# Patient Record
Sex: FEMALE | Race: WHITE | Employment: OTHER | URBAN - METROPOLITAN AREA
[De-identification: names, ages, dates, MRNs, and addresses within clinical notes are randomized per-mention and may not be internally consistent; named-entity substitution may affect disease eponyms.]

---

## 2017-02-17 RX ORDER — TRAZODONE HYDROCHLORIDE 50 MG/1
TABLET ORAL
Qty: 270 TABLET | Refills: 0 | Status: SHIPPED | OUTPATIENT
Start: 2017-02-17 | End: 2017-06-01 | Stop reason: SDUPTHER

## 2017-02-17 RX ORDER — GABAPENTIN 300 MG/1
CAPSULE ORAL
Qty: 270 CAPSULE | Refills: 0 | Status: SHIPPED | OUTPATIENT
Start: 2017-02-17 | End: 2017-06-01 | Stop reason: SDUPTHER

## 2017-02-17 RX ORDER — DULOXETIN HYDROCHLORIDE 60 MG/1
CAPSULE, DELAYED RELEASE ORAL
Qty: 90 CAPSULE | Refills: 0 | Status: SHIPPED | OUTPATIENT
Start: 2017-02-17 | End: 2017-03-21

## 2017-03-02 DIAGNOSIS — E03.9 ACQUIRED HYPOTHYROIDISM: ICD-10-CM

## 2017-03-02 LAB
ANION GAP SERPL CALCULATED.3IONS-SCNC: 13 MEQ/L (ref 7–13)
BUN BLDV-MCNC: 7 MG/DL (ref 6–20)
CALCIUM SERPL-MCNC: 9.5 MG/DL (ref 8.6–10.2)
CHLORIDE BLD-SCNC: 99 MEQ/L (ref 98–107)
CO2: 28 MEQ/L (ref 22–29)
CREAT SERPL-MCNC: 0.67 MG/DL (ref 0.5–0.9)
CREATININE URINE: 178.1 MG/DL
GFR AFRICAN AMERICAN: >60
GFR NON-AFRICAN AMERICAN: >60
GLUCOSE BLD-MCNC: 105 MG/DL (ref 74–109)
HBA1C MFR BLD: 7.4 % (ref 4.8–5.9)
MICROALBUMIN UR-MCNC: 4.3 MG/DL
MICROALBUMIN/CREAT UR-RTO: 24.1 MG/G (ref 0–30)
POTASSIUM SERPL-SCNC: 4.8 MEQ/L (ref 3.5–5.1)
SODIUM BLD-SCNC: 140 MEQ/L (ref 132–144)
T4 FREE: 1.4 NG/DL (ref 0.93–1.7)
TSH SERPL DL<=0.05 MIU/L-ACNC: 5.82 UIU/ML (ref 0.27–4.2)

## 2017-03-04 ENCOUNTER — APPOINTMENT (OUTPATIENT)
Dept: GENERAL RADIOLOGY | Age: 47
End: 2017-03-04
Payer: COMMERCIAL

## 2017-03-04 ENCOUNTER — HOSPITAL ENCOUNTER (EMERGENCY)
Age: 47
Discharge: HOME | End: 2017-03-04
Attending: EMERGENCY MEDICINE
Payer: COMMERCIAL

## 2017-03-04 VITALS
TEMPERATURE: 97.4 F | BODY MASS INDEX: 50.12 KG/M2 | DIASTOLIC BLOOD PRESSURE: 68 MMHG | RESPIRATION RATE: 22 BRPM | HEART RATE: 100 BPM | OXYGEN SATURATION: 98 % | WEIGHT: 293 LBS | SYSTOLIC BLOOD PRESSURE: 120 MMHG

## 2017-03-04 DIAGNOSIS — F41.1 ANXIETY STATE: Primary | ICD-10-CM

## 2017-03-04 LAB
ALBUMIN SERPL-MCNC: 4.1 G/DL (ref 3.9–4.9)
ALP BLD-CCNC: 182 U/L (ref 40–130)
ALT SERPL-CCNC: 56 U/L (ref 0–33)
ANION GAP SERPL CALCULATED.3IONS-SCNC: 19 MEQ/L (ref 7–13)
AST SERPL-CCNC: 58 U/L (ref 0–35)
BILIRUB SERPL-MCNC: 0.3 MG/DL (ref 0–1.2)
BUN BLDV-MCNC: 10 MG/DL (ref 6–20)
CALCIUM SERPL-MCNC: 9.2 MG/DL (ref 8.6–10.2)
CHLORIDE BLD-SCNC: 97 MEQ/L (ref 98–107)
CO2: 22 MEQ/L (ref 22–29)
CREAT SERPL-MCNC: 0.96 MG/DL (ref 0.5–0.9)
GFR AFRICAN AMERICAN: >60
GFR NON-AFRICAN AMERICAN: >60
GLOBULIN: 3.5 G/DL (ref 2.3–3.5)
GLUCOSE BLD-MCNC: 163 MG/DL (ref 74–109)
HCT VFR BLD CALC: 47.9 % (ref 37–47)
HEMOGLOBIN: 16.2 G/DL (ref 12–16)
MCH RBC QN AUTO: 29.1 PG (ref 27–31.3)
MCHC RBC AUTO-ENTMCNC: 33.8 % (ref 33–37)
MCV RBC AUTO: 86.1 FL (ref 82–100)
PDW BLD-RTO: 14.2 % (ref 11.5–14.5)
PLATELET # BLD: 256 K/UL (ref 130–400)
POTASSIUM SERPL-SCNC: 4.6 MEQ/L (ref 3.5–5.1)
RBC # BLD: 5.56 M/UL (ref 4.2–5.4)
SODIUM BLD-SCNC: 138 MEQ/L (ref 132–144)
TOTAL PROTEIN: 7.6 G/DL (ref 6.4–8.1)
WBC # BLD: 4.4 K/UL (ref 4.8–10.8)

## 2017-03-04 PROCEDURE — 36415 COLL VENOUS BLD VENIPUNCTURE: CPT

## 2017-03-04 PROCEDURE — 99284 EMERGENCY DEPT VISIT MOD MDM: CPT

## 2017-03-04 PROCEDURE — 80053 COMPREHEN METABOLIC PANEL: CPT

## 2017-03-04 PROCEDURE — 6360000002 HC RX W HCPCS: Performed by: EMERGENCY MEDICINE

## 2017-03-04 PROCEDURE — 71020 XR CHEST STANDARD TWO VW: CPT

## 2017-03-04 PROCEDURE — 93005 ELECTROCARDIOGRAM TRACING: CPT

## 2017-03-04 PROCEDURE — 96374 THER/PROPH/DIAG INJ IV PUSH: CPT

## 2017-03-04 PROCEDURE — 85027 COMPLETE CBC AUTOMATED: CPT

## 2017-03-04 RX ORDER — LORAZEPAM 2 MG/ML
1 INJECTION INTRAMUSCULAR ONCE
Status: COMPLETED | OUTPATIENT
Start: 2017-03-04 | End: 2017-03-04

## 2017-03-04 RX ADMIN — LORAZEPAM 1 MG: 2 INJECTION INTRAMUSCULAR; INTRAVENOUS at 17:45

## 2017-03-04 ASSESSMENT — ENCOUNTER SYMPTOMS
VOMITING: 0
ABDOMINAL PAIN: 0
WHEEZING: 0
ABDOMINAL DISTENTION: 0
EYE DISCHARGE: 0
COLOR CHANGE: 0
RHINORRHEA: 0
SHORTNESS OF BREATH: 1
FACIAL SWELLING: 0
CHEST TIGHTNESS: 1
PHOTOPHOBIA: 0

## 2017-03-04 ASSESSMENT — PAIN SCALES - GENERAL: PAINLEVEL_OUTOF10: 0

## 2017-03-06 ENCOUNTER — OFFICE VISIT (OUTPATIENT)
Dept: SURGERY | Age: 47
End: 2017-03-06

## 2017-03-06 ENCOUNTER — OFFICE VISIT (OUTPATIENT)
Dept: FAMILY MEDICINE CLINIC | Age: 47
End: 2017-03-06

## 2017-03-06 VITALS
TEMPERATURE: 98.2 F | HEIGHT: 67 IN | RESPIRATION RATE: 20 BRPM | DIASTOLIC BLOOD PRESSURE: 82 MMHG | BODY MASS INDEX: 45.99 KG/M2 | SYSTOLIC BLOOD PRESSURE: 132 MMHG | OXYGEN SATURATION: 98 % | WEIGHT: 293 LBS | HEART RATE: 82 BPM

## 2017-03-06 VITALS — DIASTOLIC BLOOD PRESSURE: 68 MMHG | SYSTOLIC BLOOD PRESSURE: 104 MMHG | HEIGHT: 67 IN | HEART RATE: 108 BPM

## 2017-03-06 DIAGNOSIS — E03.9 ACQUIRED HYPOTHYROIDISM: ICD-10-CM

## 2017-03-06 DIAGNOSIS — F41.9 ANXIETY: ICD-10-CM

## 2017-03-06 DIAGNOSIS — F41.0 PANIC ATTACKS: Primary | ICD-10-CM

## 2017-03-06 LAB — GLUCOSE BLD-MCNC: 180 MG/DL

## 2017-03-06 PROCEDURE — 99213 OFFICE O/P EST LOW 20 MIN: CPT | Performed by: FAMILY MEDICINE

## 2017-03-06 PROCEDURE — 99213 OFFICE O/P EST LOW 20 MIN: CPT | Performed by: INTERNAL MEDICINE

## 2017-03-06 PROCEDURE — 82962 GLUCOSE BLOOD TEST: CPT | Performed by: INTERNAL MEDICINE

## 2017-03-06 RX ORDER — ESCITALOPRAM OXALATE 20 MG/1
20 TABLET ORAL DAILY
Qty: 30 TABLET | Refills: 2 | Status: SHIPPED | OUTPATIENT
Start: 2017-03-06 | End: 2017-05-23 | Stop reason: SDUPTHER

## 2017-03-06 RX ORDER — LORAZEPAM 0.5 MG/1
0.5 TABLET ORAL EVERY 8 HOURS PRN
Qty: 60 TABLET | Refills: 1 | Status: SHIPPED | OUTPATIENT
Start: 2017-03-06 | End: 2017-03-21

## 2017-03-08 ENCOUNTER — TELEPHONE (OUTPATIENT)
Dept: FAMILY MEDICINE CLINIC | Age: 47
End: 2017-03-08

## 2017-03-09 ENCOUNTER — OFFICE VISIT (OUTPATIENT)
Dept: FAMILY MEDICINE CLINIC | Age: 47
End: 2017-03-09

## 2017-03-09 VITALS
TEMPERATURE: 98.1 F | WEIGHT: 293 LBS | DIASTOLIC BLOOD PRESSURE: 88 MMHG | RESPIRATION RATE: 18 BRPM | BODY MASS INDEX: 45.99 KG/M2 | HEART RATE: 78 BPM | OXYGEN SATURATION: 96 % | HEIGHT: 67 IN | SYSTOLIC BLOOD PRESSURE: 138 MMHG

## 2017-03-09 DIAGNOSIS — F41.0 PANIC ATTACKS: Primary | ICD-10-CM

## 2017-03-09 DIAGNOSIS — F41.9 ANXIETY: ICD-10-CM

## 2017-03-09 LAB
EKG ATRIAL RATE: 107 BPM
EKG P AXIS: 48 DEGREES
EKG P-R INTERVAL: 128 MS
EKG Q-T INTERVAL: 318 MS
EKG QRS DURATION: 78 MS
EKG QTC CALCULATION (BAZETT): 424 MS
EKG R AXIS: 146 DEGREES
EKG T AXIS: -44 DEGREES
EKG VENTRICULAR RATE: 107 BPM

## 2017-03-09 PROCEDURE — 99213 OFFICE O/P EST LOW 20 MIN: CPT | Performed by: FAMILY MEDICINE

## 2017-03-09 RX ORDER — ALPRAZOLAM 0.5 MG/1
0.5 TABLET ORAL 3 TIMES DAILY PRN
Qty: 90 TABLET | Refills: 1 | Status: SHIPPED | OUTPATIENT
Start: 2017-03-09 | End: 2017-11-21 | Stop reason: SDUPTHER

## 2017-03-09 RX ORDER — PANTOPRAZOLE SODIUM 40 MG/1
40 TABLET, DELAYED RELEASE ORAL DAILY
Qty: 30 TABLET | Refills: 3 | Status: SHIPPED | OUTPATIENT
Start: 2017-03-09 | End: 2017-05-23 | Stop reason: SDUPTHER

## 2017-03-09 RX ORDER — QUETIAPINE FUMARATE 50 MG/1
50 TABLET, EXTENDED RELEASE ORAL NIGHTLY
Qty: 30 TABLET | Refills: 1 | Status: SHIPPED | OUTPATIENT
Start: 2017-03-09 | End: 2017-05-23 | Stop reason: CLARIF

## 2017-03-12 ASSESSMENT — ENCOUNTER SYMPTOMS
RESPIRATORY NEGATIVE: 1
GASTROINTESTINAL NEGATIVE: 1
EYES NEGATIVE: 1
CHEST TIGHTNESS: 0
RHINORRHEA: 0
GASTROINTESTINAL NEGATIVE: 1
COUGH: 0
CHEST TIGHTNESS: 0
COUGH: 0
RESPIRATORY NEGATIVE: 1
EYES NEGATIVE: 1
RHINORRHEA: 0

## 2017-03-14 ENCOUNTER — OFFICE VISIT (OUTPATIENT)
Dept: FAMILY MEDICINE CLINIC | Age: 47
End: 2017-03-14

## 2017-03-14 VITALS
HEART RATE: 82 BPM | RESPIRATION RATE: 18 BRPM | SYSTOLIC BLOOD PRESSURE: 134 MMHG | TEMPERATURE: 98.1 F | OXYGEN SATURATION: 98 % | WEIGHT: 293 LBS | BODY MASS INDEX: 44.41 KG/M2 | DIASTOLIC BLOOD PRESSURE: 78 MMHG | HEIGHT: 68 IN

## 2017-03-14 DIAGNOSIS — F32.A DEPRESSION, UNSPECIFIED DEPRESSION TYPE: ICD-10-CM

## 2017-03-14 DIAGNOSIS — F41.0 PANIC ATTACKS: Primary | ICD-10-CM

## 2017-03-14 PROCEDURE — 99213 OFFICE O/P EST LOW 20 MIN: CPT | Performed by: FAMILY MEDICINE

## 2017-03-14 ASSESSMENT — ENCOUNTER SYMPTOMS
GASTROINTESTINAL NEGATIVE: 1
CHEST TIGHTNESS: 0
RHINORRHEA: 0
EYES NEGATIVE: 1
RESPIRATORY NEGATIVE: 1
COUGH: 0

## 2017-03-21 ENCOUNTER — OFFICE VISIT (OUTPATIENT)
Dept: FAMILY MEDICINE CLINIC | Age: 47
End: 2017-03-21

## 2017-03-21 VITALS
WEIGHT: 293 LBS | DIASTOLIC BLOOD PRESSURE: 82 MMHG | OXYGEN SATURATION: 98 % | SYSTOLIC BLOOD PRESSURE: 130 MMHG | RESPIRATION RATE: 20 BRPM | TEMPERATURE: 98.6 F | HEIGHT: 68 IN | HEART RATE: 100 BPM | BODY MASS INDEX: 44.41 KG/M2

## 2017-03-21 DIAGNOSIS — F41.9 ANXIETY: ICD-10-CM

## 2017-03-21 DIAGNOSIS — M54.50 ACUTE BILATERAL LOW BACK PAIN WITHOUT SCIATICA: ICD-10-CM

## 2017-03-21 DIAGNOSIS — F41.0 PANIC ATTACKS: Primary | ICD-10-CM

## 2017-03-21 PROCEDURE — 99213 OFFICE O/P EST LOW 20 MIN: CPT | Performed by: FAMILY MEDICINE

## 2017-03-21 RX ORDER — QUETIAPINE FUMARATE 50 MG/1
50 TABLET, FILM COATED ORAL 2 TIMES DAILY
Qty: 60 TABLET | Refills: 3 | Status: SHIPPED | OUTPATIENT
Start: 2017-03-21 | End: 2017-05-23 | Stop reason: SDUPTHER

## 2017-03-21 RX ORDER — ETODOLAC 400 MG/1
400 TABLET, FILM COATED ORAL 2 TIMES DAILY
Qty: 60 TABLET | Refills: 3 | Status: SHIPPED | OUTPATIENT
Start: 2017-03-21 | End: 2017-05-23 | Stop reason: SDUPTHER

## 2017-03-21 ASSESSMENT — ENCOUNTER SYMPTOMS
RESPIRATORY NEGATIVE: 1
CHEST TIGHTNESS: 0
GASTROINTESTINAL NEGATIVE: 1
RHINORRHEA: 0
COUGH: 0
EYES NEGATIVE: 1

## 2017-05-23 ENCOUNTER — OFFICE VISIT (OUTPATIENT)
Dept: FAMILY MEDICINE CLINIC | Age: 47
End: 2017-05-23

## 2017-05-23 VITALS
TEMPERATURE: 98.2 F | HEART RATE: 64 BPM | HEIGHT: 68 IN | RESPIRATION RATE: 18 BRPM | SYSTOLIC BLOOD PRESSURE: 132 MMHG | OXYGEN SATURATION: 97 % | DIASTOLIC BLOOD PRESSURE: 84 MMHG | BODY MASS INDEX: 44.41 KG/M2 | WEIGHT: 293 LBS

## 2017-05-23 DIAGNOSIS — F32.A DEPRESSION, UNSPECIFIED DEPRESSION TYPE: ICD-10-CM

## 2017-05-23 DIAGNOSIS — F41.0 PANIC ATTACKS: Primary | ICD-10-CM

## 2017-05-23 PROCEDURE — 99213 OFFICE O/P EST LOW 20 MIN: CPT | Performed by: FAMILY MEDICINE

## 2017-05-23 RX ORDER — ESCITALOPRAM OXALATE 20 MG/1
20 TABLET ORAL DAILY
Qty: 90 TABLET | Refills: 3 | Status: SHIPPED | OUTPATIENT
Start: 2017-05-23 | End: 2018-11-26 | Stop reason: ALTCHOICE

## 2017-05-23 RX ORDER — QUETIAPINE FUMARATE 50 MG/1
50 TABLET, FILM COATED ORAL 2 TIMES DAILY
Qty: 180 TABLET | Refills: 3 | Status: SHIPPED | OUTPATIENT
Start: 2017-05-23 | End: 2018-05-22 | Stop reason: SDUPTHER

## 2017-05-23 RX ORDER — ETODOLAC 400 MG/1
400 TABLET, FILM COATED ORAL 2 TIMES DAILY
Qty: 180 TABLET | Refills: 3 | Status: SHIPPED | OUTPATIENT
Start: 2017-05-23 | End: 2018-03-30 | Stop reason: ALTCHOICE

## 2017-05-23 RX ORDER — PANTOPRAZOLE SODIUM 40 MG/1
40 TABLET, DELAYED RELEASE ORAL DAILY
Qty: 90 TABLET | Refills: 3 | Status: SHIPPED | OUTPATIENT
Start: 2017-05-23 | End: 2018-06-18 | Stop reason: SDUPTHER

## 2017-05-23 ASSESSMENT — ENCOUNTER SYMPTOMS
RESPIRATORY NEGATIVE: 1
COUGH: 0
RHINORRHEA: 0
CHEST TIGHTNESS: 0
EYES NEGATIVE: 1
GASTROINTESTINAL NEGATIVE: 1

## 2017-06-02 RX ORDER — TRAZODONE HYDROCHLORIDE 50 MG/1
TABLET ORAL
Qty: 270 TABLET | Refills: 0 | Status: SHIPPED | OUTPATIENT
Start: 2017-06-02 | End: 2017-09-02 | Stop reason: SDUPTHER

## 2017-06-02 RX ORDER — GABAPENTIN 300 MG/1
CAPSULE ORAL
Qty: 270 CAPSULE | Refills: 0 | Status: SHIPPED | OUTPATIENT
Start: 2017-06-02 | End: 2017-07-24 | Stop reason: ALTCHOICE

## 2017-06-07 ENCOUNTER — TELEPHONE (OUTPATIENT)
Dept: FAMILY MEDICINE CLINIC | Age: 47
End: 2017-06-07

## 2017-06-07 DIAGNOSIS — M54.16 LUMBAR RADICULAR PAIN: Primary | ICD-10-CM

## 2017-06-08 ENCOUNTER — TELEPHONE (OUTPATIENT)
Dept: FAMILY MEDICINE CLINIC | Age: 47
End: 2017-06-08

## 2017-06-12 DIAGNOSIS — M54.16 LUMBAR RADICULAR PAIN: Primary | ICD-10-CM

## 2017-07-21 DIAGNOSIS — E03.9 ACQUIRED HYPOTHYROIDISM: ICD-10-CM

## 2017-07-21 LAB
ANION GAP SERPL CALCULATED.3IONS-SCNC: 12 MEQ/L (ref 7–13)
BUN BLDV-MCNC: 12 MG/DL (ref 6–20)
CALCIUM SERPL-MCNC: 8.7 MG/DL (ref 8.6–10.2)
CHLORIDE BLD-SCNC: 101 MEQ/L (ref 98–107)
CO2: 25 MEQ/L (ref 22–29)
CREAT SERPL-MCNC: 0.85 MG/DL (ref 0.5–0.9)
GFR AFRICAN AMERICAN: >60
GFR NON-AFRICAN AMERICAN: >60
GLUCOSE BLD-MCNC: 146 MG/DL (ref 74–109)
HBA1C MFR BLD: 6.7 % (ref 4.8–5.9)
POTASSIUM SERPL-SCNC: 4.3 MEQ/L (ref 3.5–5.1)
SODIUM BLD-SCNC: 138 MEQ/L (ref 132–144)
T4 FREE: 1.3 NG/DL (ref 0.93–1.7)
TSH SERPL DL<=0.05 MIU/L-ACNC: 4.99 UIU/ML (ref 0.27–4.2)

## 2017-07-24 ENCOUNTER — OFFICE VISIT (OUTPATIENT)
Dept: SURGERY | Age: 47
End: 2017-07-24

## 2017-07-24 VITALS
BODY MASS INDEX: 45.99 KG/M2 | SYSTOLIC BLOOD PRESSURE: 131 MMHG | HEIGHT: 67 IN | HEART RATE: 97 BPM | WEIGHT: 293 LBS | DIASTOLIC BLOOD PRESSURE: 82 MMHG

## 2017-07-24 DIAGNOSIS — E03.9 ACQUIRED HYPOTHYROIDISM: ICD-10-CM

## 2017-07-24 DIAGNOSIS — E10.42 DIABETIC POLYNEUROPATHY ASSOCIATED WITH TYPE 1 DIABETES MELLITUS (HCC): ICD-10-CM

## 2017-07-24 PROCEDURE — 99214 OFFICE O/P EST MOD 30 MIN: CPT | Performed by: INTERNAL MEDICINE

## 2017-07-24 RX ORDER — PREGABALIN 150 MG/1
150 CAPSULE ORAL 2 TIMES DAILY
Qty: 60 CAPSULE | Refills: 3 | Status: SHIPPED | OUTPATIENT
Start: 2017-07-24 | End: 2018-02-26 | Stop reason: SDUPTHER

## 2017-09-05 RX ORDER — TRAZODONE HYDROCHLORIDE 50 MG/1
TABLET ORAL
Qty: 270 TABLET | Refills: 0 | Status: SHIPPED | OUTPATIENT
Start: 2017-09-05 | End: 2017-12-12 | Stop reason: SDUPTHER

## 2017-09-05 RX ORDER — LISINOPRIL 10 MG/1
TABLET ORAL
Qty: 90 TABLET | Refills: 2 | Status: SHIPPED | OUTPATIENT
Start: 2017-09-05 | End: 2018-02-26 | Stop reason: SDUPTHER

## 2017-10-19 DIAGNOSIS — E03.9 ACQUIRED HYPOTHYROIDISM: ICD-10-CM

## 2017-10-19 LAB
ANION GAP SERPL CALCULATED.3IONS-SCNC: 13 MEQ/L (ref 7–13)
BUN BLDV-MCNC: 9 MG/DL (ref 6–20)
CALCIUM SERPL-MCNC: 9 MG/DL (ref 8.6–10.2)
CHLORIDE BLD-SCNC: 98 MEQ/L (ref 98–107)
CO2: 27 MEQ/L (ref 22–29)
CREAT SERPL-MCNC: 0.87 MG/DL (ref 0.5–0.9)
CREATININE URINE: 238.8 MG/DL
GFR AFRICAN AMERICAN: >60
GFR NON-AFRICAN AMERICAN: >60
GLUCOSE BLD-MCNC: 178 MG/DL (ref 74–109)
HBA1C MFR BLD: 7.4 % (ref 4.8–5.9)
MICROALBUMIN UR-MCNC: 2.3 MG/DL
MICROALBUMIN/CREAT UR-RTO: 9.6 MG/G (ref 0–30)
POTASSIUM SERPL-SCNC: 4.6 MEQ/L (ref 3.5–5.1)
SODIUM BLD-SCNC: 138 MEQ/L (ref 132–144)
T4 FREE: 1.63 NG/DL (ref 0.93–1.7)
TSH SERPL DL<=0.05 MIU/L-ACNC: 2.68 UIU/ML (ref 0.27–4.2)

## 2017-10-23 ENCOUNTER — TELEPHONE (OUTPATIENT)
Dept: FAMILY MEDICINE CLINIC | Age: 47
End: 2017-10-23

## 2017-10-24 ENCOUNTER — OFFICE VISIT (OUTPATIENT)
Dept: SURGERY | Age: 47
End: 2017-10-24

## 2017-10-24 VITALS
HEART RATE: 88 BPM | SYSTOLIC BLOOD PRESSURE: 129 MMHG | WEIGHT: 293 LBS | HEIGHT: 67 IN | BODY MASS INDEX: 45.99 KG/M2 | DIASTOLIC BLOOD PRESSURE: 84 MMHG

## 2017-10-24 DIAGNOSIS — J40 BRONCHITIS: ICD-10-CM

## 2017-10-24 DIAGNOSIS — E03.8 HYPOTHYROIDISM DUE TO HASHIMOTO'S THYROIDITIS: ICD-10-CM

## 2017-10-24 DIAGNOSIS — E06.3 HYPOTHYROIDISM DUE TO HASHIMOTO'S THYROIDITIS: ICD-10-CM

## 2017-10-24 PROCEDURE — 99214 OFFICE O/P EST MOD 30 MIN: CPT | Performed by: INTERNAL MEDICINE

## 2017-10-24 RX ORDER — TRAMADOL HYDROCHLORIDE 50 MG/1
50 TABLET ORAL EVERY 8 HOURS PRN
COMMUNITY
End: 2018-03-30 | Stop reason: ALTCHOICE

## 2017-10-24 RX ORDER — AZITHROMYCIN 250 MG/1
TABLET, FILM COATED ORAL
Qty: 1 PACKET | Refills: 0 | Status: SHIPPED | OUTPATIENT
Start: 2017-10-24 | End: 2018-03-30 | Stop reason: SDUPTHER

## 2017-10-24 ASSESSMENT — ENCOUNTER SYMPTOMS
BACK PAIN: 1
COUGH: 1

## 2017-10-24 NOTE — PROGRESS NOTES
Subjective:      Patient ID: Kyung Almodovar is a 55 y.o. female. Diabetes   She presents for her follow-up diabetic visit. She has type 1 diabetes mellitus. Her disease course has been worsening. Hypoglycemia symptoms include confusion, mood changes, nervousness/anxiousness and sweats. Associated symptoms include fatigue and foot paresthesias. Hypoglycemia complications include required assistance. Symptoms are worsening. Diabetic complications include peripheral neuropathy and retinopathy. Risk factors for coronary artery disease include diabetes mellitus and obesity. Current diabetic treatment includes insulin pump and oral agent (monotherapy). She is compliant with treatment all of the time. She is currently taking insulin pre-breakfast, pre-lunch, pre-dinner and at bedtime. Insulin injections are given by patient. Her weight is fluctuating minimally. She is following a diabetic diet. Meal planning includes carbohydrate counting. She participates in exercise intermittently. She monitors blood glucose at home 3-4 x per day. Blood glucose monitoring compliance is good. Her home blood glucose trend is fluctuating minimally. Her breakfast blood glucose range is generally  mg/dl. Her lunch blood glucose range is generally  mg/dl. Her highest blood glucose is >200 mg/dl. Her overall blood glucose range is 130-140 mg/dl. (Lab Results       Component                Value               Date                       LABA1C                   7.4 (H)             10/19/2017              Pump downloaded                           ) An ACE inhibitor/angiotensin II receptor blocker is being taken. Eye exam is current. Other   Chronicity: Bronchitis. The current episode started 1 to 4 weeks ago. Associated symptoms include congestion, coughing and fatigue. Exacerbated by: Seasonal allergies. She has tried nothing for the symptoms.        Hypothyroidism  On replacement with synthroid 200 mcg daily   Labs reviewed stable Patient seeing pain specialist for her back pain and also spinal stenosis    Patient Active Problem List   Diagnosis    Pilonidal cyst    Panic attacks    Obesity    Background retinopathy due to secondary diabetes ([facility] [address]    Autoimmune lymphocytic chronic thyroiditis    Age related cataract    Acquired hypothyroidism    Avitaminosis D     No Known Allergies        Current Outpatient Prescriptions:     traMADol (ULTRAM) 50 MG tablet, Take 50 mg by mouth every 8 hours as needed for Pain, Disp: , Rfl:     lisinopril (PRINIVIL;ZESTRIL) 10 MG tablet, TAKE 1 TABLET DAILY, Disp: 90 tablet, Rfl: 2    HUMALOG 100 UNIT/ML injection vial, INJECT PER INSULIN PUMP MAX DOSE 100 UNITS  PER DAY, Disp: 90 mL, Rfl: 2    traZODone (DESYREL) 50 MG tablet, TAKE 3 TABLETS AT BEDTIME, Disp: 270 tablet, Rfl: 0    pregabalin (LYRICA) 150 MG capsule, Take 1 capsule by mouth 2 times daily, Disp: 60 capsule, Rfl: 3    QUEtiapine (SEROQUEL) 50 MG tablet, Take 1 tablet by mouth 2 times daily, Disp: 180 tablet, Rfl: 3    etodolac (LODINE) 400 MG tablet, Take 1 tablet by mouth 2 times daily, Disp: 180 tablet, Rfl: 3    pantoprazole (PROTONIX) 40 MG tablet, Take 1 tablet by mouth daily, Disp: 90 tablet, Rfl: 3    escitalopram (LEXAPRO) 20 MG tablet, Take 1 tablet by mouth daily, Disp: 90 tablet, Rfl: 3    Fish Oil-Cholecalciferol (FISH OIL + D3 PO), Take by mouth, Disp: , Rfl:     metFORMIN (GLUCOPHAGE) 500 MG tablet, TAKE 1 TABLET TWICE A DAY WITH MEALS, Disp: 180 tablet, Rfl: 2    levothyroxine (SYNTHROID) 200 MCG tablet, TAKE 1 TABLET DAILY, Disp: 90 tablet, Rfl: 2    vitamin D (CHOLECALCIFEROL) 1000 UNIT TABS tablet, Take 1,000 Units by mouth daily, Disp: , Rfl:     Pyridoxine HCl (VITAMIN B-6) 100 MG tablet, Take 100 mg by mouth daily, Disp: , Rfl:     insulin glargine (LANTUS) 100 UNIT/ML injection vial, 40 units at bedtime (Patient taking differently: Indications: Pt to use as backup if insulin pump fails 40 units at bedtime), Disp: 40 mL, Rfl: 3    Insulin Syringe-Needle U-100 ([employer] INSULIN SYRINGE) 31G X 5/16\" 0.5 ML MISC, 1 each by Does not apply route daily, Disp: 300 each, Rfl: 3    INSULIN INFUSION PUMP, by Does not apply route.  , Disp: , Rfl:     ALPRAZolam (XANAX) 0.5 MG tablet, Take 1 tablet by mouth 3 times daily as needed for Sleep or Anxiety, Disp: 90 tablet, Rfl: 1      Review of Systems   Constitutional: Positive for fatigue. HENT: Positive for congestion. Respiratory: Positive for cough. Musculoskeletal: Positive for back pain. Psychiatric/Behavioral: Positive for confusion. The patient is nervous/anxious. All other systems reviewed and are negative. Vitals:    10/24/17 0914   BP: 129/84   Site: Left Arm   Position: Sitting   Cuff Size: Large Adult   Pulse: 88   Weight: (!) 319 lb (144.7 kg)   Height: 5' 7\" (1.702 m)       Objective:   Physical Exam   Constitutional: She is oriented to person, place, and time. She appears well-developed and well-nourished. HENT:   Head: Normocephalic and atraumatic. Right Ear: External ear normal.   Left Ear: External ear normal.   Eyes: Conjunctivae are normal.   Neck: Neck supple. Cardiovascular: Normal rate and normal heart sounds. Pulmonary/Chest: Effort normal. She has no wheezes. Abdominal:   obese   Musculoskeletal: Normal range of motion. She exhibits edema. Feet:    Neurological: She is alert and oriented to person, place, and time. Skin: Skin is warm. Psychiatric: Thought content normal. Her mood appears anxious. Results for Maurizio Naylor (MRN [id number]) as of 10/24/2017 09:31   Ref.  Range 10/19/2017 08:16   Sodium Latest Ref Range: 132 - 144 mEq/L 138   Potassium Latest Ref Range: 3.5 - 5.1 mEq/L 4.6   Chloride Latest Ref Range: 98 - 107 mEq/L 98   CO2 Latest Ref Range: 22 - 29 mEq/L 27   BUN Latest Ref Range: 6 - 20 mg/dL 9   Creatinine Latest Ref Range: 0.50 - 0.90 mg/dL 0.87   Anion Gap Latest Ref Range: 7 - 13 mEq/L blood sugar levels. Managing high and low sugar readings.

## 2017-11-21 ENCOUNTER — OFFICE VISIT (OUTPATIENT)
Dept: FAMILY MEDICINE CLINIC | Age: 47
End: 2017-11-21

## 2017-11-21 VITALS
TEMPERATURE: 97.8 F | RESPIRATION RATE: 18 BRPM | HEART RATE: 74 BPM | OXYGEN SATURATION: 97 % | SYSTOLIC BLOOD PRESSURE: 132 MMHG | DIASTOLIC BLOOD PRESSURE: 82 MMHG | HEIGHT: 68 IN

## 2017-11-21 DIAGNOSIS — F41.0 PANIC ATTACKS: Primary | ICD-10-CM

## 2017-11-21 PROCEDURE — 99213 OFFICE O/P EST LOW 20 MIN: CPT | Performed by: FAMILY MEDICINE

## 2017-11-21 RX ORDER — ALPRAZOLAM 0.5 MG/1
0.5 TABLET ORAL 3 TIMES DAILY PRN
Qty: 90 TABLET | Refills: 1 | Status: SHIPPED | OUTPATIENT
Start: 2017-11-21 | End: 2018-02-21 | Stop reason: SDUPTHER

## 2017-11-21 ASSESSMENT — ENCOUNTER SYMPTOMS
CHEST TIGHTNESS: 0
GASTROINTESTINAL NEGATIVE: 1
EYES NEGATIVE: 1
RHINORRHEA: 0
RESPIRATORY NEGATIVE: 1
COUGH: 0

## 2017-11-21 NOTE — PROGRESS NOTES
and chest tightness. Cardiovascular: Negative. Gastrointestinal: Negative. Endocrine: Negative. Genitourinary: Negative. Musculoskeletal: Negative. Skin: Negative. Neurological: Negative for dizziness, light-headedness and numbness. Hematological: Negative. Psychiatric/Behavioral: The patient is nervous/anxious. Physical Exam  Vitals:    11/21/17 0842   BP: 132/82   Pulse: 74   Resp: 18   Temp: 97.8 °F (36.6 °C)   TempSrc: Tympanic   SpO2: 97%   Height: 5' 8\" (1.727 m)       Physical Exam   Constitutional: She appears well-developed and well-nourished. HENT:   Right Ear: External ear normal.   Left Ear: External ear normal.   Eyes: Conjunctivae are normal. Pupils are equal, round, and reactive to light. Neck: Normal range of motion. Neck supple. No thyromegaly present. Cardiovascular: Normal rate, regular rhythm and normal heart sounds. No murmur heard. Pulmonary/Chest: Effort normal and breath sounds normal.   Abdominal: Soft. Bowel sounds are normal. She exhibits no distension. There is no tenderness. Musculoskeletal: Normal range of motion. She exhibits no edema or tenderness. Lymphadenopathy:     She has no cervical adenopathy. Neurological: She is alert. No cranial nerve deficit. Coordination normal.       Assessment  1. Panic attacks       Problem List     Panic attacks - Primary    Relevant Medications    LORazepam (ATIVAN) injection 1 mg (Completed)    escitalopram (LEXAPRO) 20 MG tablet    traZODone (DESYREL) 50 MG tablet      Controlled Substances Monitoring:     Attestation: The Prescription Monitoring Report for this patient was reviewed today. Governor Khris MD)  Documentation: Possible medication side effects, risk of tolerance and/or dependence, and alternative treatments discussed., No signs of potential drug abuse or diversion identified., Medication contract signed today.  Governor Khris MD)      Plan  No orders of the defined types were placed in this encounter. No orders of the defined types were placed in this encounter. Follow up in three months.   Ana Hatfield MD

## 2017-12-12 RX ORDER — LEVOTHYROXINE SODIUM 0.2 MG/1
TABLET ORAL
Qty: 90 TABLET | Refills: 2 | Status: SHIPPED | OUTPATIENT
Start: 2017-12-12 | End: 2018-09-29 | Stop reason: SDUPTHER

## 2017-12-13 RX ORDER — TRAZODONE HYDROCHLORIDE 50 MG/1
TABLET ORAL
Qty: 270 TABLET | Refills: 0 | Status: SHIPPED | OUTPATIENT
Start: 2017-12-13 | End: 2018-03-17 | Stop reason: SDUPTHER

## 2017-12-27 RX ORDER — PREGABALIN 150 MG/1
150 CAPSULE ORAL 2 TIMES DAILY
Qty: 60 CAPSULE | Refills: 3 | OUTPATIENT
Start: 2017-12-27

## 2018-02-21 ENCOUNTER — OFFICE VISIT (OUTPATIENT)
Dept: FAMILY MEDICINE CLINIC | Age: 48
End: 2018-02-21
Payer: COMMERCIAL

## 2018-02-21 VITALS
SYSTOLIC BLOOD PRESSURE: 124 MMHG | RESPIRATION RATE: 18 BRPM | WEIGHT: 293 LBS | DIASTOLIC BLOOD PRESSURE: 80 MMHG | BODY MASS INDEX: 44.41 KG/M2 | TEMPERATURE: 97.6 F | OXYGEN SATURATION: 97 % | HEIGHT: 68 IN | HEART RATE: 70 BPM

## 2018-02-21 DIAGNOSIS — F41.0 PANIC ATTACKS: Primary | ICD-10-CM

## 2018-02-21 PROCEDURE — 99213 OFFICE O/P EST LOW 20 MIN: CPT | Performed by: FAMILY MEDICINE

## 2018-02-21 RX ORDER — ALPRAZOLAM 0.5 MG/1
0.5 TABLET ORAL 3 TIMES DAILY PRN
Qty: 90 TABLET | Refills: 1 | Status: SHIPPED | OUTPATIENT
Start: 2018-02-21 | End: 2018-08-21 | Stop reason: SDUPTHER

## 2018-02-21 ASSESSMENT — PATIENT HEALTH QUESTIONNAIRE - PHQ9
1. LITTLE INTEREST OR PLEASURE IN DOING THINGS: 0
SUM OF ALL RESPONSES TO PHQ QUESTIONS 1-9: 0
2. FEELING DOWN, DEPRESSED OR HOPELESS: 0
SUM OF ALL RESPONSES TO PHQ9 QUESTIONS 1 & 2: 0

## 2018-02-21 ASSESSMENT — ENCOUNTER SYMPTOMS
BACK PAIN: 1
CHEST TIGHTNESS: 0
RHINORRHEA: 0
RESPIRATORY NEGATIVE: 1
COUGH: 0
GASTROINTESTINAL NEGATIVE: 1
EYES NEGATIVE: 1

## 2018-02-21 NOTE — PROGRESS NOTES
Patient is seen in follow up for   Chief Complaint   Patient presents with    Medication Check     3 mon CSM      HPIHere for follow up on anxiety and panic attacks controlled on up to three xanax per day. Past Medical History:   Diagnosis Date    Arthritis     right knee and ankle    Pilonidal cyst     Thyroid disease     hypothyroid    Type 1 diabetes ([facility] [address]      Patient Active Problem List    Diagnosis Date Noted    Background retinopathy due to secondary diabetes ([facility] [address] 10/16/2014    Age related cataract 10/16/2014    Obesity 08/18/2013    Panic attacks 11/29/2012    Pilonidal cyst     Avitaminosis D 03/28/2011    Autoimmune lymphocytic chronic thyroiditis 11/05/2002    Acquired hypothyroidism 11/05/2002     Past Surgical History:   Procedure Laterality Date    APPENDECTOMY      ARTHROSCOPY / ARTHROTOMY KNEE Right 11/8/2016    RIGHT KNEE ARTHROSCOPY / MEDIAL MENISCUS TEAR / SUPINE  performed by Maryam Moore MD at [address] [place] [address]     Family History   Problem Relation Age of Onset    Arthritis Mother     Heart Disease Father     High Blood Pressure Father     No Known Problems Daughter      Social History     Social History    Marital status:      Spouse name: N/A    Number of children: N/A    Years of education: N/A     Social History Main Topics    Smoking status: Never Smoker    Smokeless tobacco: Never Used    Alcohol use No    Drug use: No    Sexual activity: Yes     Other Topics Concern    None     Social History Narrative    None     Current Outpatient Prescriptions   Medication Sig Dispense Refill    ALPRAZolam (XANAX) 0.5 MG tablet Take 1 tablet by mouth 3 times daily as needed for Sleep or Anxiety for up to 30 days.  90 tablet 1    traZODone (DESYREL) 50 MG tablet TAKE 3 TABLETS AT BEDTIME 270 tablet 0    levothyroxine (SYNTHROID) 200 MCG tablet TAKE 1 TABLET DAILY 90 tablet 2    metFORMIN (GLUCOPHAGE) 500 MG tablet TAKE 1 TABLET TWICE A Negative. Respiratory: Negative. Negative for cough and chest tightness. Cardiovascular: Negative. Gastrointestinal: Negative. Endocrine: Negative. Genitourinary: Negative. Musculoskeletal: Positive for back pain. Skin: Negative. Neurological: Negative for dizziness, light-headedness and numbness. Hematological: Negative. Psychiatric/Behavioral: The patient is nervous/anxious. Physical Exam  Vitals:    02/21/18 1010   BP: 124/80   Pulse: 70   Resp: 18   Temp: 97.6 °F (36.4 °C)   TempSrc: Tympanic   SpO2: 97%   Weight: (!) 331 lb (150.1 kg)   Height: 5' 8\" (1.727 m)       Physical Exam   Constitutional: She appears well-developed and well-nourished. HENT:   Right Ear: External ear normal.   Left Ear: External ear normal.   Eyes: Conjunctivae are normal. Pupils are equal, round, and reactive to light. Neck: Normal range of motion. Neck supple. No thyromegaly present. Cardiovascular: Normal rate, regular rhythm and normal heart sounds. No murmur heard. Pulmonary/Chest: Effort normal and breath sounds normal.   Abdominal: Soft. Bowel sounds are normal. She exhibits no distension. There is no tenderness. Musculoskeletal: Normal range of motion. She exhibits no edema or tenderness. Lymphadenopathy:     She has no cervical adenopathy. Neurological: She is alert. No cranial nerve deficit. Coordination normal.       Assessment  1. Panic attacks       Problem List     Panic attacks - Primary    Relevant Medications    LORazepam (ATIVAN) injection 1 mg (Completed)    escitalopram (LEXAPRO) 20 MG tablet    traZODone (DESYREL) 50 MG tablet    ALPRAZolam (XANAX) 0.5 MG tablet      Controlled Substances Monitoring: The Prescription Monitoring Report for this patient was reviewed today. Rudolph Corea MD)    Possible medication side effects, risk of tolerance/dependence & alternative treatments discussed., No signs of potential drug abuse or diversion identified.  Schuyler Josue

## 2018-02-22 DIAGNOSIS — E03.8 HYPOTHYROIDISM DUE TO HASHIMOTO'S THYROIDITIS: ICD-10-CM

## 2018-02-22 DIAGNOSIS — E06.3 HYPOTHYROIDISM DUE TO HASHIMOTO'S THYROIDITIS: ICD-10-CM

## 2018-02-22 LAB
ANION GAP SERPL CALCULATED.3IONS-SCNC: 13 MEQ/L (ref 7–13)
BUN BLDV-MCNC: 8 MG/DL (ref 6–20)
CALCIUM SERPL-MCNC: 8.8 MG/DL (ref 8.6–10.2)
CHLORIDE BLD-SCNC: 100 MEQ/L (ref 98–107)
CO2: 28 MEQ/L (ref 22–29)
CREAT SERPL-MCNC: 0.91 MG/DL (ref 0.5–0.9)
CREATININE URINE: 114.8 MG/DL
GFR AFRICAN AMERICAN: >60
GFR NON-AFRICAN AMERICAN: >60
GLUCOSE BLD-MCNC: 133 MG/DL (ref 74–109)
HBA1C MFR BLD: 7.5 % (ref 4.8–5.9)
MICROALBUMIN UR-MCNC: 8 MG/DL
MICROALBUMIN/CREAT UR-RTO: 69.7 MG/G (ref 0–30)
POTASSIUM SERPL-SCNC: 5 MEQ/L (ref 3.5–5.1)
SODIUM BLD-SCNC: 141 MEQ/L (ref 132–144)
T4 FREE: 1.27 NG/DL (ref 0.93–1.7)
TSH SERPL DL<=0.05 MIU/L-ACNC: 8.54 UIU/ML (ref 0.27–4.2)

## 2018-02-26 ENCOUNTER — TELEPHONE (OUTPATIENT)
Dept: ENDOCRINOLOGY | Age: 48
End: 2018-02-26

## 2018-02-26 ENCOUNTER — OFFICE VISIT (OUTPATIENT)
Dept: ENDOCRINOLOGY | Age: 48
End: 2018-02-26
Payer: COMMERCIAL

## 2018-02-26 VITALS
TEMPERATURE: 98.2 F | SYSTOLIC BLOOD PRESSURE: 130 MMHG | BODY MASS INDEX: 44.41 KG/M2 | HEART RATE: 78 BPM | DIASTOLIC BLOOD PRESSURE: 74 MMHG | HEIGHT: 68 IN | WEIGHT: 293 LBS | OXYGEN SATURATION: 96 %

## 2018-02-26 DIAGNOSIS — E10.21 DIABETIC NEPHROPATHY ASSOCIATED WITH TYPE 1 DIABETES MELLITUS (HCC): ICD-10-CM

## 2018-02-26 DIAGNOSIS — E03.9 HYPOTHYROIDISM, UNSPECIFIED TYPE: ICD-10-CM

## 2018-02-26 DIAGNOSIS — E10.42 DIABETIC POLYNEUROPATHY ASSOCIATED WITH TYPE 1 DIABETES MELLITUS (HCC): ICD-10-CM

## 2018-02-26 LAB — GLUCOSE BLD-MCNC: 149 MG/DL

## 2018-02-26 PROCEDURE — 99214 OFFICE O/P EST MOD 30 MIN: CPT | Performed by: INTERNAL MEDICINE

## 2018-02-26 PROCEDURE — 82962 GLUCOSE BLOOD TEST: CPT | Performed by: INTERNAL MEDICINE

## 2018-02-26 RX ORDER — LISINOPRIL 10 MG/1
10 TABLET ORAL DAILY
Qty: 90 TABLET | Refills: 2 | Status: SHIPPED | OUTPATIENT
Start: 2018-02-26 | End: 2019-04-05 | Stop reason: SDUPTHER

## 2018-02-26 RX ORDER — BUPRENORPHINE HYDROCHLORIDE 75 UG/1
FILM, SOLUBLE BUCCAL
COMMUNITY
Start: 2018-02-24 | End: 2018-05-22

## 2018-02-26 RX ORDER — PREGABALIN 150 MG/1
150 CAPSULE ORAL 2 TIMES DAILY
Qty: 180 CAPSULE | Refills: 0 | Status: CANCELLED | OUTPATIENT
Start: 2018-02-26 | End: 2018-05-26

## 2018-02-26 RX ORDER — LEVOTHYROXINE SODIUM 0.03 MG/1
TABLET ORAL
Qty: 90 TABLET | Refills: 3 | Status: SHIPPED | OUTPATIENT
Start: 2018-02-26 | End: 2019-04-19 | Stop reason: SDUPTHER

## 2018-02-26 RX ORDER — PREGABALIN 150 MG/1
150 CAPSULE ORAL 2 TIMES DAILY
Qty: 60 CAPSULE | Refills: 3 | Status: SHIPPED | OUTPATIENT
Start: 2018-02-26 | End: 2018-02-26 | Stop reason: SDUPTHER

## 2018-02-26 ASSESSMENT — ENCOUNTER SYMPTOMS
BACK PAIN: 1
EYES NEGATIVE: 1

## 2018-02-26 NOTE — PROGRESS NOTES
10.2 mg/dL 8.8    Hemoglobin A1C Latest Ref Range: 4.8 - 5.9 % 7.5 (H)    TSH Latest Ref Range: 0.270 - 4.200 uIU/mL 8.540 (H)    T4 Free Latest Ref Range: 0.93 - 1.70 ng/dL 1.27    Creatinine, Ur Latest Ref Range: Not Established mg/dL  114.8   Microalbumin Creatinine Ratio Latest Ref Range: 0.0 - 30.0 mg/G  69.7 (H)   Microalbumin, Random Urine Latest Ref Range: Not Established mg/dL  8.00 (H)     Assessment:       1. Uncontrolled type 1 diabetes mellitus with complication (HCC)  POCT Glucose    Basic Metabolic Panel    Hemoglobin A1C    Microalbumin / Creatinine Urine Ratio   2. Hypothyroidism, unspecified type  T4, Free    TSH without Reflex   3. Diabetic polyneuropathy associated with type 1 diabetes mellitus ([facility]     4. Diabetic nephropathy associated with type 1 diabetes mellitus ([facility]             Plan:       Orders Placed This Encounter   Procedures    Basic Metabolic Panel     Standing Status:   Future     Standing Expiration Date:   2/26/2019    Hemoglobin A1C     Standing Status:   Future     Standing Expiration Date:   2/26/2019    T4, Free     Standing Status:   Future     Standing Expiration Date:   2/26/2019    TSH without Reflex     Standing Status:   Future     Standing Expiration Date:   2/26/2019    Microalbumin / Creatinine Urine Ratio     Standing Status:   Future     Standing Expiration Date:   2/26/2019    POCT Glucose     Orders Placed This Encounter   Medications    lisinopril (PRINIVIL;ZESTRIL) 10 MG tablet     Sig: Take 1 tablet by mouth daily     Dispense:  90 tablet     Refill:  2    levothyroxine (SYNTHROID) 25 MCG tablet     Sig: Once a day   Total dose 225 mcg daily     Dispense:  90 tablet     Refill:  3    DISCONTD: pregabalin (LYRICA) 150 MG capsule     Sig: Take 1 capsule by mouth 2 times daily for 89 days.      Dispense:  60 capsule     Refill:  3       Continue current insulin regimen increase Synthroid dose to 225 µg daily total  More than 50% of 25 minutes spent in patient education counseling review of   continue current insulin  setting  Basal rate 12 AM to 8 AM 1.35 units per hour  8 AM to 12 PM 1.5 units per hour  12 PM to 8 PM 1.6 units per hour  8 PM to 12 AM 1.5 units per hour    Insulin to carb ratio 10    Sensitivity 10    Controlled substances monitoring: [place] report reviewed today- activity consistent with treatment plan.   Will hold off Lyrica as patient is getting a cut through pain management

## 2018-03-01 ENCOUNTER — TELEPHONE (OUTPATIENT)
Dept: FAMILY MEDICINE CLINIC | Age: 48
End: 2018-03-01

## 2018-03-01 NOTE — TELEPHONE ENCOUNTER
Patient called she was given [place] 75 mcg 2 times daily by Dr. Luis Daniel Gaviria started taking Tuesday 2/27/18 its now causing her to feel jittery and shaky she was wondering if she is still able to take her xanax with this medication please advise

## 2018-03-19 RX ORDER — TRAZODONE HYDROCHLORIDE 50 MG/1
TABLET ORAL
Qty: 270 TABLET | Refills: 0 | Status: SHIPPED | OUTPATIENT
Start: 2018-03-19 | End: 2018-03-30

## 2018-03-30 ENCOUNTER — OFFICE VISIT (OUTPATIENT)
Dept: INTERNAL MEDICINE CLINIC | Age: 48
End: 2018-03-30
Payer: COMMERCIAL

## 2018-03-30 VITALS
HEART RATE: 101 BPM | TEMPERATURE: 98.6 F | OXYGEN SATURATION: 98 % | SYSTOLIC BLOOD PRESSURE: 130 MMHG | WEIGHT: 293 LBS | DIASTOLIC BLOOD PRESSURE: 80 MMHG | RESPIRATION RATE: 18 BRPM | BODY MASS INDEX: 45.99 KG/M2 | HEIGHT: 67 IN

## 2018-03-30 DIAGNOSIS — J40 BRONCHITIS: ICD-10-CM

## 2018-03-30 PROCEDURE — 99213 OFFICE O/P EST LOW 20 MIN: CPT | Performed by: NURSE PRACTITIONER

## 2018-03-30 RX ORDER — AZITHROMYCIN 250 MG/1
TABLET, FILM COATED ORAL
Qty: 1 PACKET | Refills: 0 | Status: SHIPPED | OUTPATIENT
Start: 2018-03-30 | End: 2018-04-09

## 2018-03-30 ASSESSMENT — ENCOUNTER SYMPTOMS
ABDOMINAL PAIN: 0
SINUS PRESSURE: 0
DIARRHEA: 0
VOMITING: 0
SHORTNESS OF BREATH: 0
COUGH: 1
NAUSEA: 0
BACK PAIN: 1
SORE THROAT: 0
WHEEZING: 0

## 2018-05-22 ENCOUNTER — OFFICE VISIT (OUTPATIENT)
Dept: INTERNAL MEDICINE CLINIC | Age: 48
End: 2018-05-22
Payer: COMMERCIAL

## 2018-05-22 VITALS
SYSTOLIC BLOOD PRESSURE: 126 MMHG | WEIGHT: 293 LBS | DIASTOLIC BLOOD PRESSURE: 72 MMHG | TEMPERATURE: 98.3 F | HEART RATE: 94 BPM | OXYGEN SATURATION: 92 % | BODY MASS INDEX: 45.99 KG/M2 | HEIGHT: 67 IN

## 2018-05-22 DIAGNOSIS — F41.0 PANIC ATTACKS: Primary | ICD-10-CM

## 2018-05-22 PROCEDURE — 99213 OFFICE O/P EST LOW 20 MIN: CPT | Performed by: FAMILY MEDICINE

## 2018-05-22 RX ORDER — TRAMADOL HYDROCHLORIDE 50 MG/1
TABLET ORAL
Refills: 0 | COMMUNITY
Start: 2018-05-17 | End: 2018-09-10

## 2018-05-22 RX ORDER — QUETIAPINE FUMARATE 50 MG/1
TABLET, FILM COATED ORAL
Qty: 270 TABLET | Refills: 3 | Status: SHIPPED | OUTPATIENT
Start: 2018-05-22 | End: 2019-07-03 | Stop reason: SDUPTHER

## 2018-05-22 ASSESSMENT — ENCOUNTER SYMPTOMS
RESPIRATORY NEGATIVE: 1
EYES NEGATIVE: 1
RHINORRHEA: 0
GASTROINTESTINAL NEGATIVE: 1
COUGH: 0
CHEST TIGHTNESS: 0

## 2018-06-19 DIAGNOSIS — E03.9 HYPOTHYROIDISM, UNSPECIFIED TYPE: ICD-10-CM

## 2018-06-19 LAB
ANION GAP SERPL CALCULATED.3IONS-SCNC: 15 MEQ/L (ref 7–13)
BUN BLDV-MCNC: 11 MG/DL (ref 6–20)
CALCIUM SERPL-MCNC: 9.1 MG/DL (ref 8.6–10.2)
CHLORIDE BLD-SCNC: 97 MEQ/L (ref 98–107)
CO2: 26 MEQ/L (ref 22–29)
CREAT SERPL-MCNC: 0.84 MG/DL (ref 0.5–0.9)
CREATININE URINE: 169.6 MG/DL
GFR AFRICAN AMERICAN: >60
GFR NON-AFRICAN AMERICAN: >60
GLUCOSE BLD-MCNC: 177 MG/DL (ref 74–109)
HBA1C MFR BLD: 8.1 % (ref 4.8–5.9)
MICROALBUMIN UR-MCNC: 1.9 MG/DL
MICROALBUMIN/CREAT UR-RTO: 11.2 MG/G (ref 0–30)
POTASSIUM SERPL-SCNC: 4.4 MEQ/L (ref 3.5–5.1)
SODIUM BLD-SCNC: 138 MEQ/L (ref 132–144)
T4 FREE: 1.32 NG/DL (ref 0.93–1.7)
TSH SERPL DL<=0.05 MIU/L-ACNC: 3.27 UIU/ML (ref 0.27–4.2)

## 2018-06-19 RX ORDER — PANTOPRAZOLE SODIUM 40 MG/1
TABLET, DELAYED RELEASE ORAL
Qty: 90 TABLET | Refills: 3 | Status: SHIPPED | OUTPATIENT
Start: 2018-06-19 | End: 2019-07-03 | Stop reason: SDUPTHER

## 2018-06-21 RX ORDER — TRAZODONE HYDROCHLORIDE 50 MG/1
TABLET ORAL
Qty: 270 TABLET | Refills: 0 | Status: SHIPPED | OUTPATIENT
Start: 2018-06-21 | End: 2019-04-05 | Stop reason: SDUPTHER

## 2018-06-22 ENCOUNTER — OFFICE VISIT (OUTPATIENT)
Dept: ENDOCRINOLOGY | Age: 48
End: 2018-06-22
Payer: COMMERCIAL

## 2018-06-22 VITALS
BODY MASS INDEX: 45.99 KG/M2 | WEIGHT: 293 LBS | SYSTOLIC BLOOD PRESSURE: 130 MMHG | HEART RATE: 96 BPM | HEIGHT: 67 IN | DIASTOLIC BLOOD PRESSURE: 82 MMHG

## 2018-06-22 DIAGNOSIS — E03.9 HYPOTHYROIDISM, UNSPECIFIED TYPE: ICD-10-CM

## 2018-06-22 DIAGNOSIS — E10.69 TYPE 1 DIABETES MELLITUS WITH OTHER SPECIFIED COMPLICATION (HCC): Primary | ICD-10-CM

## 2018-06-22 PROCEDURE — 99214 OFFICE O/P EST MOD 30 MIN: CPT | Performed by: INTERNAL MEDICINE

## 2018-06-22 ASSESSMENT — ENCOUNTER SYMPTOMS
EYES NEGATIVE: 1
BACK PAIN: 1

## 2018-08-21 ENCOUNTER — OFFICE VISIT (OUTPATIENT)
Dept: INTERNAL MEDICINE CLINIC | Age: 48
End: 2018-08-21
Payer: COMMERCIAL

## 2018-08-21 VITALS
WEIGHT: 293 LBS | RESPIRATION RATE: 17 BRPM | OXYGEN SATURATION: 96 % | SYSTOLIC BLOOD PRESSURE: 122 MMHG | TEMPERATURE: 98 F | DIASTOLIC BLOOD PRESSURE: 60 MMHG | BODY MASS INDEX: 53.53 KG/M2 | HEART RATE: 91 BPM

## 2018-08-21 DIAGNOSIS — E13.3299: ICD-10-CM

## 2018-08-21 DIAGNOSIS — F41.0 PANIC ATTACKS: Primary | ICD-10-CM

## 2018-08-21 PROCEDURE — 99213 OFFICE O/P EST LOW 20 MIN: CPT | Performed by: FAMILY MEDICINE

## 2018-08-21 RX ORDER — TRAMADOL HYDROCHLORIDE 100 MG/1
TABLET, FILM COATED, EXTENDED RELEASE ORAL
Refills: 0 | COMMUNITY
Start: 2018-08-13 | End: 2018-11-26 | Stop reason: ALTCHOICE

## 2018-08-21 RX ORDER — ALPRAZOLAM 0.5 MG/1
TABLET ORAL
Refills: 1 | COMMUNITY
Start: 2018-08-13 | End: 2019-01-22 | Stop reason: SDUPTHER

## 2018-08-21 RX ORDER — ALPRAZOLAM 0.5 MG/1
0.5 TABLET ORAL 3 TIMES DAILY PRN
Qty: 90 TABLET | Refills: 1 | Status: SHIPPED | OUTPATIENT
Start: 2018-08-21 | End: 2018-09-10 | Stop reason: SDUPTHER

## 2018-08-21 RX ORDER — PYRIDOXINE HCL (VITAMIN B6) 100 MG
TABLET ORAL
COMMUNITY
Start: 2004-01-06

## 2018-08-21 ASSESSMENT — PATIENT HEALTH QUESTIONNAIRE - PHQ9
SUM OF ALL RESPONSES TO PHQ QUESTIONS 1-9: 0
1. LITTLE INTEREST OR PLEASURE IN DOING THINGS: 0
SUM OF ALL RESPONSES TO PHQ QUESTIONS 1-9: 0
SUM OF ALL RESPONSES TO PHQ9 QUESTIONS 1 & 2: 0
2. FEELING DOWN, DEPRESSED OR HOPELESS: 0

## 2018-08-21 ASSESSMENT — ENCOUNTER SYMPTOMS
COUGH: 0
RHINORRHEA: 0
RESPIRATORY NEGATIVE: 1
GASTROINTESTINAL NEGATIVE: 1
CHEST TIGHTNESS: 0
EYES NEGATIVE: 1

## 2018-09-10 ENCOUNTER — OFFICE VISIT (OUTPATIENT)
Dept: INTERNAL MEDICINE CLINIC | Age: 48
End: 2018-09-10
Payer: COMMERCIAL

## 2018-09-10 VITALS
OXYGEN SATURATION: 97 % | HEART RATE: 95 BPM | SYSTOLIC BLOOD PRESSURE: 128 MMHG | TEMPERATURE: 97 F | DIASTOLIC BLOOD PRESSURE: 70 MMHG | BODY MASS INDEX: 45.99 KG/M2 | RESPIRATION RATE: 20 BRPM | WEIGHT: 293 LBS | HEIGHT: 67 IN

## 2018-09-10 DIAGNOSIS — S90.111A CONTUSION OF RIGHT GREAT TOE WITHOUT DAMAGE TO NAIL, INITIAL ENCOUNTER: ICD-10-CM

## 2018-09-10 DIAGNOSIS — L23.7 ALLERGIC CONTACT DERMATITIS DUE TO PLANTS, EXCEPT FOOD: Primary | ICD-10-CM

## 2018-09-10 PROCEDURE — 96372 THER/PROPH/DIAG INJ SC/IM: CPT | Performed by: FAMILY MEDICINE

## 2018-09-10 PROCEDURE — 99213 OFFICE O/P EST LOW 20 MIN: CPT | Performed by: FAMILY MEDICINE

## 2018-09-10 RX ORDER — MOMETASONE FUROATE 1 MG/G
CREAM TOPICAL
Qty: 45 G | Refills: 2 | Status: SHIPPED | OUTPATIENT
Start: 2018-09-10

## 2018-09-10 RX ORDER — TRAMADOL HYDROCHLORIDE 100 MG/1
TABLET, EXTENDED RELEASE ORAL
Refills: 0 | COMMUNITY
Start: 2018-09-04 | End: 2018-09-10

## 2018-09-10 RX ORDER — METHYLPREDNISOLONE ACETATE 80 MG/ML
80 INJECTION, SUSPENSION INTRA-ARTICULAR; INTRALESIONAL; INTRAMUSCULAR; SOFT TISSUE ONCE
Status: COMPLETED | OUTPATIENT
Start: 2018-09-10 | End: 2018-09-10

## 2018-09-10 RX ADMIN — METHYLPREDNISOLONE ACETATE 80 MG: 80 INJECTION, SUSPENSION INTRA-ARTICULAR; INTRALESIONAL; INTRAMUSCULAR; SOFT TISSUE at 12:25

## 2018-09-10 ASSESSMENT — ENCOUNTER SYMPTOMS
GASTROINTESTINAL NEGATIVE: 1
CHEST TIGHTNESS: 0
RESPIRATORY NEGATIVE: 1
EYES NEGATIVE: 1
RHINORRHEA: 0
COUGH: 0

## 2018-09-10 NOTE — PROGRESS NOTES
 traMADol (ULTRAM ER) 100 MG TB24 extended release tablet TAKE 1 TABLET BY MOUTH every night AS NEEDED FOR PAIN  0    Calcium Carb-Cholecalciferol (CALCIUM 600 + D) 600-200 MG-UNIT TABS one a day      ALPRAZolam (XANAX) 0.5 MG tablet TAKE 1 TABLET BY MOUTH THREE TIMES DAILY AS NEEDED for sleep and FOR ANXIETY FOR 30 DAYS  1    traZODone (DESYREL) 50 MG tablet TAKE 3 TABLETS AT BEDTIME 270 tablet 0    pantoprazole (PROTONIX) 40 MG tablet TAKE 1 TABLET DAILY 90 tablet 3    HUMALOG 100 UNIT/ML injection vial INJECT PER INSULIN PUMP MAX DOSE 100 UNITS  PER DAY 90 mL 2    QUEtiapine (SEROQUEL) 50 MG tablet Take two inn the evening one in the am 270 tablet 3    LYRICA 150 MG capsule Place 150 mg into the right eye 2 times daily. 0    lisinopril (PRINIVIL;ZESTRIL) 10 MG tablet Take 1 tablet by mouth daily 90 tablet 2    levothyroxine (SYNTHROID) 25 MCG tablet Once a day   Total dose 225 mcg daily 90 tablet 3    levothyroxine (SYNTHROID) 200 MCG tablet TAKE 1 TABLET DAILY 90 tablet 2    metFORMIN (GLUCOPHAGE) 500 MG tablet TAKE 1 TABLET TWICE A DAY WITH MEALS 180 tablet 2    escitalopram (LEXAPRO) 20 MG tablet Take 1 tablet by mouth daily 90 tablet 3    vitamin D (CHOLECALCIFEROL) 1000 UNIT TABS tablet Take 1,000 Units by mouth daily      Pyridoxine HCl (VITAMIN B-6) 100 MG tablet Take 100 mg by mouth daily      insulin glargine (LANTUS) 100 UNIT/ML injection vial 40 units at bedtime (Patient taking differently: Indications: Pt to use as backup if insulin pump fails 40 units at bedtime) 40 mL 3    Insulin Syringe-Needle U-100 ([employer] INSULIN SYRINGE) 31G X 5/16\" 0.5 ML MISC 1 each by Does not apply route daily 300 each 3    INSULIN INFUSION PUMP by Does not apply route. No current facility-administered medications on file prior to visit.       No Known Allergies  Health Maintenance   Topic Date Due    DTaP/Tdap/Td vaccine (1 - Tdap) 11/21/2018 (Originally 11/1/1989)    Lipid screen  08/21/2019 (Originally 6/10/2015)    Cervical cancer screen  08/21/2019 (Originally 11/1/1991)    Pneumococcal med risk (1 of 1 - PPSV23) 08/21/2019 (Originally 11/1/1989)    Flu vaccine (1) 09/10/2019 (Originally 9/1/2018)    HIV screen  11/21/2028 (Originally 11/1/1985)    A1C test (Diabetic or Prediabetic)  06/19/2019    Diabetic microalbuminuria test  06/19/2019    TSH testing  06/19/2019    Potassium monitoring  06/19/2019    Creatinine monitoring  06/19/2019    Diabetic foot exam  06/22/2019    Diabetic retinal exam  06/22/2019       Review of Systems    Review of Systems   Constitutional: Negative for activity change, appetite change, fatigue and fever. HENT: Negative for congestion and rhinorrhea. Eyes: Negative. Respiratory: Negative. Negative for cough and chest tightness. Cardiovascular: Negative. Gastrointestinal: Negative. Endocrine: Negative. Genitourinary: Negative. Musculoskeletal: Negative. Skin: Positive for rash. Neurological: Negative for dizziness, light-headedness and numbness. Hematological: Negative. Psychiatric/Behavioral: Negative. Physical Exam  Vitals:    09/10/18 1128   BP: 128/70   Site: Left Upper Arm   Position: Sitting   Cuff Size: Large Adult   Pulse: 95   Resp: 20   Temp: 97 °F (36.1 °C)   TempSrc: Oral   SpO2: 97%   Weight: (!) 341 lb (154.7 kg)   Height: 5' 7\" (1.702 m)       Physical Exam   Constitutional: She appears well-developed and well-nourished. HENT:   Right Ear: External ear normal.   Left Ear: External ear normal.   Eyes: Pupils are equal, round, and reactive to light. Conjunctivae are normal.   Neck: Normal range of motion. Neck supple. No thyromegaly present. Cardiovascular: Normal rate, regular rhythm and normal heart sounds. No murmur heard. Pulmonary/Chest: Effort normal and breath sounds normal.   Abdominal: Soft. Bowel sounds are normal. She exhibits no distension. There is no tenderness.    Musculoskeletal: Normal

## 2018-09-24 ENCOUNTER — TELEPHONE (OUTPATIENT)
Dept: ENDOCRINOLOGY | Age: 48
End: 2018-09-24

## 2018-09-25 DIAGNOSIS — E03.9 HYPOTHYROIDISM, UNSPECIFIED TYPE: ICD-10-CM

## 2018-09-25 DIAGNOSIS — E10.69 TYPE 1 DIABETES MELLITUS WITH OTHER SPECIFIED COMPLICATION (HCC): ICD-10-CM

## 2018-09-25 LAB
ANION GAP SERPL CALCULATED.3IONS-SCNC: 11 MEQ/L (ref 7–13)
BUN BLDV-MCNC: 14 MG/DL (ref 6–20)
CALCIUM SERPL-MCNC: 9.4 MG/DL (ref 8.6–10.2)
CHLORIDE BLD-SCNC: 98 MEQ/L (ref 98–107)
CHOLESTEROL, TOTAL: 164 MG/DL (ref 0–199)
CO2: 29 MEQ/L (ref 22–29)
CREAT SERPL-MCNC: 0.94 MG/DL (ref 0.5–0.9)
GFR AFRICAN AMERICAN: >60
GFR NON-AFRICAN AMERICAN: >60
GLUCOSE BLD-MCNC: 150 MG/DL (ref 74–109)
HBA1C MFR BLD: 8 % (ref 4.8–5.9)
HDLC SERPL-MCNC: 67 MG/DL (ref 40–59)
LDL CHOLESTEROL CALCULATED: 84 MG/DL (ref 0–129)
POTASSIUM SERPL-SCNC: 4.6 MEQ/L (ref 3.5–5.1)
SODIUM BLD-SCNC: 138 MEQ/L (ref 132–144)
T4 FREE: 1.35 NG/DL (ref 0.93–1.7)
TRIGL SERPL-MCNC: 67 MG/DL (ref 0–200)
TSH SERPL DL<=0.05 MIU/L-ACNC: 3.83 UIU/ML (ref 0.27–4.2)

## 2018-09-27 ENCOUNTER — OFFICE VISIT (OUTPATIENT)
Dept: ENDOCRINOLOGY | Age: 48
End: 2018-09-27
Payer: COMMERCIAL

## 2018-09-27 VITALS
WEIGHT: 293 LBS | SYSTOLIC BLOOD PRESSURE: 122 MMHG | HEART RATE: 96 BPM | HEIGHT: 67 IN | BODY MASS INDEX: 45.99 KG/M2 | DIASTOLIC BLOOD PRESSURE: 82 MMHG

## 2018-09-27 DIAGNOSIS — L03.113 CELLULITIS OF RIGHT UPPER EXTREMITY: ICD-10-CM

## 2018-09-27 DIAGNOSIS — E10.69 TYPE 1 DIABETES MELLITUS WITH OTHER SPECIFIED COMPLICATION (HCC): Primary | ICD-10-CM

## 2018-09-27 DIAGNOSIS — E03.9 HYPOTHYROIDISM, UNSPECIFIED TYPE: ICD-10-CM

## 2018-09-27 PROCEDURE — 99214 OFFICE O/P EST MOD 30 MIN: CPT | Performed by: INTERNAL MEDICINE

## 2018-09-27 RX ORDER — CEPHALEXIN 500 MG/1
500 CAPSULE ORAL 4 TIMES DAILY
Qty: 40 CAPSULE | Refills: 0 | Status: SHIPPED | OUTPATIENT
Start: 2018-09-27 | End: 2018-09-27 | Stop reason: SDUPTHER

## 2018-09-27 RX ORDER — CEPHALEXIN 500 MG/1
500 CAPSULE ORAL 4 TIMES DAILY
Qty: 40 CAPSULE | Refills: 0 | Status: SHIPPED | OUTPATIENT
Start: 2018-09-27 | End: 2018-11-26 | Stop reason: ALTCHOICE

## 2018-09-27 NOTE — PROGRESS NOTES
a day, Disp: , Rfl:     ALPRAZolam (XANAX) 0.5 MG tablet, TAKE 1 TABLET BY MOUTH THREE TIMES DAILY AS NEEDED for sleep and FOR ANXIETY FOR 30 DAYS, Disp: , Rfl: 1    traZODone (DESYREL) 50 MG tablet, TAKE 3 TABLETS AT BEDTIME, Disp: 270 tablet, Rfl: 0    pantoprazole (PROTONIX) 40 MG tablet, TAKE 1 TABLET DAILY, Disp: 90 tablet, Rfl: 3    HUMALOG 100 UNIT/ML injection vial, INJECT PER INSULIN PUMP MAX DOSE 100 UNITS  PER DAY, Disp: 90 mL, Rfl: 2    QUEtiapine (SEROQUEL) 50 MG tablet, Take two inn the evening one in the am, Disp: 270 tablet, Rfl: 3    LYRICA 150 MG capsule, Place 150 mg into the right eye 2 times daily. , Disp: , Rfl: 0    lisinopril (PRINIVIL;ZESTRIL) 10 MG tablet, Take 1 tablet by mouth daily, Disp: 90 tablet, Rfl: 2    levothyroxine (SYNTHROID) 25 MCG tablet, Once a day  Total dose 225 mcg daily, Disp: 90 tablet, Rfl: 3    levothyroxine (SYNTHROID) 200 MCG tablet, TAKE 1 TABLET DAILY, Disp: 90 tablet, Rfl: 2    metFORMIN (GLUCOPHAGE) 500 MG tablet, TAKE 1 TABLET TWICE A DAY WITH MEALS, Disp: 180 tablet, Rfl: 2    escitalopram (LEXAPRO) 20 MG tablet, Take 1 tablet by mouth daily, Disp: 90 tablet, Rfl: 3    vitamin D (CHOLECALCIFEROL) 1000 UNIT TABS tablet, Take 1,000 Units by mouth daily, Disp: , Rfl:     Pyridoxine HCl (VITAMIN B-6) 100 MG tablet, Take 100 mg by mouth daily, Disp: , Rfl:     insulin glargine (LANTUS) 100 UNIT/ML injection vial, 40 units at bedtime (Patient taking differently: Indications: Pt to use as backup if insulin pump fails 40 units at bedtime), Disp: 40 mL, Rfl: 3    Insulin Syringe-Needle U-100 ([employer] INSULIN SYRINGE) 31G X 5/16\" 0.5 ML MISC, 1 each by Does not apply route daily, Disp: 300 each, Rfl: 3    INSULIN INFUSION PUMP, by Does not apply route.  , Disp: , Rfl:     Review of Systems   Constitutional: Positive for fatigue. Eyes: Negative. Endocrine: Negative. Skin: Positive for color change.    All other systems reviewed and are negative. Vitals:    09/27/18 1032   BP: 122/82   Site: Left Upper Arm   Position: Sitting   Cuff Size: Large Adult   Pulse: 96   Weight: (!) 339 lb (153.8 kg)   Height: 5' 7\" (1.702 m)       Objective:   Physical Exam   Constitutional: She appears well-developed and well-nourished. HENT:   Head: Atraumatic. Eyes: Conjunctivae are normal.   Neck: Neck supple. Cardiovascular: Normal rate and normal heart sounds. Pulmonary/Chest: Effort normal.   Musculoskeletal: Normal range of motion. Neurological: She is alert. Skin:        Psychiatric: She has a normal mood and affect. Results for Lashae Hdz (MRN [id number]) as of 9/27/2018 10:53   Ref. Range 9/25/2018 10:08   Sodium Latest Ref Range: 132 - 144 mEq/L 138   Potassium Latest Ref Range: 3.5 - 5.1 mEq/L 4.6   Chloride Latest Ref Range: 98 - 107 mEq/L 98   CO2 Latest Ref Range: 22 - 29 mEq/L 29   BUN Latest Ref Range: 6 - 20 mg/dL 14   Creatinine Latest Ref Range: 0.50 - 0.90 mg/dL 0.94 (H)   Anion Gap Latest Ref Range: 7 - 13 mEq/L 11   GFR Non- Latest Ref Range: >60  >60.0   GFR African American Latest Ref Range: >60  >60.0   Glucose Latest Ref Range: 74 - 109 mg/dL 150 (H)   Calcium Latest Ref Range: 8.6 - 10.2 mg/dL 9.4   Cholesterol, Total Latest Ref Range: 0 - 199 mg/dL 164   HDL Cholesterol Latest Ref Range: 40 - 59 mg/dL 67 (H)   LDL Calculated Latest Ref Range: 0 - 129 mg/dL 84   Triglycerides Latest Ref Range: 0 - 200 mg/dL 67   Hemoglobin A1C Latest Ref Range: 4.8 - 5.9 % 8.0 (H)   TSH Latest Ref Range: 0.270 - 4.200 uIU/mL 3.830   T4 Free Latest Ref Range: 0.93 - 1.70 ng/dL 1.35       Assessment:       Diagnosis Orders   1. Type 1 diabetes mellitus with other specified complication (HCC)  Basic Metabolic Panel    Hemoglobin A1C    T4, Free    TSH without Reflex   2. Hypothyroidism, unspecified type     3.  Cellulitis of right upper extremity             Plan:      Orders Placed This Encounter   Procedures    Basic Metabolic Panel     Standing Status:   Future     Standing Expiration Date:   9/27/2019    Hemoglobin A1C     Standing Status:   Future     Standing Expiration Date:   9/27/2019    T4, Free     Standing Status:   Future     Standing Expiration Date:   9/27/2019    TSH without Reflex     Standing Status:   Future     Standing Expiration Date:   9/27/2019     Orders Placed This Encounter   Medications    DISCONTD: cephALEXin (KEFLEX) 500 MG capsule     Sig: Take 1 capsule by mouth 4 times daily     Dispense:  40 capsule     Refill:  0    cephALEXin (KEFLEX) 500 MG capsule     Sig: Take 1 capsule by mouth 4 times daily     Dispense:  40 capsule     Refill:  0     Continue insulin pump as per current setting   Continue synthroid 225 mcg daily   More than 50 % of 25 min spend in pt education/sounseling review of pump setting         Xiomara Villasenor MD

## 2018-09-28 ASSESSMENT — ENCOUNTER SYMPTOMS
COLOR CHANGE: 1
EYES NEGATIVE: 1

## 2018-10-03 RX ORDER — LEVOTHYROXINE SODIUM 0.2 MG/1
TABLET ORAL
Qty: 90 TABLET | Refills: 2 | Status: SHIPPED | OUTPATIENT
Start: 2018-10-03 | End: 2019-07-16 | Stop reason: SDUPTHER

## 2018-11-26 ENCOUNTER — OFFICE VISIT (OUTPATIENT)
Dept: INTERNAL MEDICINE CLINIC | Age: 48
End: 2018-11-26
Payer: COMMERCIAL

## 2018-11-26 VITALS
WEIGHT: 293 LBS | RESPIRATION RATE: 18 BRPM | SYSTOLIC BLOOD PRESSURE: 132 MMHG | TEMPERATURE: 97.4 F | OXYGEN SATURATION: 96 % | DIASTOLIC BLOOD PRESSURE: 86 MMHG | BODY MASS INDEX: 48.82 KG/M2 | HEART RATE: 110 BPM | HEIGHT: 65 IN

## 2018-11-26 DIAGNOSIS — F41.9 ANXIETY: Primary | ICD-10-CM

## 2018-11-26 PROCEDURE — 99213 OFFICE O/P EST LOW 20 MIN: CPT | Performed by: FAMILY MEDICINE

## 2018-11-26 RX ORDER — TRAMADOL HYDROCHLORIDE 50 MG/1
TABLET ORAL
Refills: 0 | Status: ON HOLD | COMMUNITY
Start: 2018-11-07 | End: 2021-04-20 | Stop reason: HOSPADM

## 2018-11-26 ASSESSMENT — ENCOUNTER SYMPTOMS
GASTROINTESTINAL NEGATIVE: 1
CHEST TIGHTNESS: 0
EYES NEGATIVE: 1
RHINORRHEA: 0
RESPIRATORY NEGATIVE: 1
COUGH: 0

## 2018-11-26 NOTE — PROGRESS NOTES
(SYNTHROID) 200 MCG tablet TAKE 1 TABLET DAILY 90 tablet 2    mometasone (ELOCON) 0.1 % cream Apply topically daily. 45 g 2    Calcium Carb-Cholecalciferol (CALCIUM 600 + D) 600-200 MG-UNIT TABS one a day      ALPRAZolam (XANAX) 0.5 MG tablet TAKE 1 TABLET BY MOUTH THREE TIMES DAILY AS NEEDED for sleep and FOR ANXIETY FOR 30 DAYS  1    traZODone (DESYREL) 50 MG tablet TAKE 3 TABLETS AT BEDTIME 270 tablet 0    pantoprazole (PROTONIX) 40 MG tablet TAKE 1 TABLET DAILY 90 tablet 3    HUMALOG 100 UNIT/ML injection vial INJECT PER INSULIN PUMP MAX DOSE 100 UNITS  PER DAY 90 mL 2    QUEtiapine (SEROQUEL) 50 MG tablet Take two inn the evening one in the am 270 tablet 3    LYRICA 150 MG capsule Place 150 mg into the right eye 2 times daily. 0    lisinopril (PRINIVIL;ZESTRIL) 10 MG tablet Take 1 tablet by mouth daily 90 tablet 2    levothyroxine (SYNTHROID) 25 MCG tablet Once a day   Total dose 225 mcg daily 90 tablet 3    metFORMIN (GLUCOPHAGE) 500 MG tablet TAKE 1 TABLET TWICE A DAY WITH MEALS 180 tablet 2    Pyridoxine HCl (VITAMIN B-6) 100 MG tablet Take 100 mg by mouth daily      insulin glargine (LANTUS) 100 UNIT/ML injection vial 40 units at bedtime (Patient taking differently: Indications: Pt to use as backup if insulin pump fails 40 units at bedtime) 40 mL 3    Insulin Syringe-Needle U-100 ([employer] INSULIN SYRINGE) 31G X 5/16\" 0.5 ML MISC 1 each by Does not apply route daily 300 each 3    INSULIN INFUSION PUMP by Does not apply route. No current facility-administered medications for this visit. Current Outpatient Prescriptions on File Prior to Visit   Medication Sig Dispense Refill    levothyroxine (SYNTHROID) 200 MCG tablet TAKE 1 TABLET DAILY 90 tablet 2    mometasone (ELOCON) 0.1 % cream Apply topically daily.  45 g 2    Calcium Carb-Cholecalciferol (CALCIUM 600 + D) 600-200 MG-UNIT TABS one a day      ALPRAZolam (XANAX) 0.5 MG tablet TAKE 1 TABLET BY MOUTH THREE TIMES DAILY AS None          Plan  Follow up in three months. No orders of the defined types were placed in this encounter. No Follow-up on file.   Kalina Salgado MD

## 2019-01-22 DIAGNOSIS — F41.0 PANIC ATTACKS: Primary | ICD-10-CM

## 2019-01-23 RX ORDER — ALPRAZOLAM 0.5 MG/1
TABLET ORAL
Qty: 30 TABLET | Refills: 2 | Status: SHIPPED | OUTPATIENT
Start: 2019-01-23 | End: 2019-03-25 | Stop reason: SDUPTHER

## 2019-01-25 ENCOUNTER — NURSE ONLY (OUTPATIENT)
Dept: INTERNAL MEDICINE CLINIC | Age: 49
End: 2019-01-25

## 2019-01-25 DIAGNOSIS — Z79.899 ENCOUNTER FOR LONG-TERM CURRENT USE OF MEDICATION: Primary | ICD-10-CM

## 2019-01-25 DIAGNOSIS — Z79.899 ENCOUNTER FOR LONG-TERM CURRENT USE OF MEDICATION: ICD-10-CM

## 2019-01-28 LAB
6-ACETYLMORPHINE: NOT DETECTED
7-AMINOCLONAZEPAM: NOT DETECTED
ALPHA-OH-ALPRAZOLAM: PRESENT
ALPRAZOLAM: PRESENT
AMPHETAMINE: NOT DETECTED
BARBITURATES: NOT DETECTED
BENZOYLECGONINE: NOT DETECTED
BUPRENORPHINE: NOT DETECTED
CARISOPRODOL: NOT DETECTED
CLONAZEPAM: NOT DETECTED
CODEINE: NOT DETECTED
CREATININE URINE: 178.3 MG/DL (ref 20–400)
DIAZEPAM: NOT DETECTED
EER PAIN MGT DRUG PANEL, HIGH RES/EMIT U: NORMAL
ETHYL GLUCURONIDE: NOT DETECTED
FENTANYL: NOT DETECTED
HYDROCODONE: NOT DETECTED
HYDROMORPHONE: NOT DETECTED
LORAZEPAM: NOT DETECTED
MARIJUANA METABOLITE: NOT DETECTED
MDA: NOT DETECTED
MDEA: NOT DETECTED
MDMA URINE: NOT DETECTED
MEPERIDINE: NOT DETECTED
METHADONE: NOT DETECTED
METHAMPHETAMINE: NOT DETECTED
METHYLPHENIDATE: NOT DETECTED
MIDAZOLAM: NOT DETECTED
MORPHINE: NOT DETECTED
NORBUPRENORPHINE, FREE: NOT DETECTED
NORDIAZEPAM: NOT DETECTED
NORFENTANYL: NOT DETECTED
NORHYDROCODONE, URINE: NOT DETECTED
NOROXYCODONE: NOT DETECTED
NOROXYMORPHONE, URINE: NOT DETECTED
OXAZEPAM: NOT DETECTED
OXYCODONE: NOT DETECTED
OXYMORPHONE: NOT DETECTED
PAIN MANAGEMENT DRUG PANEL: NORMAL
PCP: NOT DETECTED
PHENTERMINE: NOT DETECTED
PROPOXYPHENE: NOT DETECTED
TAPENTADOL, URINE: NOT DETECTED
TAPENTADOL-O-SULFATE, URINE: NOT DETECTED
TEMAZEPAM: NOT DETECTED
TRAMADOL: PRESENT
ZOLPIDEM: NOT DETECTED

## 2019-01-29 DIAGNOSIS — E10.69 TYPE 1 DIABETES MELLITUS WITH OTHER SPECIFIED COMPLICATION (HCC): ICD-10-CM

## 2019-01-29 LAB
ANION GAP SERPL CALCULATED.3IONS-SCNC: 16 MEQ/L (ref 7–13)
BUN BLDV-MCNC: 9 MG/DL (ref 6–20)
CALCIUM SERPL-MCNC: 9.5 MG/DL (ref 8.6–10.2)
CHLORIDE BLD-SCNC: 101 MEQ/L (ref 98–107)
CO2: 25 MEQ/L (ref 22–29)
CREAT SERPL-MCNC: 0.94 MG/DL (ref 0.5–0.9)
GFR AFRICAN AMERICAN: >60
GFR NON-AFRICAN AMERICAN: >60
GLUCOSE BLD-MCNC: 127 MG/DL (ref 74–109)
HBA1C MFR BLD: 7.7 % (ref 4.8–5.9)
POTASSIUM SERPL-SCNC: 4.3 MEQ/L (ref 3.5–5.1)
SODIUM BLD-SCNC: 142 MEQ/L (ref 132–144)
T4 FREE: 1.25 NG/DL (ref 0.93–1.7)
TSH SERPL DL<=0.05 MIU/L-ACNC: 4.3 UIU/ML (ref 0.27–4.2)

## 2019-01-31 ENCOUNTER — OFFICE VISIT (OUTPATIENT)
Dept: ENDOCRINOLOGY | Age: 49
End: 2019-01-31
Payer: COMMERCIAL

## 2019-01-31 VITALS
WEIGHT: 293 LBS | HEIGHT: 67 IN | SYSTOLIC BLOOD PRESSURE: 143 MMHG | BODY MASS INDEX: 45.99 KG/M2 | DIASTOLIC BLOOD PRESSURE: 83 MMHG | OXYGEN SATURATION: 95 % | HEART RATE: 85 BPM

## 2019-01-31 DIAGNOSIS — E10.9 TYPE 1 DIABETES MELLITUS WITHOUT COMPLICATION (HCC): Primary | ICD-10-CM

## 2019-01-31 DIAGNOSIS — E03.9 ACQUIRED HYPOTHYROIDISM: ICD-10-CM

## 2019-01-31 PROCEDURE — 99213 OFFICE O/P EST LOW 20 MIN: CPT | Performed by: INTERNAL MEDICINE

## 2019-02-25 ENCOUNTER — TELEPHONE (OUTPATIENT)
Dept: INTERNAL MEDICINE CLINIC | Age: 49
End: 2019-02-25

## 2019-02-25 DIAGNOSIS — F41.0 PANIC ATTACKS: ICD-10-CM

## 2019-02-26 RX ORDER — ALPRAZOLAM 0.5 MG/1
0.5 TABLET ORAL 3 TIMES DAILY PRN
Qty: 90 TABLET | Refills: 1 | Status: SHIPPED | OUTPATIENT
Start: 2019-02-26 | End: 2019-08-06 | Stop reason: SDUPTHER

## 2019-03-25 ENCOUNTER — OFFICE VISIT (OUTPATIENT)
Dept: INTERNAL MEDICINE CLINIC | Age: 49
End: 2019-03-25
Payer: COMMERCIAL

## 2019-03-25 VITALS
BODY MASS INDEX: 45.99 KG/M2 | HEART RATE: 108 BPM | RESPIRATION RATE: 18 BRPM | TEMPERATURE: 97.6 F | DIASTOLIC BLOOD PRESSURE: 72 MMHG | WEIGHT: 293 LBS | SYSTOLIC BLOOD PRESSURE: 136 MMHG | HEIGHT: 67 IN | OXYGEN SATURATION: 99 %

## 2019-03-25 DIAGNOSIS — F41.9 ANXIETY: Primary | ICD-10-CM

## 2019-03-25 PROCEDURE — 99213 OFFICE O/P EST LOW 20 MIN: CPT | Performed by: FAMILY MEDICINE

## 2019-03-25 ASSESSMENT — ENCOUNTER SYMPTOMS
RESPIRATORY NEGATIVE: 1
CHEST TIGHTNESS: 0
GASTROINTESTINAL NEGATIVE: 1
COUGH: 0
EYES NEGATIVE: 1
RHINORRHEA: 0

## 2019-03-25 ASSESSMENT — PATIENT HEALTH QUESTIONNAIRE - PHQ9
SUM OF ALL RESPONSES TO PHQ9 QUESTIONS 1 & 2: 0
SUM OF ALL RESPONSES TO PHQ QUESTIONS 1-9: 0
SUM OF ALL RESPONSES TO PHQ QUESTIONS 1-9: 0
1. LITTLE INTEREST OR PLEASURE IN DOING THINGS: 0
2. FEELING DOWN, DEPRESSED OR HOPELESS: 0

## 2019-04-04 ENCOUNTER — HOSPITAL ENCOUNTER (OUTPATIENT)
Dept: CT IMAGING | Age: 49
Discharge: HOME | End: 2019-04-06
Payer: COMMERCIAL

## 2019-04-04 DIAGNOSIS — M25.571 PAIN, JOINT, FOOT, RIGHT: ICD-10-CM

## 2019-04-04 PROCEDURE — 73700 CT LOWER EXTREMITY W/O DYE: CPT

## 2019-04-08 RX ORDER — TRAZODONE HYDROCHLORIDE 50 MG/1
TABLET ORAL
Qty: 270 TABLET | Refills: 0 | Status: SHIPPED | OUTPATIENT
Start: 2019-04-08 | End: 2019-07-03 | Stop reason: SDUPTHER

## 2019-04-08 RX ORDER — LISINOPRIL 10 MG/1
TABLET ORAL
Qty: 90 TABLET | Refills: 2 | Status: SHIPPED | OUTPATIENT
Start: 2019-04-08 | End: 2019-07-03 | Stop reason: SDUPTHER

## 2019-04-19 RX ORDER — LEVOTHYROXINE SODIUM 0.03 MG/1
TABLET ORAL
Qty: 90 TABLET | Refills: 3 | Status: SHIPPED | OUTPATIENT
Start: 2019-04-19 | End: 2019-07-03 | Stop reason: SDUPTHER

## 2019-05-09 ENCOUNTER — HOSPITAL ENCOUNTER (OUTPATIENT)
Dept: GENERAL RADIOLOGY | Age: 49
Discharge: HOME | End: 2019-05-11
Payer: COMMERCIAL

## 2019-05-09 DIAGNOSIS — R52 PAIN: ICD-10-CM

## 2019-05-09 PROCEDURE — 73630 X-RAY EXAM OF FOOT: CPT

## 2019-06-03 ENCOUNTER — TELEPHONE (OUTPATIENT)
Dept: ENDOCRINOLOGY | Age: 49
End: 2019-06-03

## 2019-06-03 NOTE — TELEPHONE ENCOUNTER
Patient calls in today requesting we complete the CMN form for her pump supplies from [employer] as soon as possible. It was faxed to us on Thursday and today.     Any questions please call patient at [phone]

## 2019-06-06 DIAGNOSIS — E03.9 ACQUIRED HYPOTHYROIDISM: ICD-10-CM

## 2019-06-06 DIAGNOSIS — E10.9 TYPE 1 DIABETES MELLITUS WITHOUT COMPLICATION (HCC): ICD-10-CM

## 2019-06-06 LAB
ANION GAP SERPL CALCULATED.3IONS-SCNC: 16 MEQ/L (ref 9–15)
BUN BLDV-MCNC: 10 MG/DL (ref 6–20)
CALCIUM SERPL-MCNC: 9 MG/DL (ref 8.5–9.9)
CHLORIDE BLD-SCNC: 102 MEQ/L (ref 95–107)
CO2: 26 MEQ/L (ref 20–31)
CREAT SERPL-MCNC: 0.94 MG/DL (ref 0.5–0.9)
GFR AFRICAN AMERICAN: >60
GFR NON-AFRICAN AMERICAN: >60
GLUCOSE BLD-MCNC: 136 MG/DL (ref 70–99)
HBA1C MFR BLD: 8.4 % (ref 4.8–5.9)
POTASSIUM SERPL-SCNC: 4.2 MEQ/L (ref 3.4–4.9)
SODIUM BLD-SCNC: 144 MEQ/L (ref 135–144)
T4 FREE: 1.35 NG/DL (ref 0.84–1.68)
TSH REFLEX: 4.96 UIU/ML (ref 0.44–3.86)

## 2019-06-10 ENCOUNTER — OFFICE VISIT (OUTPATIENT)
Dept: ENDOCRINOLOGY | Age: 49
End: 2019-06-10
Payer: COMMERCIAL

## 2019-06-10 VITALS
WEIGHT: 293 LBS | HEIGHT: 67 IN | SYSTOLIC BLOOD PRESSURE: 139 MMHG | DIASTOLIC BLOOD PRESSURE: 82 MMHG | BODY MASS INDEX: 45.99 KG/M2 | HEART RATE: 92 BPM

## 2019-06-10 DIAGNOSIS — E03.9 ACQUIRED HYPOTHYROIDISM: Primary | ICD-10-CM

## 2019-06-10 DIAGNOSIS — I73.9 CLAUDICATION OF BOTH LOWER EXTREMITIES (HCC): ICD-10-CM

## 2019-06-10 DIAGNOSIS — E10.9 TYPE 1 DIABETES MELLITUS WITHOUT COMPLICATION (HCC): ICD-10-CM

## 2019-06-10 PROCEDURE — 82962 GLUCOSE BLOOD TEST: CPT | Performed by: INTERNAL MEDICINE

## 2019-06-10 PROCEDURE — 99213 OFFICE O/P EST LOW 20 MIN: CPT | Performed by: INTERNAL MEDICINE

## 2019-06-10 NOTE — PROGRESS NOTES
degenerative changes, radiodense foreign bodies, worrisome bone destruction, or discrete soft tissue abnormalities identified.         The a small sized plantar calcaneal spur and mild to moderate atherosclerotic calcifications are noted.                   Impression       SUBACUTE NONDISPLACED SMALL AVULSION FRACTURE OF THE PLANTAR BASE OF THE FIRST METATARSAL. Results for Jasper Gonzalez (MRN [id number]) as of 6/10/2019 13:57   Ref.  Range 6/6/2019 10:42   Sodium Latest Ref Range: 135 - 144 mEq/L 144   Potassium Latest Ref Range: 3.4 - 4.9 mEq/L 4.2   Chloride Latest Ref Range: 95 - 107 mEq/L 102   CO2 Latest Ref Range: 20 - 31 mEq/L 26   BUN Latest Ref Range: 6 - 20 mg/dL 10   Creatinine Latest Ref Range: 0.50 - 0.90 mg/dL 0.94 (H)   Anion Gap Latest Ref Range: 9 - 15 mEq/L 16 (H)   GFR Non- Latest Ref Range: >60  >60.0   GFR African American Latest Ref Range: >60  >60.0   Glucose Latest Ref Range: 70 - 99 mg/dL 136 (H)   Calcium Latest Ref Range: 8.5 - 9.9 mg/dL 9.0   Hemoglobin A1C Latest Ref Range: 4.8 - 5.9 % 8.4 (H)   TSH Latest Ref Range: 0.440 - 3.860 uIU/mL 4.960 (H)   T4 Free Latest Ref Range: 0.84 - 1.68 ng/dL 1.35         Patient Active Problem List   Diagnosis    Pilonidal cyst    Panic attacks    Obesity    Type 1 diabetes mellitus (HCC)    Background retinopathy due to secondary diabetes (HCC)    Chronic lymphocytic thyroiditis    Age related cataract    Acquired hypothyroidism    Vitamin D deficiency    Brow ptosis    Long-term insulin use (HCC)    Nuclear sclerotic cataract of both eyes     No Known Allergies      Current Outpatient Medications:     levothyroxine (SYNTHROID) 25 MCG tablet, Once a day  Total dose 225 mcg daily, Disp: 90 tablet, Rfl: 3    lisinopril (PRINIVIL;ZESTRIL) 10 MG tablet, TAKE 1 TABLET DAILY, Disp: 90 tablet, Rfl: 2    HUMALOG 100 UNIT/ML injection vial, INJECT PER INSULIN PUMP MAX DOSE 100 UNITS  PER DAY, Disp: 90 mL, Rfl: 2   Musculoskeletal: Normal range of motion. Neurological: She is alert. Skin:        Psychiatric: She has a normal mood and affect. Assessment:       Diagnosis Orders   1. Acquired hypothyroidism  T4, Free    TSH with Reflex   2. Type 1 diabetes mellitus without complication (HCC)  POCT Glucose    Basic Metabolic Panel    Hemoglobin A1C    Microalbumin / Creatinine Urine Ratio   3.  Claudication of both lower extremities ([address]  US [facility]OS[facility]MA[facility] REST AND POST TREAD COMPLETE           Plan:      Orders Placed This Encounter   Procedures    US JOS[facility] ISMAEL REST AND POST TREAD COMPLETE     Standing Status:   Future     Standing Expiration Date:   6/9/2020     Scheduling Instructions:      [id number] [facility] cardiology to read     Order Specific Question:   Reason for exam:     Answer:   caludication lower legs    Basic Metabolic Panel     Standing Status:   Future     Standing Expiration Date:   6/10/2020    Hemoglobin A1C     Standing Status:   Future     Standing Expiration Date:   6/10/2020    Microalbumin / Creatinine Urine Ratio     Standing Status:   Future     Standing Expiration Date:   6/10/2020    T4, Free     Standing Status:   Future     Standing Expiration Date:   6/10/2020    TSH with Reflex     Standing Status:   Future     Standing Expiration Date:   6/10/2020    POCT Glucose     Reviewed pump settings  Basal rate 12 AM 1.5 units/h 8 AM 1.6 units/h 12 PM 1.7 units/h and 8 PM 1.6 units/h  Carb ratio was 7.  sensitivity was 10  To new current insulin regimen plus metformin 500 mg twice a day for diabetes and Synthroid to 25 mcg for hypothyroidism        VANNESSA Zheng MD

## 2019-06-11 ENCOUNTER — TELEPHONE (OUTPATIENT)
Dept: ENDOCRINOLOGY | Age: 49
End: 2019-06-11

## 2019-06-11 NOTE — TELEPHONE ENCOUNTER
Patient is calling because her DM supply company has notified her that the office has not completed the CMN form for patient's DM supplies. She stated she is getting low on supplies and this form is holding up the process of her supplies being delivered. She would like a call back today.  [phone]

## 2019-06-13 ENCOUNTER — TELEPHONE (OUTPATIENT)
Dept: ENDOCRINOLOGY | Age: 49
End: 2019-06-13

## 2019-06-13 NOTE — TELEPHONE ENCOUNTER
Patient called to follow up on a form for updated RX for guardian sensors for pump-[employer].   She has been able to order supplies through [employer]

## 2019-06-20 ENCOUNTER — HOSPITAL ENCOUNTER (OUTPATIENT)
Dept: ULTRASOUND IMAGING | Age: 49
Discharge: HOME | End: 2019-06-22
Payer: COMMERCIAL

## 2019-06-20 DIAGNOSIS — I73.9 CLAUDICATION OF BOTH LOWER EXTREMITIES (HCC): ICD-10-CM

## 2019-06-20 PROCEDURE — 93924 LWR XTR VASC STDY BILAT: CPT | Performed by: INTERNAL MEDICINE

## 2019-06-20 PROCEDURE — 93924 LWR XTR VASC STDY BILAT: CPT

## 2019-07-02 ENCOUNTER — PATIENT MESSAGE (OUTPATIENT)
Dept: FAMILY MEDICINE CLINIC | Age: 49
End: 2019-07-02

## 2019-07-03 RX ORDER — LISINOPRIL 10 MG/1
TABLET ORAL
Qty: 90 TABLET | Refills: 3 | Status: SHIPPED | OUTPATIENT
Start: 2019-07-03 | End: 2019-07-03 | Stop reason: SDUPTHER

## 2019-07-03 RX ORDER — LISINOPRIL 10 MG/1
TABLET ORAL
Qty: 90 TABLET | Refills: 3 | Status: SHIPPED | OUTPATIENT
Start: 2019-07-03 | End: 2020-07-20

## 2019-07-03 RX ORDER — PANTOPRAZOLE SODIUM 40 MG/1
TABLET, DELAYED RELEASE ORAL
Qty: 90 TABLET | Refills: 3 | Status: SHIPPED | OUTPATIENT
Start: 2019-07-03 | End: 2020-07-20

## 2019-07-03 RX ORDER — QUETIAPINE FUMARATE 50 MG/1
TABLET, FILM COATED ORAL
Qty: 270 TABLET | Refills: 3 | Status: SHIPPED | OUTPATIENT
Start: 2019-07-03 | End: 2020-07-20

## 2019-07-03 RX ORDER — LEVOTHYROXINE SODIUM 0.03 MG/1
TABLET ORAL
Qty: 90 TABLET | Refills: 3 | Status: SHIPPED | OUTPATIENT
Start: 2019-07-03 | End: 2020-07-20 | Stop reason: SDUPTHER

## 2019-07-03 RX ORDER — TRAZODONE HYDROCHLORIDE 50 MG/1
TABLET ORAL
Qty: 270 TABLET | Refills: 3 | Status: SHIPPED | OUTPATIENT
Start: 2019-07-03 | End: 2020-07-20

## 2019-07-17 RX ORDER — LEVOTHYROXINE SODIUM 0.2 MG/1
200 TABLET ORAL DAILY
Qty: 90 TABLET | Refills: 2 | Status: SHIPPED | OUTPATIENT
Start: 2019-07-17 | End: 2020-04-15

## 2019-08-06 ENCOUNTER — OFFICE VISIT (OUTPATIENT)
Dept: FAMILY MEDICINE CLINIC | Age: 49
End: 2019-08-06
Payer: COMMERCIAL

## 2019-08-06 VITALS
WEIGHT: 293 LBS | OXYGEN SATURATION: 98 % | HEIGHT: 67 IN | DIASTOLIC BLOOD PRESSURE: 88 MMHG | TEMPERATURE: 97.3 F | RESPIRATION RATE: 15 BRPM | BODY MASS INDEX: 45.99 KG/M2 | SYSTOLIC BLOOD PRESSURE: 134 MMHG | HEART RATE: 120 BPM

## 2019-08-06 DIAGNOSIS — M25.551 PAIN OF BOTH HIP JOINTS: Primary | ICD-10-CM

## 2019-08-06 DIAGNOSIS — M25.552 PAIN OF BOTH HIP JOINTS: Primary | ICD-10-CM

## 2019-08-06 DIAGNOSIS — F41.0 PANIC ATTACKS: ICD-10-CM

## 2019-08-06 PROCEDURE — 99213 OFFICE O/P EST LOW 20 MIN: CPT | Performed by: FAMILY MEDICINE

## 2019-08-06 RX ORDER — ALBUTEROL SULFATE 90 UG/1
AEROSOL, METERED RESPIRATORY (INHALATION)
Refills: 0 | COMMUNITY
Start: 2019-07-03

## 2019-08-06 RX ORDER — VENLAFAXINE HYDROCHLORIDE 75 MG/1
CAPSULE, EXTENDED RELEASE ORAL
COMMUNITY
Start: 2005-12-20 | End: 2020-03-04 | Stop reason: CLARIF

## 2019-08-06 RX ORDER — ALPRAZOLAM 0.5 MG/1
TABLET ORAL
Refills: 1 | COMMUNITY
Start: 2019-06-28 | End: 2019-11-07

## 2019-08-06 RX ORDER — ALPRAZOLAM 0.5 MG/1
0.5 TABLET ORAL 3 TIMES DAILY PRN
Qty: 90 TABLET | Refills: 1 | Status: SHIPPED | OUTPATIENT
Start: 2019-08-06 | End: 2019-11-07 | Stop reason: SDUPTHER

## 2019-08-06 RX ORDER — TRAMADOL HYDROCHLORIDE 100 MG/1
TABLET, FILM COATED, EXTENDED RELEASE ORAL
Refills: 0 | COMMUNITY
Start: 2019-07-17 | End: 2021-04-27

## 2019-08-06 ASSESSMENT — ENCOUNTER SYMPTOMS
GASTROINTESTINAL NEGATIVE: 1
COUGH: 0
RHINORRHEA: 0
CHEST TIGHTNESS: 0
EYES NEGATIVE: 1
RESPIRATORY NEGATIVE: 1

## 2019-08-06 NOTE — PROGRESS NOTES
children: None    Years of education: None    Highest education level: None   Occupational History    None   Social Needs    Financial resource strain: None    Food insecurity:     Worry: None     Inability: None    Transportation needs:     Medical: None     Non-medical: None   Tobacco Use    Smoking status: Never Smoker    Smokeless tobacco: Never Used   Substance and Sexual Activity    Alcohol use: No    Drug use: No    Sexual activity: Yes   Lifestyle    Physical activity:     Days per week: None     Minutes per session: None    Stress: None   Relationships    Social connections:     Talks on phone: None     Gets together: None     Attends Sabianist service: None     Active member of club or organization: None     Attends meetings of clubs or organizations: None     Relationship status: None    Intimate partner violence:     Fear of current or ex partner: None     Emotionally abused: None     Physically abused: None     Forced sexual activity: None   Other Topics Concern    None   Social History Narrative    None     Current Outpatient Medications   Medication Sig Dispense Refill    albuterol sulfate  (90 Base) MCG/ACT inhaler INHALE 2 PUFFS EVERY 4 TO 6 HOURS AS NEEDED  0    ALPRAZolam (XANAX) 0.5 MG tablet TAKE 1 TABLET BY MOUTH THREE TIMES DAILY AS NEEDED FOR ANXIETY OR SLEEP FOR UP TO 30 DAYS  1    traMADol (ULTRAM ER) 100 MG TB24 extended release tablet TAKE 1 TABLET BY MOUTH EVERY night AS NEEDED FOR PAIN FOR 28 DAYS.  0    venlafaxine (EFFEXOR XR) 75 MG extended release capsule Take by mouth      ALPRAZolam (XANAX) 0.5 MG tablet Take 1 tablet by mouth 3 times daily as needed for Sleep or Anxiety for up to 30 days.  90 tablet 1    levothyroxine (SYNTHROID) 200 MCG tablet Take 1 tablet by mouth Daily 90 tablet 2    traZODone (DESYREL) 50 MG tablet TAKE 3 TABLETS AT BEDTIME 270 tablet 3    pantoprazole (PROTONIX) 40 MG tablet TAKE 1 TABLET DAILY 90 tablet 3    QUEtiapine BEDTIME 270 tablet 3    pantoprazole (PROTONIX) 40 MG tablet TAKE 1 TABLET DAILY 90 tablet 3    QUEtiapine (SEROQUEL) 50 MG tablet Take two inn the evening one in the am 270 tablet 3    lisinopril (PRINIVIL;ZESTRIL) 10 MG tablet TAKE 1 TABLET DAILY 90 tablet 3    levothyroxine (SYNTHROID) 25 MCG tablet Once a day   Total dose 225 mcg daily 90 tablet 3    HUMALOG 100 UNIT/ML injection vial INJECT PER INSULIN PUMP MAX DOSE 100 UNITS  PER DAY 90 mL 2    metFORMIN (GLUCOPHAGE) 500 MG tablet TAKE 1 TABLET TWICE A DAY WITH MEALS 180 tablet 2    traMADol (ULTRAM) 50 MG tablet TAKE 1 TABLET BY MOUTH TWICE DAILY AS NEEDED FOR PAIN  0    mometasone (ELOCON) 0.1 % cream Apply topically daily. 45 g 2    Calcium Carb-Cholecalciferol (CALCIUM 600 + D) 600-200 MG-UNIT TABS one a day      LYRICA 150 MG capsule Place 150 mg into the right eye 2 times daily. 0    Pyridoxine HCl (VITAMIN B-6) 100 MG tablet Take 100 mg by mouth daily      insulin glargine (LANTUS) 100 UNIT/ML injection vial 40 units at bedtime (Patient taking differently: Indications: Pt to use as backup if insulin pump fails 40 units at bedtime) 40 mL 3    Insulin Syringe-Needle U-100 ([employer] INSULIN SYRINGE) 31G X 5/16\" 0.5 ML MISC 1 each by Does not apply route daily 300 each 3    INSULIN INFUSION PUMP by Does not apply route. No current facility-administered medications on file prior to visit.       No Known Allergies  Health Maintenance   Topic Date Due    Pneumococcal 0-64 years Vaccine (1 of 1 - PPSV23) 11/01/1976    DTaP/Tdap/Td vaccine (1 - Tdap) 11/01/1989    Hepatitis B Vaccine (3 of 3 - Risk 3-dose series) 06/28/2011    Diabetic microalbuminuria test  06/19/2019    Diabetic foot exam  06/22/2019    Diabetic retinal exam  06/22/2019    Cervical cancer screen  08/21/2019 (Originally 11/1/1991)    Flu vaccine (1) 09/10/2019 (Originally 9/1/2019)    HIV screen  11/21/2028 (Originally 11/1/1985)    Lipid screen  09/25/2019    A1C

## 2019-11-07 ENCOUNTER — OFFICE VISIT (OUTPATIENT)
Dept: FAMILY MEDICINE CLINIC | Age: 49
End: 2019-11-07
Payer: COMMERCIAL

## 2019-11-07 VITALS
BODY MASS INDEX: 45.99 KG/M2 | RESPIRATION RATE: 14 BRPM | OXYGEN SATURATION: 97 % | HEIGHT: 67 IN | SYSTOLIC BLOOD PRESSURE: 122 MMHG | WEIGHT: 293 LBS | TEMPERATURE: 98 F | DIASTOLIC BLOOD PRESSURE: 80 MMHG | HEART RATE: 64 BPM

## 2019-11-07 DIAGNOSIS — F41.0 PANIC ATTACKS: Primary | ICD-10-CM

## 2019-11-07 PROCEDURE — 99213 OFFICE O/P EST LOW 20 MIN: CPT | Performed by: FAMILY MEDICINE

## 2019-11-07 RX ORDER — ALPRAZOLAM 0.5 MG/1
0.5 TABLET ORAL 3 TIMES DAILY PRN
Qty: 90 TABLET | Refills: 1 | Status: SHIPPED | OUTPATIENT
Start: 2019-11-07 | End: 2020-01-31

## 2019-11-07 ASSESSMENT — ENCOUNTER SYMPTOMS
GASTROINTESTINAL NEGATIVE: 1
RESPIRATORY NEGATIVE: 1
EYES NEGATIVE: 1
CHEST TIGHTNESS: 0
RHINORRHEA: 0
COUGH: 0

## 2019-11-19 DIAGNOSIS — E03.9 ACQUIRED HYPOTHYROIDISM: ICD-10-CM

## 2019-11-19 DIAGNOSIS — E10.9 TYPE 1 DIABETES MELLITUS WITHOUT COMPLICATION (HCC): ICD-10-CM

## 2019-11-19 LAB
ANION GAP SERPL CALCULATED.3IONS-SCNC: 13 MEQ/L (ref 9–15)
BUN BLDV-MCNC: 16 MG/DL (ref 6–20)
CALCIUM SERPL-MCNC: 9.4 MG/DL (ref 8.5–9.9)
CHLORIDE BLD-SCNC: 102 MEQ/L (ref 95–107)
CO2: 26 MEQ/L (ref 20–31)
CREAT SERPL-MCNC: 0.96 MG/DL (ref 0.5–0.9)
CREATININE URINE: 166.5 MG/DL
GFR AFRICAN AMERICAN: >60
GFR NON-AFRICAN AMERICAN: >60
GLUCOSE BLD-MCNC: 131 MG/DL (ref 70–99)
HBA1C MFR BLD: 9.1 % (ref 4.8–5.9)
MICROALBUMIN UR-MCNC: 1.4 MG/DL
MICROALBUMIN/CREAT UR-RTO: 8.4 MG/G (ref 0–30)
POTASSIUM SERPL-SCNC: 4.4 MEQ/L (ref 3.4–4.9)
SODIUM BLD-SCNC: 141 MEQ/L (ref 135–144)
T4 FREE: 1.5 NG/DL (ref 0.84–1.68)
TSH REFLEX: 4.58 UIU/ML (ref 0.44–3.86)

## 2019-11-22 ENCOUNTER — OFFICE VISIT (OUTPATIENT)
Dept: ENDOCRINOLOGY | Age: 49
End: 2019-11-22
Payer: COMMERCIAL

## 2019-11-22 VITALS
HEART RATE: 86 BPM | BODY MASS INDEX: 45.99 KG/M2 | DIASTOLIC BLOOD PRESSURE: 85 MMHG | SYSTOLIC BLOOD PRESSURE: 133 MMHG | WEIGHT: 293 LBS | HEIGHT: 67 IN

## 2019-11-22 DIAGNOSIS — E10.9 TYPE 1 DIABETES MELLITUS WITHOUT COMPLICATION (HCC): Primary | ICD-10-CM

## 2019-11-22 DIAGNOSIS — E03.9 ACQUIRED HYPOTHYROIDISM: ICD-10-CM

## 2019-11-22 PROCEDURE — 99213 OFFICE O/P EST LOW 20 MIN: CPT | Performed by: INTERNAL MEDICINE

## 2020-01-31 ENCOUNTER — TELEPHONE (OUTPATIENT)
Dept: FAMILY MEDICINE CLINIC | Age: 50
End: 2020-01-31

## 2020-01-31 RX ORDER — ALPRAZOLAM 0.5 MG/1
TABLET ORAL
Qty: 90 TABLET | Refills: 1 | Status: SHIPPED | OUTPATIENT
Start: 2020-01-31 | End: 2020-11-17 | Stop reason: SDUPTHER

## 2020-01-31 NOTE — TELEPHONE ENCOUNTER
Pharmacy  requesting medication refill.  Please approve or deny this request.    Rx requested:  Requested Prescriptions     Pending Prescriptions Disp Refills    ALPRAZolam (XANAX) 0.5 MG tablet [Pharmacy Med Name: ALPRAZOLAM 0.5 MG TABLET] 90 tablet      Sig: TAKE 1 TABLET BY MOUTH THREE TIMES DAILY AS NEEDED for sleep or anxiety for up to 30 days         Last Office Visit:   11/7/2019      Next Visit Date:  Future Appointments   Date Time Provider Sandra Matthew   2/6/2020 10:30 AM Marcelino Pérez  [place], [address]

## 2020-03-04 ENCOUNTER — OFFICE VISIT (OUTPATIENT)
Dept: FAMILY MEDICINE CLINIC | Age: 50
End: 2020-03-04
Payer: COMMERCIAL

## 2020-03-04 ENCOUNTER — TELEPHONE (OUTPATIENT)
Dept: FAMILY MEDICINE CLINIC | Age: 50
End: 2020-03-04

## 2020-03-04 VITALS
RESPIRATION RATE: 18 BRPM | BODY MASS INDEX: 45.99 KG/M2 | DIASTOLIC BLOOD PRESSURE: 60 MMHG | WEIGHT: 293 LBS | SYSTOLIC BLOOD PRESSURE: 120 MMHG | HEIGHT: 67 IN | HEART RATE: 77 BPM | TEMPERATURE: 97.3 F

## 2020-03-04 DIAGNOSIS — Z79.899 LONG-TERM USE OF HIGH-RISK MEDICATION: ICD-10-CM

## 2020-03-04 PROCEDURE — 99213 OFFICE O/P EST LOW 20 MIN: CPT | Performed by: FAMILY MEDICINE

## 2020-03-04 ASSESSMENT — ENCOUNTER SYMPTOMS
COUGH: 0
EYES NEGATIVE: 1
RESPIRATORY NEGATIVE: 1
CHEST TIGHTNESS: 0
RHINORRHEA: 0
GASTROINTESTINAL NEGATIVE: 1

## 2020-03-04 ASSESSMENT — PATIENT HEALTH QUESTIONNAIRE - PHQ9
SUM OF ALL RESPONSES TO PHQ QUESTIONS 1-9: 0
1. LITTLE INTEREST OR PLEASURE IN DOING THINGS: 0
2. FEELING DOWN, DEPRESSED OR HOPELESS: 0
SUM OF ALL RESPONSES TO PHQ QUESTIONS 1-9: 0
SUM OF ALL RESPONSES TO PHQ9 QUESTIONS 1 & 2: 0

## 2020-03-04 NOTE — PROGRESS NOTES
Patient is seen in follow up for   Chief Complaint   Patient presents with   Bertin Noriega for follow up on anxiety doing well. She has been under a lot of stress.     Past Medical History:   Diagnosis Date    Arthritis     right knee and ankle    Pilonidal cyst     Thyroid disease     hypothyroid    Type 1 diabetes ([address]      Patient Active Problem List    Diagnosis Date Noted    Brow ptosis 01/22/2016    Long-term insulin use ([address] 01/22/2016    Nuclear sclerotic cataract of both eyes 01/22/2016    Background retinopathy due to secondary diabetes ([address] 10/16/2014    Age related cataract 10/16/2014    Type 1 diabetes mellitus ([address] 07/02/2014    Obesity 08/18/2013    Panic attacks 11/29/2012    Pilonidal cyst     Vitamin D deficiency 03/28/2011    Chronic lymphocytic thyroiditis 11/05/2002    Acquired hypothyroidism 11/05/2002     Past Surgical History:   Procedure Laterality Date    APPENDECTOMY      ARTHROSCOPY / ARTHROTOMY KNEE Right 11/8/2016    RIGHT KNEE ARTHROSCOPY / MEDIAL MENISCUS TEAR / SUPINE  performed by Segun Juarez MD at [address] [place] [address]     Family History   Problem Relation Age of Onset    Arthritis Mother     Heart Disease Father     High Blood Pressure Father     No Known Problems Daughter      Social History     Socioeconomic History    Marital status:      Spouse name: Not on file    Number of children: Not on file    Years of education: Not on file    Highest education level: Not on file   Occupational History    Not on file   Social Needs    Financial resource strain: Not on file    Food insecurity:     Worry: Not on file     Inability: Not on file    Transportation needs:     Medical: Not on file     Non-medical: Not on file   Tobacco Use    Smoking status: Never Smoker    Smokeless tobacco: Never Used   Substance and Sexual Activity    Alcohol use: No    Drug use: No    Sexual activity: Yes   Lifestyle    Physical activity:     Days per week: Not on file     Minutes per session: Not on file    Stress: Not on file   Relationships    Social connections:     Talks on phone: Not on file     Gets together: Not on file     Attends Synagogue service: Not on file     Active member of club or organization: Not on file     Attends meetings of clubs or organizations: Not on file     Relationship status: Not on file    Intimate partner violence:     Fear of current or ex partner: Not on file     Emotionally abused: Not on file     Physically abused: Not on file     Forced sexual activity: Not on file   Other Topics Concern    Not on file   Social History Narrative    Not on file     Current Outpatient Medications   Medication Sig Dispense Refill    ALPRAZolam (XANAX) 0.5 MG tablet TAKE 1 TABLET BY MOUTH THREE TIMES DAILY AS NEEDED for sleep or anxiety for up to 30 days 90 tablet 1    albuterol sulfate  (90 Base) MCG/ACT inhaler INHALE 2 PUFFS EVERY 4 TO 6 HOURS AS NEEDED  0    traMADol (ULTRAM ER) 100 MG TB24 extended release tablet TAKE 1 TABLET BY MOUTH EVERY night AS NEEDED FOR PAIN FOR 28 DAYS.  0    levothyroxine (SYNTHROID) 200 MCG tablet Take 1 tablet by mouth Daily 90 tablet 2    traZODone (DESYREL) 50 MG tablet TAKE 3 TABLETS AT BEDTIME 270 tablet 3    pantoprazole (PROTONIX) 40 MG tablet TAKE 1 TABLET DAILY 90 tablet 3    QUEtiapine (SEROQUEL) 50 MG tablet Take two inn the evening one in the am 270 tablet 3    lisinopril (PRINIVIL;ZESTRIL) 10 MG tablet TAKE 1 TABLET DAILY 90 tablet 3    levothyroxine (SYNTHROID) 25 MCG tablet Once a day   Total dose 225 mcg daily 90 tablet 3    HUMALOG 100 UNIT/ML injection vial INJECT PER INSULIN PUMP MAX DOSE 100 UNITS  PER DAY 90 mL 2    metFORMIN (GLUCOPHAGE) 500 MG tablet TAKE 1 TABLET TWICE A DAY WITH MEALS 180 tablet 2    traMADol (ULTRAM) 50 MG tablet TAKE 1 TABLET BY MOUTH TWICE DAILY AS NEEDED FOR PAIN  0    mometasone (ELOCON) 0.1 % cream Apply mometasone (ELOCON) 0.1 % cream Apply topically daily. 45 g 2    Calcium Carb-Cholecalciferol (CALCIUM 600 + D) 600-200 MG-UNIT TABS one a day      LYRICA 150 MG capsule Place 150 mg into the right eye 2 times daily. 0    Pyridoxine HCl (VITAMIN B-6) 100 MG tablet Take 100 mg by mouth daily      insulin glargine (LANTUS) 100 UNIT/ML injection vial 40 units at bedtime (Patient taking differently: Indications: Pt to use as backup if insulin pump fails 40 units at bedtime) 40 mL 3    Insulin Syringe-Needle U-100 ([employer] INSULIN SYRINGE) 31G X 5/16\" 0.5 ML MISC 1 each by Does not apply route daily 300 each 3    INSULIN INFUSION PUMP by Does not apply route. No current facility-administered medications on file prior to visit. No Known Allergies  Health Maintenance   Topic Date Due    Pneumococcal 0-64 years Vaccine (1 of 1 - PPSV23) 11/01/1976    DTaP/Tdap/Td vaccine (1 - Tdap) 11/01/1981    Cervical cancer screen  11/01/1991    Hepatitis B vaccine (3 of 3 - Risk 3-dose series) 06/28/2011    Diabetic foot exam  06/22/2019    Diabetic retinal exam  06/22/2019    Flu vaccine (1) 09/01/2019    Lipid screen  09/25/2019    A1C test (Diabetic or Prediabetic)  02/19/2020    HIV screen  11/21/2028 (Originally 11/1/1985)    Shingles Vaccine (1 of 2) 11/01/2020    Diabetic microalbuminuria test  11/19/2020    TSH testing  11/19/2020    Potassium monitoring  11/19/2020    Creatinine monitoring  11/19/2020    Hepatitis A vaccine  Aged Out    Hib vaccine  Aged Out    Meningococcal (ACWY) vaccine  Aged Out       Review of Systems     Review of Systems   Constitutional: Negative for activity change, appetite change, fatigue and fever. HENT: Negative for congestion and rhinorrhea. Eyes: Negative. Respiratory: Negative. Negative for cough and chest tightness. Cardiovascular: Negative. Gastrointestinal: Negative. Endocrine: Negative. Genitourinary: Negative.     Musculoskeletal: Negative. Skin: Negative. Neurological: Negative for dizziness, light-headedness and numbness. Hematological: Negative. Psychiatric/Behavioral: Negative. Physical Exam  Vitals:    03/04/20 0904   BP: 120/60   Pulse: 77   Resp: 18   Temp: 97.3 °F (36.3 °C)   TempSrc: Oral   Weight: (!) 345 lb (156.5 kg)   Height: 5' 7\" (1.702 m)       Physical Exam  Constitutional:       Appearance: She is well-developed. HENT:      Right Ear: External ear normal.      Left Ear: External ear normal.   Eyes:      Pupils: Pupils are equal, round, and reactive to light. Neck:      Musculoskeletal: Normal range of motion and neck supple. Thyroid: No thyromegaly. Cardiovascular:      Rate and Rhythm: Normal rate and regular rhythm. Heart sounds: Normal heart sounds. No murmur. No friction rub. No gallop. Pulmonary:      Effort: Pulmonary effort is normal. No respiratory distress. Breath sounds: No wheezing or rales. Chest:      Chest wall: No tenderness. Abdominal:      General: Bowel sounds are normal. There is no distension. Palpations: Abdomen is soft. There is no mass. Tenderness: There is no abdominal tenderness. There is no guarding or rebound. Musculoskeletal: Normal range of motion. Lymphadenopathy:      Cervical: No cervical adenopathy. Skin:     General: Skin is warm and dry. Neurological:      Mental Status: She is alert and oriented to person, place, and time. Cranial Nerves: No cranial nerve deficit. Coordination: Coordination normal.         Assessment   Diagnosis Orders   1. Panic attacks     2.  Type 1 diabetes mellitus without complication (HCC)   DIABETES FOOT EXAM     Problem List     Panic attacks - Primary    Relevant Medications    LORazepam (ATIVAN) injection 1 mg (Completed)    traZODone (DESYREL) 50 MG tablet    ALPRAZolam (XANAX) 0.5 MG tablet    Type 1 diabetes mellitus (HCC)    Relevant Medications    insulin glargine (LANTUS) 100

## 2020-03-06 LAB
6-ACETYLMORPHINE: NOT DETECTED
7-AMINOCLONAZEPAM: NOT DETECTED
ALPHA-OH-ALPRAZOLAM: PRESENT
ALPRAZOLAM: PRESENT
AMPHETAMINE: NOT DETECTED
BARBITURATES: NOT DETECTED
BENZOYLECGONINE: NOT DETECTED
BUPRENORPHINE: NOT DETECTED
CARISOPRODOL: NOT DETECTED
CLONAZEPAM: NOT DETECTED
CODEINE: NOT DETECTED
CREATININE URINE: 99.9 MG/DL (ref 20–400)
DIAZEPAM: NOT DETECTED
EER PAIN MGT DRUG PANEL, HIGH RES/EMIT U: NORMAL
ETHYL GLUCURONIDE: NOT DETECTED
FENTANYL: NOT DETECTED
HYDROCODONE: NOT DETECTED
HYDROMORPHONE: NOT DETECTED
LORAZEPAM: NOT DETECTED
MARIJUANA METABOLITE: NOT DETECTED
MDA: NOT DETECTED
MDEA: NOT DETECTED
MDMA URINE: NOT DETECTED
MEPERIDINE: NOT DETECTED
METHADONE: NOT DETECTED
METHAMPHETAMINE: NOT DETECTED
METHYLPHENIDATE: NOT DETECTED
MIDAZOLAM: NOT DETECTED
MORPHINE: NOT DETECTED
NORBUPRENORPHINE, FREE: NOT DETECTED
NORDIAZEPAM: NOT DETECTED
NORFENTANYL: NOT DETECTED
NORHYDROCODONE, URINE: NOT DETECTED
NOROXYCODONE: NOT DETECTED
NOROXYMORPHONE, URINE: NOT DETECTED
OXAZEPAM: NOT DETECTED
OXYCODONE: NOT DETECTED
OXYMORPHONE: NOT DETECTED
PAIN MANAGEMENT DRUG PANEL: NORMAL
PCP: NOT DETECTED
PHENTERMINE: NOT DETECTED
PROPOXYPHENE: NOT DETECTED
TAPENTADOL, URINE: NOT DETECTED
TAPENTADOL-O-SULFATE, URINE: NOT DETECTED
TEMAZEPAM: NOT DETECTED
TRAMADOL: PRESENT
ZOLPIDEM: NOT DETECTED

## 2020-04-15 RX ORDER — LEVOTHYROXINE SODIUM 200 MCG
TABLET ORAL
Qty: 90 TABLET | Refills: 2 | Status: SHIPPED | OUTPATIENT
Start: 2020-04-15 | End: 2020-11-17

## 2020-07-20 RX ORDER — TRAZODONE HYDROCHLORIDE 50 MG/1
TABLET ORAL
Qty: 270 TABLET | Refills: 3 | Status: SHIPPED | OUTPATIENT
Start: 2020-07-20 | End: 2021-08-11

## 2020-07-20 RX ORDER — QUETIAPINE FUMARATE 50 MG/1
TABLET, FILM COATED ORAL
Qty: 270 TABLET | Refills: 3 | Status: SHIPPED | OUTPATIENT
Start: 2020-07-20 | End: 2021-08-11

## 2020-07-20 RX ORDER — LISINOPRIL 10 MG/1
TABLET ORAL
Qty: 90 TABLET | Refills: 3 | Status: SHIPPED | OUTPATIENT
Start: 2020-07-20 | End: 2021-08-11

## 2020-07-20 RX ORDER — LEVOTHYROXINE SODIUM 0.03 MG/1
TABLET ORAL
Qty: 90 TABLET | Refills: 3 | Status: SHIPPED | OUTPATIENT
Start: 2020-07-20 | End: 2020-11-17

## 2020-07-20 RX ORDER — PANTOPRAZOLE SODIUM 40 MG/1
TABLET, DELAYED RELEASE ORAL
Qty: 90 TABLET | Refills: 3 | Status: SHIPPED | OUTPATIENT
Start: 2020-07-20 | End: 2021-08-11

## 2020-08-06 DIAGNOSIS — E03.9 ACQUIRED HYPOTHYROIDISM: ICD-10-CM

## 2020-08-06 DIAGNOSIS — E10.9 TYPE 1 DIABETES MELLITUS WITHOUT COMPLICATION (HCC): ICD-10-CM

## 2020-08-06 LAB
ANION GAP SERPL CALCULATED.3IONS-SCNC: 13 MEQ/L (ref 9–15)
BUN BLDV-MCNC: 14 MG/DL (ref 6–20)
CALCIUM SERPL-MCNC: 9 MG/DL (ref 8.5–9.9)
CHLORIDE BLD-SCNC: 98 MEQ/L (ref 95–107)
CO2: 24 MEQ/L (ref 20–31)
CREAT SERPL-MCNC: 1.15 MG/DL (ref 0.5–0.9)
CREATININE URINE: 185.6 MG/DL
GFR AFRICAN AMERICAN: >60
GFR NON-AFRICAN AMERICAN: 50
GLUCOSE BLD-MCNC: 218 MG/DL (ref 70–99)
HBA1C MFR BLD: 8.8 % (ref 4.8–5.9)
MICROALBUMIN UR-MCNC: 1.6 MG/DL
MICROALBUMIN/CREAT UR-RTO: 8.6 MG/G (ref 0–30)
POTASSIUM SERPL-SCNC: 4.9 MEQ/L (ref 3.4–4.9)
SODIUM BLD-SCNC: 135 MEQ/L (ref 135–144)
T4 FREE: 1.35 NG/DL (ref 0.84–1.68)
TSH REFLEX: 7.56 UIU/ML (ref 0.44–3.86)

## 2020-08-10 ENCOUNTER — OFFICE VISIT (OUTPATIENT)
Dept: ENDOCRINOLOGY | Age: 50
End: 2020-08-10
Payer: COMMERCIAL

## 2020-08-10 VITALS
WEIGHT: 293 LBS | HEIGHT: 67 IN | DIASTOLIC BLOOD PRESSURE: 79 MMHG | HEART RATE: 105 BPM | BODY MASS INDEX: 45.99 KG/M2 | SYSTOLIC BLOOD PRESSURE: 135 MMHG

## 2020-08-10 PROCEDURE — 99214 OFFICE O/P EST MOD 30 MIN: CPT | Performed by: INTERNAL MEDICINE

## 2020-08-10 PROCEDURE — 3052F HG A1C>EQUAL 8.0%<EQUAL 9.0%: CPT | Performed by: INTERNAL MEDICINE

## 2020-08-10 RX ORDER — LEVOTHYROXINE SODIUM 0.12 MG/1
TABLET ORAL
Qty: 180 TABLET | Refills: 3 | Status: SHIPPED | OUTPATIENT
Start: 2020-08-10 | End: 2020-10-20 | Stop reason: SDUPTHER

## 2020-08-10 ASSESSMENT — ENCOUNTER SYMPTOMS: EYES NEGATIVE: 1

## 2020-08-10 NOTE — PROGRESS NOTES
Subjective:      Patient ID: Fredy Luis is a 52 y.o. female. Follow-up on type 1 diabetes hypothyroidism lab review patient currently on [employer] 670 pump is also using a sensor  Diabetes   She presents for her follow-up diabetic visit. She has type 1 diabetes mellitus. Symptoms are worsening. Diabetic complications include retinopathy. Risk factors for coronary artery disease include obesity. Current diabetic treatment includes insulin pump. (Reviewed 2week download on the pump average blood sugar was 156 ± 48  68% time in range  Auto mode was 68%  Total carbs per day was 248 ± 99  Overall blood sugars have been in the 1 50-1 80 in the first half second half she has had higher blood sugars in the afternoon and post dinner and post bedtime  No severe hypoglycemia  Lab Results       Component                Value               Date                       LABA1C                   8.8 (H)             08/06/2020              )        obesity Body mass index is 56.07 kg/m². Hypothyroidism on Synthroid 225 mcg daily patient taking it on a regular basis TSH was high does complain of fatigue    Results for Marleen Vela (MRN [id number]) as of 8/10/2020 09:45   Ref.  Range 8/6/2020 09:55 8/6/2020 10:14   Sodium Latest Ref Range: 135 - 144 mEq/L 135    Potassium Latest Ref Range: 3.4 - 4.9 mEq/L 4.9    Chloride Latest Ref Range: 95 - 107 mEq/L 98    CO2 Latest Ref Range: 20 - 31 mEq/L 24    BUN Latest Ref Range: 6 - 20 mg/dL 14    Creatinine Latest Ref Range: 0.50 - 0.90 mg/dL 1.15 (H)    Anion Gap Latest Ref Range: 9 - 15 mEq/L 13    GFR Non- Latest Ref Range: >60  50.0 (L)    GFR  Latest Ref Range: >60  >60.0    Glucose Latest Ref Range: 70 - 99 mg/dL 218 (H)    Calcium Latest Ref Range: 8.5 - 9.9 mg/dL 9.0    Hemoglobin A1C Latest Ref Range: 4.8 - 5.9 % 8.8 (H)    TSH Latest Ref Range: 0.440 - 3.860 uIU/mL 7.560 (H)    T4 Free Latest Ref Range: 0.84 - 1.68 ng/dL 1.35    Creatinine, Ur Latest Ref Range: Not Established mg/dL  185.6   Microalbumin Creatinine Ratio Latest Ref Range: 0.0 - 30.0 mg/G  8.6   Microalbumin, Random Urine Latest Ref Range: Not Established mg/dL  1.60     Results for Yodit Lucas (MRN [id number]) as of 8/10/2020 09:45   Ref.  Range 11/19/2019 09:57 11/19/2019 13:12 3/4/2020 09:52 8/6/2020 09:55   Sodium Latest Ref Range: 135 - 144 mEq/L 141   135   Potassium Latest Ref Range: 3.4 - 4.9 mEq/L 4.4   4.9   Chloride Latest Ref Range: 95 - 107 mEq/L 102   98   CO2 Latest Ref Range: 20 - 31 mEq/L 26   24   BUN Latest Ref Range: 6 - 20 mg/dL 16   14   Creatinine Latest Ref Range: 0.50 - 0.90 mg/dL 0.96 (H)   1.15 (H)   Anion Gap Latest Ref Range: 9 - 15 mEq/L 13   13   GFR Non- Latest Ref Range: >60  >60.0   50.0 (L)   GFR  Latest Ref Range: >60  >60.0   >60.0   Glucose Latest Ref Range: 70 - 99 mg/dL 131 (H)   218 (H)   Calcium Latest Ref Range: 8.5 - 9.9 mg/dL 9.4   9.0   Hemoglobin A1C Latest Ref Range: 4.8 - 5.9 % 9.1 (H)   8.8 (H)   TSH Latest Ref Range: 0.440 - 3.860 uIU/mL 4.580 (H)   7.560 (H)   T4 Free Latest Ref Range: 0.84 - 1.68 ng/dL 1.50   1.35     Patient Active Problem List   Diagnosis    Pilonidal cyst    Panic attacks    Obesity    Type 1 diabetes mellitus (HCC)    Background retinopathy due to secondary diabetes (HCC)    Chronic lymphocytic thyroiditis    Age related cataract    Acquired hypothyroidism    Vitamin D deficiency    Brow ptosis    Long-term insulin use (HCC)    Nuclear sclerotic cataract of both eyes       No Known Allergies      Current Outpatient Medications:     levothyroxine (SYNTHROID) 125 MCG tablet, 2 po daily, Disp: 180 tablet, Rfl: 03    traZODone (DESYREL) 50 MG tablet, TAKE 3 TABLETS AT BEDTIME, Disp: 270 tablet, Rfl: 3    lisinopril (PRINIVIL;ZESTRIL) 10 MG tablet, TAKE 1 TABLET DAILY, Disp: 90 tablet, Rfl: 3    pantoprazole (PROTONIX) 40 MG tablet, TAKE 1 TABLET DAILY, Disp: 90 tablet, Rfl: 3    QUEtiapine (SEROQUEL) 50 MG tablet, TAKE 1 TABLET EVERY MORNINGAND TAKE 2 TABLETS EVERY   EVENING, Disp: 270 tablet, Rfl: 3    levothyroxine (SYNTHROID) 25 MCG tablet, Once a day  Total dose 225 mcg daily, Disp: 90 tablet, Rfl: 3    SYNTHROID 200 MCG tablet, TAKE 1 TABLET DAILY, Disp: 90 tablet, Rfl: 2    HUMALOG 100 UNIT/ML injection vial, INJECT PER INSULIN PUMP    MAXIMUM DOSE 100 UNITS PER DAY SUBCUTANEOUSLY, Disp: 90 mL, Rfl: 2    metFORMIN (GLUCOPHAGE) 500 MG tablet, TAKE 1 TABLET TWICE DAILY  WITH MEALS, Disp: 180 tablet, Rfl: 2    albuterol sulfate  (90 Base) MCG/ACT inhaler, INHALE 2 PUFFS EVERY 4 TO 6 HOURS AS NEEDED, Disp: , Rfl: 0    traMADol (ULTRAM ER) 100 MG TB24 extended release tablet, TAKE 1 TABLET BY MOUTH EVERY night AS NEEDED FOR PAIN FOR 28 DAYS., Disp: , Rfl: 0    traMADol (ULTRAM) 50 MG tablet, TAKE 1 TABLET BY MOUTH TWICE DAILY AS NEEDED FOR PAIN, Disp: , Rfl: 0    mometasone (ELOCON) 0.1 % cream, Apply topically daily. , Disp: 45 g, Rfl: 2    Calcium Carb-Cholecalciferol (CALCIUM 600 + D) 600-200 MG-UNIT TABS, one a day, Disp: , Rfl:     LYRICA 150 MG capsule, Place 150 mg into the right eye 2 times daily. , Disp: , Rfl: 0    Pyridoxine HCl (VITAMIN B-6) 100 MG tablet, Take 100 mg by mouth daily, Disp: , Rfl:     insulin glargine (LANTUS) 100 UNIT/ML injection vial, 40 units at bedtime (Patient taking differently: Indications: Pt to use as backup if insulin pump fails 40 units at bedtime), Disp: 40 mL, Rfl: 3    Insulin Syringe-Needle U-100 ([employer] INSULIN SYRINGE) 31G X 5/16\" 0.5 ML MISC, 1 each by Does not apply route daily, Disp: 300 each, Rfl: 3    INSULIN INFUSION PUMP, by Does not apply route.  , Disp: , Rfl:     ALPRAZolam (XANAX) 0.5 MG tablet, TAKE 1 TABLET BY MOUTH THREE TIMES DAILY AS NEEDED for sleep or anxiety for up to 30 days, Disp: 90 tablet, Rfl: 1      Review of Systems   Eyes: Negative. Cardiovascular: Negative.     All other systems reviewed

## 2020-10-20 RX ORDER — LEVOTHYROXINE SODIUM 0.12 MG/1
TABLET ORAL
Qty: 180 TABLET | Refills: 3 | Status: SHIPPED | OUTPATIENT
Start: 2020-10-20 | End: 2021-11-12

## 2020-11-17 ENCOUNTER — OFFICE VISIT (OUTPATIENT)
Dept: FAMILY MEDICINE CLINIC | Age: 50
End: 2020-11-17
Payer: COMMERCIAL

## 2020-11-17 VITALS
WEIGHT: 293 LBS | SYSTOLIC BLOOD PRESSURE: 138 MMHG | TEMPERATURE: 98.7 F | HEART RATE: 121 BPM | BODY MASS INDEX: 45.99 KG/M2 | DIASTOLIC BLOOD PRESSURE: 74 MMHG | OXYGEN SATURATION: 91 % | HEIGHT: 67 IN

## 2020-11-17 DIAGNOSIS — F41.9 ANXIETY: ICD-10-CM

## 2020-11-17 LAB
AMPHETAMINE SCREEN, URINE: NORMAL
BARBITURATE SCREEN URINE: NORMAL
BENZODIAZEPINE SCREEN, URINE: NORMAL
CANNABINOID SCREEN URINE: NORMAL
COCAINE METABOLITE SCREEN URINE: NORMAL
Lab: NORMAL
METHADONE SCREEN, URINE: NORMAL
OPIATE SCREEN URINE: NORMAL
OXYCODONE URINE: NORMAL
PHENCYCLIDINE SCREEN URINE: NORMAL
PROPOXYPHENE SCREEN: NORMAL

## 2020-11-17 PROCEDURE — 99214 OFFICE O/P EST MOD 30 MIN: CPT | Performed by: FAMILY MEDICINE

## 2020-11-17 PROCEDURE — 3052F HG A1C>EQUAL 8.0%<EQUAL 9.0%: CPT | Performed by: FAMILY MEDICINE

## 2020-11-17 RX ORDER — ALPRAZOLAM 0.5 MG/1
TABLET ORAL
Qty: 90 TABLET | Refills: 2 | Status: SHIPPED | OUTPATIENT
Start: 2020-11-17 | End: 2021-05-28 | Stop reason: SDUPTHER

## 2020-11-17 SDOH — ECONOMIC STABILITY: INCOME INSECURITY: HOW HARD IS IT FOR YOU TO PAY FOR THE VERY BASICS LIKE FOOD, HOUSING, MEDICAL CARE, AND HEATING?: NOT HARD AT ALL

## 2020-11-17 SDOH — ECONOMIC STABILITY: FOOD INSECURITY: WITHIN THE PAST 12 MONTHS, YOU WORRIED THAT YOUR FOOD WOULD RUN OUT BEFORE YOU GOT MONEY TO BUY MORE.: NEVER TRUE

## 2020-11-17 SDOH — ECONOMIC STABILITY: FOOD INSECURITY: WITHIN THE PAST 12 MONTHS, THE FOOD YOU BOUGHT JUST DIDN'T LAST AND YOU DIDN'T HAVE MONEY TO GET MORE.: NEVER TRUE

## 2020-11-17 SDOH — ECONOMIC STABILITY: TRANSPORTATION INSECURITY
IN THE PAST 12 MONTHS, HAS LACK OF TRANSPORTATION KEPT YOU FROM MEETINGS, WORK, OR FROM GETTING THINGS NEEDED FOR DAILY LIVING?: NO

## 2020-11-17 SDOH — ECONOMIC STABILITY: TRANSPORTATION INSECURITY
IN THE PAST 12 MONTHS, HAS THE LACK OF TRANSPORTATION KEPT YOU FROM MEDICAL APPOINTMENTS OR FROM GETTING MEDICATIONS?: NO

## 2020-11-17 ASSESSMENT — ENCOUNTER SYMPTOMS
GASTROINTESTINAL NEGATIVE: 1
CHEST TIGHTNESS: 0
COUGH: 0
EYES NEGATIVE: 1
RHINORRHEA: 0
RESPIRATORY NEGATIVE: 1

## 2020-11-17 NOTE — PROGRESS NOTES
Patient is seen in follow up for   Chief Complaint   Patient presents with    Diabetes     Last A1c 8/6, was 8.8 - Checking her BS at home, today was 123. Typically around this reading. - She goes Thursday to get labs done before seeing Dr. Alfonso Lpoez next Monday.  Hypertension     Does not check her BP at home    Anxiety    Depression     Hypertension   This is a chronic problem. The current episode started more than 1 year ago. The problem is unchanged. The problem is controlled. There are no associated agents to hypertension. Risk factors for coronary artery disease include diabetes mellitus. Past treatments include ACE inhibitors. The current treatment provides moderate improvement. There are no compliance problems.         Past Medical History:   Diagnosis Date    Arthritis     right knee and ankle    Pilonidal cyst     Thyroid disease     hypothyroid    Type 1 diabetes ([address]      Patient Active Problem List    Diagnosis Date Noted    Brow ptosis 01/22/2016    Long-term insulin use ([address] 01/22/2016    Nuclear sclerotic cataract of both eyes 01/22/2016    Background retinopathy due to secondary diabetes ([address] 10/16/2014    Age related cataract 10/16/2014    Type 1 diabetes mellitus ([address] 07/02/2014    Obesity 08/18/2013    Panic attacks 11/29/2012    Pilonidal cyst     Vitamin D deficiency 03/28/2011    Chronic lymphocytic thyroiditis 11/05/2002    Acquired hypothyroidism 11/05/2002     Past Surgical History:   Procedure Laterality Date    APPENDECTOMY      ARTHROSCOPY / ARTHROTOMY KNEE Right 11/8/2016    RIGHT KNEE ARTHROSCOPY / MEDIAL MENISCUS TEAR / SUPINE  performed by Sandra Haley MD at [address] [place] [address]  97-02     Family History   Problem Relation Age of Onset    Arthritis Mother     Heart Disease Father     High Blood Pressure Father     No Known Problems Daughter      Social History     Socioeconomic History    Marital status:      Spouse name: None    Number of children: None    Years of education: None    Highest education level: None   Occupational History    None   Social Needs    Financial resource strain: Not hard at all   [place]ell[place] insecurity     Worry: Never true     Inability: Never true   [employer] needs     Medical: No     Non-medical: No   Tobacco Use    Smoking status: Never Smoker    Smokeless tobacco: Never Used   Substance and Sexual Activity    Alcohol use: No    Drug use: No    Sexual activity: Yes   Lifestyle    Physical activity     Days per week: None     Minutes per session: None    Stress: None   Relationships    Social connections     Talks on phone: None     Gets together: None     Attends Yazidism service: None     Active member of club or organization: None     Attends meetings of clubs or organizations: None     Relationship status: None    Intimate partner violence     Fear of current or ex partner: None     Emotionally abused: None     Physically abused: None     Forced sexual activity: None   Other Topics Concern    None   Social History Narrative    None     Current Outpatient Medications   Medication Sig Dispense Refill    ALPRAZolam (XANAX) 0.5 MG tablet TAKE 1 TABLET BY MOUTH THREE TIMES DAILY AS NEEDED for sleep or anxiety for up to 30 days 90 tablet 2    levothyroxine (SYNTHROID) 125 MCG tablet 2 po daily 180 tablet 03    traZODone (DESYREL) 50 MG tablet TAKE 3 TABLETS AT BEDTIME 270 tablet 3    lisinopril (PRINIVIL;ZESTRIL) 10 MG tablet TAKE 1 TABLET DAILY 90 tablet 3    pantoprazole (PROTONIX) 40 MG tablet TAKE 1 TABLET DAILY 90 tablet 3    QUEtiapine (SEROQUEL) 50 MG tablet TAKE 1 TABLET EVERY MORNINGAND TAKE 2 TABLETS EVERY   EVENING 270 tablet 3    HUMALOG 100 UNIT/ML injection vial INJECT PER INSULIN PUMP    MAXIMUM DOSE 100 UNITS PER DAY SUBCUTANEOUSLY 90 mL 2    metFORMIN (GLUCOPHAGE) 500 MG tablet TAKE 1 TABLET TWICE DAILY  WITH MEALS 180 tablet 2    albuterol sulfate  (90 Base) MCG/ACT inhaler INHALE 2 PUFFS EVERY 4 TO 6 HOURS AS NEEDED  0    traMADol (ULTRAM ER) 100 MG TB24 extended release tablet TAKE 1 TABLET BY MOUTH EVERY night AS NEEDED FOR PAIN FOR 28 DAYS.  0    traMADol (ULTRAM) 50 MG tablet TAKE 1 TABLET BY MOUTH TWICE DAILY AS NEEDED FOR PAIN  0    mometasone (ELOCON) 0.1 % cream Apply topically daily. 45 g 2    Calcium Carb-Cholecalciferol (CALCIUM 600 + D) 600-200 MG-UNIT TABS one a day      LYRICA 150 MG capsule Place 150 mg into the right eye 2 times daily. 0    insulin glargine (LANTUS) 100 UNIT/ML injection vial 40 units at bedtime (Patient taking differently: Indications: Pt to use as backup if insulin pump fails 40 units at bedtime) 40 mL 3    Insulin Syringe-Needle U-100 ([employer] INSULIN SYRINGE) 31G X 5/16\" 0.5 ML MISC 1 each by Does not apply route daily 300 each 3    INSULIN INFUSION PUMP by Does not apply route. No current facility-administered medications for this visit.       Current Outpatient Medications on File Prior to Visit   Medication Sig Dispense Refill    levothyroxine (SYNTHROID) 125 MCG tablet 2 po daily 180 tablet 03    traZODone (DESYREL) 50 MG tablet TAKE 3 TABLETS AT BEDTIME 270 tablet 3    lisinopril (PRINIVIL;ZESTRIL) 10 MG tablet TAKE 1 TABLET DAILY 90 tablet 3    pantoprazole (PROTONIX) 40 MG tablet TAKE 1 TABLET DAILY 90 tablet 3    QUEtiapine (SEROQUEL) 50 MG tablet TAKE 1 TABLET EVERY MORNINGAND TAKE 2 TABLETS EVERY   EVENING 270 tablet 3    HUMALOG 100 UNIT/ML injection vial INJECT PER INSULIN PUMP    MAXIMUM DOSE 100 UNITS PER DAY SUBCUTANEOUSLY 90 mL 2    metFORMIN (GLUCOPHAGE) 500 MG tablet TAKE 1 TABLET TWICE DAILY  WITH MEALS 180 tablet 2    albuterol sulfate  (90 Base) MCG/ACT inhaler INHALE 2 PUFFS EVERY 4 TO 6 HOURS AS NEEDED  0    traMADol (ULTRAM ER) 100 MG TB24 extended release tablet TAKE 1 TABLET BY MOUTH EVERY night AS NEEDED FOR PAIN FOR 28 DAYS.  0    traMADol (ULTRAM) 50 MG tablet TAKE 1 TABLET BY MOUTH TWICE DAILY AS NEEDED FOR PAIN  0    mometasone (ELOCON) 0.1 % cream Apply topically daily. 45 g 2    Calcium Carb-Cholecalciferol (CALCIUM 600 + D) 600-200 MG-UNIT TABS one a day      LYRICA 150 MG capsule Place 150 mg into the right eye 2 times daily. 0    insulin glargine (LANTUS) 100 UNIT/ML injection vial 40 units at bedtime (Patient taking differently: Indications: Pt to use as backup if insulin pump fails 40 units at bedtime) 40 mL 3    Insulin Syringe-Needle U-100 ([employer] INSULIN SYRINGE) 31G X 5/16\" 0.5 ML MISC 1 each by Does not apply route daily 300 each 3    INSULIN INFUSION PUMP by Does not apply route. No current facility-administered medications on file prior to visit. No Known Allergies  Health Maintenance   Topic Date Due    Pneumococcal 0-64 years Vaccine (1 of 1 - PPSV23) 11/01/1976    DTaP/Tdap/Td vaccine (1 - Tdap) 11/01/1989    Cervical cancer screen  11/01/1991    Hepatitis B vaccine (3 of 3 - Risk 3-dose series) 06/28/2011    Diabetic retinal exam  06/22/2019    Lipid screen  09/25/2019    Shingles Vaccine (1 of 2) 11/01/2020    Breast cancer screen  11/01/2020    Colon cancer screen colonoscopy  11/01/2020    Flu vaccine (1) 11/17/2021 (Originally 9/1/2020)    HIV screen  11/21/2028 (Originally 11/1/1985)    Diabetic foot exam  03/04/2021    A1C test (Diabetic or Prediabetic)  08/06/2021    Diabetic microalbuminuria test  08/06/2021    TSH testing  08/06/2021    Potassium monitoring  08/06/2021    Creatinine monitoring  08/06/2021    Hepatitis A vaccine  Aged Out    Hib vaccine  Aged Out    Meningococcal (ACWY) vaccine  Aged Out       Review of Systems     Review of Systems   Constitutional: Negative for activity change, appetite change, fatigue and fever. HENT: Negative for congestion and rhinorrhea. Eyes: Negative. Respiratory: Negative. Negative for cough and chest tightness. Cardiovascular: Negative. Gastrointestinal: Negative. hypothyroidism     5. Anxiety  Urine Drug Screen     Problem List     Panic attacks    Relevant Medications    traZODone (DESYREL) 50 MG tablet    ALPRAZolam (XANAX) 0.5 MG tablet    Type 1 diabetes mellitus (HCC) - Primary    Relevant Medications    insulin glargine (LANTUS) 100 UNIT/ML injection vial    HUMALOG 100 UNIT/ML injection vial    metFORMIN (GLUCOPHAGE) 500 MG tablet    Acquired hypothyroidism    Relevant Medications    levothyroxine (SYNTHROID) 125 MCG tablet          Plan  Orders Placed This Encounter   Procedures    EKTA DIGITAL SCREEN W OR WO CAD BILATERAL    Urine Drug Screen     Standing Status:   Future     Standing Expiration Date:   11/17/2021     Orders Placed This Encounter   Medications    ALPRAZolam (XANAX) 0.5 MG tablet     Sig: TAKE 1 TABLET BY MOUTH THREE TIMES DAILY AS NEEDED for sleep or anxiety for up to 30 days     Dispense:  90 tablet     Refill:  2     No follow-ups on file.   Makayla Eastman MD

## 2021-01-08 DIAGNOSIS — E10.9 TYPE 1 DIABETES MELLITUS WITHOUT COMPLICATION (HCC): ICD-10-CM

## 2021-01-08 DIAGNOSIS — E03.9 ACQUIRED HYPOTHYROIDISM: ICD-10-CM

## 2021-01-08 LAB
ANION GAP SERPL CALCULATED.3IONS-SCNC: 12 MEQ/L (ref 9–15)
BUN BLDV-MCNC: 13 MG/DL (ref 6–20)
CALCIUM SERPL-MCNC: 9.2 MG/DL (ref 8.5–9.9)
CHLORIDE BLD-SCNC: 103 MEQ/L (ref 95–107)
CHOLESTEROL, TOTAL: 173 MG/DL (ref 0–199)
CO2: 27 MEQ/L (ref 20–31)
CREAT SERPL-MCNC: 1.08 MG/DL (ref 0.5–0.9)
GFR AFRICAN AMERICAN: >60
GFR NON-AFRICAN AMERICAN: 53.7
GLUCOSE BLD-MCNC: 120 MG/DL (ref 70–99)
HBA1C MFR BLD: 7.8 % (ref 4.8–5.9)
HDLC SERPL-MCNC: 63 MG/DL (ref 40–59)
LDL CHOLESTEROL CALCULATED: 92 MG/DL (ref 0–129)
POTASSIUM SERPL-SCNC: 4.5 MEQ/L (ref 3.4–4.9)
SODIUM BLD-SCNC: 142 MEQ/L (ref 135–144)
T4 FREE: 1.53 NG/DL (ref 0.84–1.68)
TRIGL SERPL-MCNC: 89 MG/DL (ref 0–150)
TSH SERPL DL<=0.05 MIU/L-ACNC: 0.86 UIU/ML (ref 0.44–3.86)

## 2021-01-13 ENCOUNTER — OFFICE VISIT (OUTPATIENT)
Dept: ENDOCRINOLOGY | Age: 51
End: 2021-01-13
Payer: COMMERCIAL

## 2021-01-13 VITALS
OXYGEN SATURATION: 95 % | DIASTOLIC BLOOD PRESSURE: 79 MMHG | BODY MASS INDEX: 45.99 KG/M2 | SYSTOLIC BLOOD PRESSURE: 139 MMHG | WEIGHT: 293 LBS | HEIGHT: 67 IN | HEART RATE: 109 BPM

## 2021-01-13 DIAGNOSIS — E10.9 TYPE 1 DIABETES MELLITUS WITHOUT COMPLICATION (HCC): Primary | ICD-10-CM

## 2021-01-13 DIAGNOSIS — E03.9 ACQUIRED HYPOTHYROIDISM: ICD-10-CM

## 2021-01-13 PROCEDURE — 99213 OFFICE O/P EST LOW 20 MIN: CPT | Performed by: INTERNAL MEDICINE

## 2021-01-13 PROCEDURE — 3051F HG A1C>EQUAL 7.0%<8.0%: CPT | Performed by: INTERNAL MEDICINE

## 2021-01-13 ASSESSMENT — ENCOUNTER SYMPTOMS: BACK PAIN: 1

## 2021-01-13 NOTE — PROGRESS NOTES
Subjective:      Patient ID: Sangeeta Ornelas is a 48 y.o. female. Follow-up on type 1 diabetes hypothyroidism lab review patient is on a pump pump setting has not changed did not download the pump today  A1c is improved from 8.8-7.8  Diabetes  She presents for her follow-up diabetic visit. She has type 1 diabetes mellitus. There are no hypoglycemic complications. Symptoms are improving. Diabetic complications include retinopathy. Risk factors for coronary artery disease include obesity. Current diabetic treatment includes insulin injections. Her overall blood glucose range is 180-200 mg/dl. (Lab Results       Component                Value               Date                       LABA1C                   7.8 (H)             01/08/2021            ) An ACE inhibitor/angiotensin II receptor blocker is being taken. Results for Tr López (MRN [id number]) as of 1/18/2021 07:25   Ref.  Range 1/8/2021 10:14   Sodium Latest Ref Range: 135 - 144 mEq/L 142   Potassium Latest Ref Range: 3.4 - 4.9 mEq/L 4.5   Chloride Latest Ref Range: 95 - 107 mEq/L 103   CO2 Latest Ref Range: 20 - 31 mEq/L 27   BUN Latest Ref Range: 6 - 20 mg/dL 13   Creatinine Latest Ref Range: 0.50 - 0.90 mg/dL 1.08 (H)   Anion Gap Latest Ref Range: 9 - 15 mEq/L 12   GFR Non- Latest Ref Range: >60  53.7 (L)   GFR  Latest Ref Range: >60  >60.0   Glucose Latest Ref Range: 70 - 99 mg/dL 120 (H)   Calcium Latest Ref Range: 8.5 - 9.9 mg/dL 9.2   Cholesterol, Total Latest Ref Range: 0 - 199 mg/dL 173   HDL Cholesterol Latest Ref Range: 40 - 59 mg/dL 63 (H)   LDL Calculated Latest Ref Range: 0 - 129 mg/dL 92   Triglycerides Latest Ref Range: 0 - 150 mg/dL 89   Hemoglobin A1C Latest Ref Range: 4.8 - 5.9 % 7.8 (H)   TSH Latest Ref Range: 0.440 - 3.860 uIU/mL 0.861   T4 Free Latest Ref Range: 0.84 - 1.68 ng/dL 1.53       Patient Active Problem List   Diagnosis    Pilonidal cyst    Panic attacks    Obesity  Type 1 diabetes mellitus (HCC)    Background retinopathy due to secondary diabetes ([facility]    Chronic lymphocytic thyroiditis    Age related cataract    Acquired hypothyroidism    Vitamin D deficiency    Brow ptosis    Long-term insulin use (HCC)    Nuclear sclerotic cataract of both eyes     No Known Allergies      Current Outpatient Medications:     levothyroxine (SYNTHROID) 125 MCG tablet, 2 po daily, Disp: 180 tablet, Rfl: 03    traZODone (DESYREL) 50 MG tablet, TAKE 3 TABLETS AT BEDTIME, Disp: 270 tablet, Rfl: 3    lisinopril (PRINIVIL;ZESTRIL) 10 MG tablet, TAKE 1 TABLET DAILY, Disp: 90 tablet, Rfl: 3    pantoprazole (PROTONIX) 40 MG tablet, TAKE 1 TABLET DAILY, Disp: 90 tablet, Rfl: 3    QUEtiapine (SEROQUEL) 50 MG tablet, TAKE 1 TABLET EVERY MORNINGAND TAKE 2 TABLETS EVERY   EVENING, Disp: 270 tablet, Rfl: 3    HUMALOG 100 UNIT/ML injection vial, INJECT PER INSULIN PUMP    MAXIMUM DOSE 100 UNITS PER DAY SUBCUTANEOUSLY, Disp: 90 mL, Rfl: 2    metFORMIN (GLUCOPHAGE) 500 MG tablet, TAKE 1 TABLET TWICE DAILY  WITH MEALS, Disp: 180 tablet, Rfl: 2    albuterol sulfate  (90 Base) MCG/ACT inhaler, INHALE 2 PUFFS EVERY 4 TO 6 HOURS AS NEEDED, Disp: , Rfl: 0    traMADol (ULTRAM ER) 100 MG TB24 extended release tablet, TAKE 1 TABLET BY MOUTH EVERY night AS NEEDED FOR PAIN FOR 28 DAYS., Disp: , Rfl: 0    traMADol (ULTRAM) 50 MG tablet, TAKE 1 TABLET BY MOUTH TWICE DAILY AS NEEDED FOR PAIN, Disp: , Rfl: 0    mometasone (ELOCON) 0.1 % cream, Apply topically daily. , Disp: 45 g, Rfl: 2    Calcium Carb-Cholecalciferol (CALCIUM 600 + D) 600-200 MG-UNIT TABS, one a day, Disp: , Rfl:     LYRICA 150 MG capsule, Place 150 mg into the right eye 2 times daily. , Disp: , Rfl: 0    insulin glargine (LANTUS) 100 UNIT/ML injection vial, 40 units at bedtime (Patient taking differently: Indications: Pt to use as backup if insulin pump fails 40 units at bedtime), Disp: 40 mL, Rfl: 3   Insulin Syringe-Needle U-100 ([employer] INSULIN SYRINGE) 31G X 5/16\" 0.5 ML MISC, 1 each by Does not apply route daily, Disp: 300 each, Rfl: 3    INSULIN INFUSION PUMP, by Does not apply route.  , Disp: , Rfl:     ALPRAZolam (XANAX) 0.5 MG tablet, TAKE 1 TABLET BY MOUTH THREE TIMES DAILY AS NEEDED for sleep or anxiety for up to 30 days, Disp: 90 tablet, Rfl: 2      Review of Systems   Endocrine: Negative. Musculoskeletal: Positive for back pain. Psychiatric/Behavioral: Positive for dysphoric mood. All other systems reviewed and are negative. Vitals:    01/13/21 0943   BP: 139/79   Pulse: 109   SpO2: 95%   Weight: (!) 357 lb (161.9 kg)   Height: 5' 7\" (1.702 m)       Objective:   Physical Exam  Vitals signs reviewed. Constitutional:       Appearance: Normal appearance. She is obese. HENT:      Head: Normocephalic and atraumatic. Right Ear: External ear normal.      Left Ear: External ear normal.      Nose: Nose normal.   Neck:      Musculoskeletal: Normal range of motion and neck supple. Cardiovascular:      Rate and Rhythm: Tachycardia present. Pulmonary:      Effort: Pulmonary effort is normal.   Musculoskeletal: Normal range of motion. Neurological:      General: No focal deficit present. Mental Status: She is alert and oriented to person, place, and time. Assessment:       Diagnosis Orders   1. Type 1 diabetes mellitus without complication (HCC)  T4, Free    TSH with Reflex   2.  Acquired hypothyroidism             Plan:      Orders Placed This Encounter   Procedures    T4, Free     Standing Status:   Future     Standing Expiration Date:   1/13/2022    TSH with Reflex     Standing Status:   Future     Standing Expiration Date:   1/13/2022    Basic Metabolic Panel     Standing Status:   Future     Standing Expiration Date:   1/13/2022    Hemoglobin A1C     Standing Status:   Future     Standing Expiration Date:   1/13/2022    Microalbumin / Creatinine Urine Ratio Standing Status:   Future     Standing Expiration Date:   1/13/2022     Basal rate 12 AM 1.5 units/h 8 AM 1.6 units/h 12 PM 1.7 units/h and 8 PM 1.6 units/h  Carb ratio 12 AM 7  Sensitivity was 10    Continue current pump setting continue current dose of Synthroid patient to follow-up in 3 to 6 months time        Marck Travis MD

## 2021-02-03 ENCOUNTER — TELEPHONE (OUTPATIENT)
Dept: ENDOCRINOLOGY | Age: 51
End: 2021-02-03

## 2021-02-03 NOTE — TELEPHONE ENCOUNTER
Patient is calling because the ophthmalogist  that she was seeing has retired. She is wondering if you have anyone that you would recommend that is good at treating diabetic patients? Please advise.

## 2021-04-12 ENCOUNTER — HOSPITAL ENCOUNTER (INPATIENT)
Age: 51
LOS: 8 days | Discharge: HOME | DRG: 177 | End: 2021-04-20
Attending: INTERNAL MEDICINE | Admitting: INTERNAL MEDICINE
Payer: COMMERCIAL

## 2021-04-12 ENCOUNTER — APPOINTMENT (OUTPATIENT)
Dept: CT IMAGING | Age: 51
DRG: 177 | End: 2021-04-12
Payer: COMMERCIAL

## 2021-04-12 ENCOUNTER — APPOINTMENT (OUTPATIENT)
Dept: GENERAL RADIOLOGY | Age: 51
DRG: 177 | End: 2021-04-12
Payer: COMMERCIAL

## 2021-04-12 DIAGNOSIS — U07.1 COVID-19: Primary | ICD-10-CM

## 2021-04-12 DIAGNOSIS — J18.9 PNEUMONIA DUE TO ORGANISM: ICD-10-CM

## 2021-04-12 DIAGNOSIS — J96.01 ACUTE RESPIRATORY FAILURE WITH HYPOXIA (HCC): ICD-10-CM

## 2021-04-12 DIAGNOSIS — R09.02 HYPOXIA: ICD-10-CM

## 2021-04-12 PROBLEM — J96.90 RESPIRATORY FAILURE (HCC): Status: ACTIVE | Noted: 2021-04-12

## 2021-04-12 LAB
ALBUMIN SERPL-MCNC: 3.2 G/DL (ref 3.5–4.6)
ALP BLD-CCNC: 132 U/L (ref 40–130)
ALT SERPL-CCNC: 40 U/L (ref 0–33)
ANION GAP SERPL CALCULATED.3IONS-SCNC: 13 MEQ/L (ref 9–15)
ANISOCYTOSIS: ABNORMAL
AST SERPL-CCNC: 65 U/L (ref 0–35)
ATYPICAL LYMPHOCYTE RELATIVE PERCENT: 3 %
BANDED NEUTROPHILS RELATIVE PERCENT: 11 % (ref 5–11)
BASE EXCESS ARTERIAL: -2 (ref -3–3)
BASOPHILS ABSOLUTE: 0 K/UL (ref 0–0.2)
BASOPHILS RELATIVE PERCENT: 0.5 %
BETA-HYDROXYBUTYRATE: 10.4 MG/DL (ref 0.2–2.8)
BILIRUB SERPL-MCNC: 0.4 MG/DL (ref 0.2–0.7)
BUN BLDV-MCNC: 29 MG/DL (ref 6–20)
CALCIUM IONIZED: 1.08 MMOL/L (ref 1.12–1.32)
CALCIUM SERPL-MCNC: 8.3 MG/DL (ref 8.5–9.9)
CHLORIDE BLD-SCNC: 98 MEQ/L (ref 95–107)
CHP ED QC CHECK: YES
CO2: 22 MEQ/L (ref 20–31)
CREAT SERPL-MCNC: 1.38 MG/DL (ref 0.5–0.9)
EKG ATRIAL RATE: 108 BPM
EKG P AXIS: 37 DEGREES
EKG P-R INTERVAL: 154 MS
EKG Q-T INTERVAL: 292 MS
EKG QRS DURATION: 56 MS
EKG QTC CALCULATION (BAZETT): 391 MS
EKG R AXIS: 56 DEGREES
EKG T AXIS: 23 DEGREES
EKG VENTRICULAR RATE: 108 BPM
EOSINOPHILS ABSOLUTE: 0 K/UL (ref 0–0.7)
EOSINOPHILS RELATIVE PERCENT: 0.1 %
GFR AFRICAN AMERICAN: 48
GFR AFRICAN AMERICAN: 48.9
GFR NON-AFRICAN AMERICAN: 40
GFR NON-AFRICAN AMERICAN: 40.4
GLOBULIN: 4 G/DL (ref 2.3–3.5)
GLUCOSE BLD-MCNC: 175 MG/DL
GLUCOSE BLD-MCNC: 175 MG/DL (ref 60–115)
GLUCOSE BLD-MCNC: 177 MG/DL (ref 70–99)
GLUCOSE BLD-MCNC: 191 MG/DL (ref 60–115)
GLUCOSE BLD-MCNC: 207 MG/DL (ref 60–115)
HCG QUALITATIVE: NEGATIVE
HCO3 ARTERIAL: 22.2 MMOL/L (ref 21–29)
HCT VFR BLD CALC: 35.3 % (ref 37–47)
HEMOGLOBIN: 11.3 G/DL (ref 12–16)
HEMOGLOBIN: 13.1 GM/DL (ref 12–16)
HYPOCHROMIA: ABNORMAL
LACTATE: 0.95 MMOL/L (ref 0.4–2)
LACTIC ACID: 1.4 MMOL/L (ref 0.5–2.2)
LYMPHOCYTES ABSOLUTE: 0.8 K/UL (ref 1–4.8)
LYMPHOCYTES RELATIVE PERCENT: 10 %
MCH RBC QN AUTO: 23.4 PG (ref 27–31.3)
MCHC RBC AUTO-ENTMCNC: 32 % (ref 33–37)
MCV RBC AUTO: 73.1 FL (ref 82–100)
METAMYELOCYTES RELATIVE PERCENT: 1 %
MICROCYTES: ABNORMAL
MONOCYTES ABSOLUTE: 0.1 K/UL (ref 0.2–0.8)
MONOCYTES RELATIVE PERCENT: 0.9 %
NEUTROPHILS ABSOLUTE: 5.2 K/UL (ref 1.4–6.5)
NEUTROPHILS RELATIVE PERCENT: 74 %
O2 SAT, ARTERIAL: 97 % (ref 93–100)
PCO2 ARTERIAL: 35 MM HG (ref 35–45)
PDW BLD-RTO: 17.1 % (ref 11.5–14.5)
PERFORMED ON: ABNORMAL
PH ARTERIAL: 7.41 (ref 7.35–7.45)
PLATELET # BLD: 223 K/UL (ref 130–400)
PLATELET SLIDE REVIEW: NORMAL
PO2 ARTERIAL: 85 MM HG (ref 75–108)
POC CHLORIDE: 102 MEQ/L (ref 99–110)
POC CREATININE WHOLE BLOOD: 1.4
POC CREATININE: 1.4 MG/DL (ref 0.6–1.1)
POC HEMATOCRIT: 39 % (ref 36–48)
POC POTASSIUM: 4.4 MEQ/L (ref 3.5–5.1)
POC SAMPLE TYPE: ABNORMAL
POC SODIUM: 134 MEQ/L (ref 136–145)
POTASSIUM SERPL-SCNC: 4.4 MEQ/L (ref 3.4–4.9)
PRO-BNP: 56 PG/ML
PROCALCITONIN: 0.13 NG/ML (ref 0–0.15)
RBC # BLD: 4.82 M/UL (ref 4.2–5.4)
SARS-COV-2, NAAT: DETECTED
SMUDGE CELLS: 1.9
SODIUM BLD-SCNC: 133 MEQ/L (ref 135–144)
T3 TOTAL: 0.67 NG/ML (ref 0.8–2)
T4 FREE: 2.31 NG/DL (ref 0.84–1.68)
TCO2 ARTERIAL: 23 (ref 22–29)
TOTAL PROTEIN: 7.2 G/DL (ref 6.3–8)
TROPONIN: <0.01 NG/ML (ref 0–0.01)
TSH SERPL DL<=0.05 MIU/L-ACNC: 0.21 UIU/ML (ref 0.44–3.86)
WBC # BLD: 6.1 K/UL (ref 4.8–10.8)

## 2021-04-12 PROCEDURE — 71275 CT ANGIOGRAPHY CHEST: CPT

## 2021-04-12 PROCEDURE — XW033E5 INTRODUCTION OF REMDESIVIR ANTI-INFECTIVE INTO PERIPHERAL VEIN, PERCUTANEOUS APPROACH, NEW TECHNOLOGY GROUP 5: ICD-10-PCS | Performed by: INTERNAL MEDICINE

## 2021-04-12 PROCEDURE — 84484 ASSAY OF TROPONIN QUANT: CPT

## 2021-04-12 PROCEDURE — 6360000002 HC RX W HCPCS: Performed by: INTERNAL MEDICINE

## 2021-04-12 PROCEDURE — 93005 ELECTROCARDIOGRAM TRACING: CPT | Performed by: PERSONAL EMERGENCY RESPONSE ATTENDANT

## 2021-04-12 PROCEDURE — 6360000004 HC RX CONTRAST MEDICATION: Performed by: PERSONAL EMERGENCY RESPONSE ATTENDANT

## 2021-04-12 PROCEDURE — 84439 ASSAY OF FREE THYROXINE: CPT

## 2021-04-12 PROCEDURE — 82010 KETONE BODYS QUAN: CPT

## 2021-04-12 PROCEDURE — 84132 ASSAY OF SERUM POTASSIUM: CPT

## 2021-04-12 PROCEDURE — 36600 WITHDRAWAL OF ARTERIAL BLOOD: CPT

## 2021-04-12 PROCEDURE — XW033H5 INTRODUCTION OF TOCILIZUMAB INTO PERIPHERAL VEIN, PERCUTANEOUS APPROACH, NEW TECHNOLOGY GROUP 5: ICD-10-PCS | Performed by: INTERNAL MEDICINE

## 2021-04-12 PROCEDURE — 87635 SARS-COV-2 COVID-19 AMP PRB: CPT

## 2021-04-12 PROCEDURE — 84145 PROCALCITONIN (PCT): CPT

## 2021-04-12 PROCEDURE — 99284 EMERGENCY DEPT VISIT MOD MDM: CPT

## 2021-04-12 PROCEDURE — 82565 ASSAY OF CREATININE: CPT

## 2021-04-12 PROCEDURE — 85025 COMPLETE CBC W/AUTO DIFF WBC: CPT

## 2021-04-12 PROCEDURE — 82435 ASSAY OF BLOOD CHLORIDE: CPT

## 2021-04-12 PROCEDURE — 84703 CHORIONIC GONADOTROPIN ASSAY: CPT

## 2021-04-12 PROCEDURE — 36415 COLL VENOUS BLD VENIPUNCTURE: CPT

## 2021-04-12 PROCEDURE — 2000000000 HC ICU R&B

## 2021-04-12 PROCEDURE — 82330 ASSAY OF CALCIUM: CPT

## 2021-04-12 PROCEDURE — 6360000002 HC RX W HCPCS: Performed by: PERSONAL EMERGENCY RESPONSE ATTENDANT

## 2021-04-12 PROCEDURE — 80053 COMPREHEN METABOLIC PANEL: CPT

## 2021-04-12 PROCEDURE — 84295 ASSAY OF SERUM SODIUM: CPT

## 2021-04-12 PROCEDURE — 6370000000 HC RX 637 (ALT 250 FOR IP): Performed by: PERSONAL EMERGENCY RESPONSE ATTENDANT

## 2021-04-12 PROCEDURE — 94640 AIRWAY INHALATION TREATMENT: CPT

## 2021-04-12 PROCEDURE — 82803 BLOOD GASES ANY COMBINATION: CPT

## 2021-04-12 PROCEDURE — 2700000000 HC OXYGEN THERAPY PER DAY

## 2021-04-12 PROCEDURE — 94761 N-INVAS EAR/PLS OXIMETRY MLT: CPT

## 2021-04-12 PROCEDURE — 83880 ASSAY OF NATRIURETIC PEPTIDE: CPT

## 2021-04-12 PROCEDURE — 71045 X-RAY EXAM CHEST 1 VIEW: CPT

## 2021-04-12 PROCEDURE — 83605 ASSAY OF LACTIC ACID: CPT

## 2021-04-12 PROCEDURE — 84443 ASSAY THYROID STIM HORMONE: CPT

## 2021-04-12 PROCEDURE — 84480 ASSAY TRIIODOTHYRONINE (T3): CPT

## 2021-04-12 PROCEDURE — 85014 HEMATOCRIT: CPT

## 2021-04-12 PROCEDURE — 96374 THER/PROPH/DIAG INJ IV PUSH: CPT

## 2021-04-12 RX ORDER — LEVOTHYROXINE SODIUM 0.07 MG/1
150 TABLET ORAL DAILY
Status: DISCONTINUED | OUTPATIENT
Start: 2021-04-13 | End: 2021-04-20 | Stop reason: HOSPADM

## 2021-04-12 RX ORDER — POLYETHYLENE GLYCOL 3350 17 G/17G
17 POWDER, FOR SOLUTION ORAL DAILY PRN
Status: DISCONTINUED | OUTPATIENT
Start: 2021-04-12 | End: 2021-04-20 | Stop reason: HOSPADM

## 2021-04-12 RX ORDER — ONDANSETRON 2 MG/ML
4 INJECTION INTRAMUSCULAR; INTRAVENOUS EVERY 6 HOURS PRN
Status: DISCONTINUED | OUTPATIENT
Start: 2021-04-12 | End: 2021-04-20 | Stop reason: HOSPADM

## 2021-04-12 RX ORDER — ACETAMINOPHEN 650 MG/1
650 SUPPOSITORY RECTAL EVERY 6 HOURS PRN
Status: DISCONTINUED | OUTPATIENT
Start: 2021-04-12 | End: 2021-04-20 | Stop reason: HOSPADM

## 2021-04-12 RX ORDER — DEXAMETHASONE SODIUM PHOSPHATE 4 MG/ML
6 INJECTION, SOLUTION INTRA-ARTICULAR; INTRALESIONAL; INTRAMUSCULAR; INTRAVENOUS; SOFT TISSUE EVERY 24 HOURS
Status: DISCONTINUED | OUTPATIENT
Start: 2021-04-13 | End: 2021-04-20 | Stop reason: HOSPADM

## 2021-04-12 RX ORDER — IPRATROPIUM BROMIDE AND ALBUTEROL SULFATE 2.5; .5 MG/3ML; MG/3ML
1 SOLUTION RESPIRATORY (INHALATION) CONTINUOUS PRN
Status: DISCONTINUED | OUTPATIENT
Start: 2021-04-12 | End: 2021-04-12

## 2021-04-12 RX ORDER — SODIUM CHLORIDE, SODIUM LACTATE, POTASSIUM CHLORIDE, AND CALCIUM CHLORIDE .6; .31; .03; .02 G/100ML; G/100ML; G/100ML; G/100ML
1000 INJECTION, SOLUTION INTRAVENOUS ONCE
Status: COMPLETED | OUTPATIENT
Start: 2021-04-13 | End: 2021-04-13

## 2021-04-12 RX ORDER — DEXTROSE MONOHYDRATE 25 G/50ML
12.5 INJECTION, SOLUTION INTRAVENOUS PRN
Status: DISCONTINUED | OUTPATIENT
Start: 2021-04-12 | End: 2021-04-20 | Stop reason: HOSPADM

## 2021-04-12 RX ORDER — DEXTROSE MONOHYDRATE 50 MG/ML
100 INJECTION, SOLUTION INTRAVENOUS PRN
Status: DISCONTINUED | OUTPATIENT
Start: 2021-04-12 | End: 2021-04-20 | Stop reason: HOSPADM

## 2021-04-12 RX ORDER — METHYLPREDNISOLONE SODIUM SUCCINATE 125 MG/2ML
125 INJECTION, POWDER, LYOPHILIZED, FOR SOLUTION INTRAMUSCULAR; INTRAVENOUS ONCE
Status: COMPLETED | OUTPATIENT
Start: 2021-04-12 | End: 2021-04-12

## 2021-04-12 RX ORDER — NICOTINE POLACRILEX 4 MG
15 LOZENGE BUCCAL PRN
Status: DISCONTINUED | OUTPATIENT
Start: 2021-04-12 | End: 2021-04-20 | Stop reason: HOSPADM

## 2021-04-12 RX ORDER — QUETIAPINE FUMARATE 50 MG/1
50 TABLET, FILM COATED ORAL 2 TIMES DAILY
Status: DISCONTINUED | OUTPATIENT
Start: 2021-04-13 | End: 2021-04-20 | Stop reason: HOSPADM

## 2021-04-12 RX ORDER — PANTOPRAZOLE SODIUM 40 MG/1
40 TABLET, DELAYED RELEASE ORAL
Status: DISCONTINUED | OUTPATIENT
Start: 2021-04-13 | End: 2021-04-20 | Stop reason: HOSPADM

## 2021-04-12 RX ORDER — ACETAMINOPHEN 325 MG/1
650 TABLET ORAL EVERY 6 HOURS PRN
Status: DISCONTINUED | OUTPATIENT
Start: 2021-04-12 | End: 2021-04-20 | Stop reason: HOSPADM

## 2021-04-12 RX ORDER — OYSTER SHELL CALCIUM WITH VITAMIN D 500; 200 MG/1; [IU]/1
1 TABLET, FILM COATED ORAL DAILY
Status: DISCONTINUED | OUTPATIENT
Start: 2021-04-13 | End: 2021-04-20 | Stop reason: HOSPADM

## 2021-04-12 RX ORDER — PROMETHAZINE HYDROCHLORIDE 12.5 MG/1
12.5 TABLET ORAL EVERY 6 HOURS PRN
Status: DISCONTINUED | OUTPATIENT
Start: 2021-04-12 | End: 2021-04-20 | Stop reason: HOSPADM

## 2021-04-12 RX ORDER — INSULIN GLARGINE 100 [IU]/ML
40 INJECTION, SOLUTION SUBCUTANEOUS NIGHTLY
Status: DISCONTINUED | OUTPATIENT
Start: 2021-04-13 | End: 2021-04-14

## 2021-04-12 RX ADMIN — IPRATROPIUM BROMIDE AND ALBUTEROL SULFATE 1 AMPULE: .5; 3 SOLUTION RESPIRATORY (INHALATION) at 19:02

## 2021-04-12 RX ADMIN — METHYLPREDNISOLONE SODIUM SUCCINATE 125 MG: 125 INJECTION, POWDER, FOR SOLUTION INTRAMUSCULAR; INTRAVENOUS at 20:06

## 2021-04-12 RX ADMIN — IOPAMIDOL 100 ML: 612 INJECTION, SOLUTION INTRAVENOUS at 19:57

## 2021-04-12 RX ADMIN — ENOXAPARIN SODIUM 30 MG: 30 INJECTION SUBCUTANEOUS at 23:17

## 2021-04-12 ASSESSMENT — ENCOUNTER SYMPTOMS
DIARRHEA: 0
VOMITING: 0
NAUSEA: 0
SHORTNESS OF BREATH: 1
WHEEZING: 1
RHINORRHEA: 0
SORE THROAT: 0
BLOOD IN STOOL: 0
ABDOMINAL PAIN: 0
COLOR CHANGE: 0
COUGH: 1

## 2021-04-12 NOTE — ED PROVIDER NOTES
[facility] ED  eMERGENCY dEPARTMENT eNCOUnter      Pt Name: Deja Farmer  MRN: [id number]  [place] 1970  Date of evaluation: 4/12/2021  Provider: TOMY Astorga      HISTORY OF PRESENT ILLNESS    Deja Farmer is a 48 y.o. female with PMHx of thyroid disease, type 1 diabetes, obesity, panic attacks presents to the emergency department with shortness of breath. Pts daughter tested positive for COVID 2 weeks ago. Patient got her Covid vaccine 4/9 and that day started with gradual onset of body aches, moist cough, shortness of breath, wheezing. Temperature of 99 at home. Decreased appetite. She is using inhaler with minimal relief. She denies chest pain abdominal pain, nausea, vomiting, diarrhea, dysuria, leg swelling. HPI    Nursing Notes were reviewed. REVIEW OF SYSTEMS       Review of Systems   Constitutional: Positive for appetite change. Negative for chills and fever. HENT: Negative for congestion, rhinorrhea and sore throat. Respiratory: Positive for cough, shortness of breath and wheezing. Cardiovascular: Negative for chest pain. Gastrointestinal: Negative for abdominal pain, blood in stool, diarrhea, nausea and vomiting. Genitourinary: Negative for difficulty urinating. Musculoskeletal: Negative for neck stiffness. Skin: Negative for color change and rash. Neurological: Negative for dizziness, syncope, weakness, light-headedness, numbness and headaches. All other systems reviewed and are negative.             PAST MEDICAL HISTORY     Past Medical History:   Diagnosis Date    Arthritis     right knee and ankle    Pilonidal cyst     Thyroid disease     hypothyroid    Type 1 diabetes ([facility] [address]          SURGICAL HISTORY       Past Surgical History:   Procedure Laterality Date    APPENDECTOMY      ARTHROSCOPY / ARTHROTOMY KNEE Right 11/8/2016    RIGHT KNEE ARTHROSCOPY / MEDIAL MENISCUS TEAR / SUPINE  performed by Grace Martinez MD at [address] [place] [address] SECTION  97-02         CURRENT MEDICATIONS       Previous Medications    ALBUTEROL SULFATE  (90 BASE) MCG/ACT INHALER    INHALE 2 PUFFS EVERY 4 TO 6 HOURS AS NEEDED    ALPRAZOLAM (XANAX) 0.5 MG TABLET    TAKE 1 TABLET BY MOUTH THREE TIMES DAILY AS NEEDED for sleep or anxiety for up to 30 days    CALCIUM CARB-CHOLECALCIFEROL (CALCIUM 600 + D) 600-200 MG-UNIT TABS    one a day    HUMALOG 100 UNIT/ML INJECTION VIAL    INJECT PER INSULIN PUMP    MAXIMUM DOSE 100 UNITS PER DAY SUBCUTANEOUSLY    INSULIN GLARGINE (LANTUS) 100 UNIT/ML INJECTION VIAL    40 units at bedtime    INSULIN INFUSION PUMP    by Does not apply route. INSULIN SYRINGE-NEEDLE U-100 (ProcyrionOGER INSULIN SYRINGE) 31G X 5/16\" 0.5 ML MISC    1 each by Does not apply route daily    LEVOTHYROXINE (SYNTHROID) 125 MCG TABLET    2 po daily    LISINOPRIL (PRINIVIL;ZESTRIL) 10 MG TABLET    TAKE 1 TABLET DAILY    LYRICA 150 MG CAPSULE    Place 150 mg into the right eye 2 times daily. METFORMIN (GLUCOPHAGE) 500 MG TABLET    TAKE 1 TABLET TWICE DAILY  WITH MEALS    MOMETASONE (ELOCON) 0.1 % CREAM    Apply topically daily. PANTOPRAZOLE (PROTONIX) 40 MG TABLET    TAKE 1 TABLET DAILY    QUETIAPINE (SEROQUEL) 50 MG TABLET    TAKE 1 TABLET EVERY MORNINGAND TAKE 2 TABLETS EVERY   EVENING    TRAMADOL (ULTRAM ER) 100 MG TB24 EXTENDED RELEASE TABLET    TAKE 1 TABLET BY MOUTH EVERY night AS NEEDED FOR PAIN FOR 28 DAYS. TRAMADOL (ULTRAM) 50 MG TABLET    TAKE 1 TABLET BY MOUTH TWICE DAILY AS NEEDED FOR PAIN    TRAZODONE (DESYREL) 50 MG TABLET    TAKE 3 TABLETS AT BEDTIME       ALLERGIES     Patient has no known allergies.     FAMILY HISTORY       Family History   Problem Relation Age of Onset    Arthritis Mother     Heart Disease Father     High Blood Pressure Father     No Known Problems Daughter           SOCIAL HISTORY       Social History     Socioeconomic History    Marital status:      Spouse name: None    Number of children: None    Years of education: None    Highest education level: None   Occupational History    None   Social Needs    Financial resource strain: Not hard at all   Oswaldo-Kervin insecurity     Worry: Never true     Inability: Never true   [employer] needs     Medical: No     Non-medical: No   Tobacco Use    Smoking status: Never Smoker    Smokeless tobacco: Never Used   Substance and Sexual Activity    Alcohol use: No    Drug use: No    Sexual activity: Yes   Lifestyle    Physical activity     Days per week: None     Minutes per session: None    Stress: None   Relationships    Social connections     Talks on phone: None     Gets together: None     Attends [employer] service: None     Active member of club or organization: None     Attends meetings of clubs or organizations: None     Relationship status: None    Intimate partner violence     Fear of current or ex partner: None     Emotionally abused: None     Physically abused: None     Forced sexual activity: None   Other Topics Concern    None   Social History Narrative    None         PHYSICAL EXAM         ED Triage Vitals   BP Temp Temp Source Pulse Resp SpO2 Height Weight   04/12/21 1835 04/12/21 1842 04/12/21 1842 04/12/21 1835 04/12/21 1835 04/12/21 1835 04/12/21 1835 04/12/21 1835   (!) 158/87 99.4 °F (37.4 °C) Oral 110 28 95 % 5' 6\" (1.676 m) 260 lb (117.9 kg)       Physical Exam  Constitutional:       Appearance: She is well-developed. HENT:      Head: Normocephalic and atraumatic. Eyes:      Conjunctiva/sclera: Conjunctivae normal.      Pupils: Pupils are equal, round, and reactive to light. Neck:      Musculoskeletal: Normal range of motion and neck supple. Trachea: No tracheal deviation. Cardiovascular:      Heart sounds: Normal heart sounds. Pulmonary:      Effort: Pulmonary effort is normal. No respiratory distress. Breath sounds: Normal breath sounds. No stridor.       Comments: Lungs diminished throughout, tachypnea noted, does appear fatigued in the room with eyes closed, answering questions appropriately, no lethargy or drowsiness  Abdominal:      General: Bowel sounds are normal. There is no distension. Palpations: Abdomen is soft. There is no mass. Tenderness: There is no abdominal tenderness. There is no guarding or rebound. Musculoskeletal: Normal range of motion. Skin:     General: Skin is warm and dry. Capillary Refill: Capillary refill takes less than 2 seconds. Findings: No rash. Neurological:      Mental Status: She is alert and oriented to person, place, and time. Deep Tendon Reflexes: Reflexes are normal and symmetric. Psychiatric:         Behavior: Behavior normal.         Thought Content: Thought content normal.         Judgment: Judgment normal.         DIAGNOSTIC RESULTS     EKG:All EKG's are interpreted by the Emergency Department Physician who either signs or Co-signs this chart in the absence of a cardiologist.    Sinus tachycardia, rate 108, normal intervals, normal axis, no ST segment changes    RADIOLOGY:   Non-plain film images such as CT, Ultrasound and MRI are read by theradiologist. Plain radiographic images are visualized and preliminarily interpreted by the emergency physician with the below findings:    Interpretation per theRadiologist below, if available at the time of this note:    CTA Chest W WO  (PE study)   Final Result   1. LIMITED EXAMINATION DUE TO SUBOPTIMAL OPACIFICATION. WITHIN THE LIMITS OF THE EXAMINATION NO GROSS CENTRAL OR GROSS PROXIMAL PULMONARY EMBOLI. 2. THERE IS PATCHY MULTIFOCAL TO CONFLUENT AIRSPACE DISEASE THROUGHOUT THE LUNG PARENCHYMA BILATERALLY. COMMONLY REPORT IMAGING FEATURES OF (COVID-19) PNEUMONIA ARE PRESENT. OTHER PROCESSES SUCH AS INFLUENZA, PNEUMONIA AND ORGANIZING PNEUMONIA AS CAN BE    SEEN WITH DRUG TOXICITY AND CONNECTIVE TISSUE DISEASE CAN CAUSE A SIMILAR IMAGING PATTERN.    3. CHOLELITHIASIS   4. OTHER FINDINGS DETAILED ABOVE         All CT scans at this facility use dose modulation, iterative reconstruction, and/or weight based dosing when appropriate to reduce radiation dose to as low as reasonably achievable.             XR CHEST PORTABLE    (Results Pending)           LABS:  Labs Reviewed   COVID-19, RAPID - Abnormal; Notable for the following components:       Result Value    SARS-CoV-2, NAAT DETECTED (*)     All other components within normal limits    Narrative:     Ricardo Albarran tel. [phone],  called Faisal Larson, 04/12/2021 19:18, by Gayla Belle   COMPREHENSIVE METABOLIC PANEL - Abnormal; Notable for the following components:    Sodium 133 (*)     Glucose 177 (*)     BUN 29 (*)     CREATININE 1.38 (*)     GFR Non- 40.4 (*)     GFR  48.9 (*)     Calcium 8.3 (*)     Albumin 3.2 (*)     Alkaline Phosphatase 132 (*)     ALT 40 (*)     AST 65 (*)     Globulin 4.0 (*)     All other components within normal limits   CBC WITH AUTO DIFFERENTIAL - Abnormal; Notable for the following components:    Hemoglobin 11.3 (*)     Hematocrit 35.3 (*)     MCV 73.1 (*)     MCH 23.4 (*)     MCHC 32.0 (*)     RDW 17.1 (*)     Lymphocytes Absolute 0.8 (*)     Monocytes Absolute 0.1 (*)     All other components within normal limits   BETA-HYDROXYBUTYRATE - Abnormal; Notable for the following components:    Beta-Hydroxybutyrate 10.4 (*)     All other components within normal limits   TSH WITHOUT REFLEX - Abnormal; Notable for the following components:    TSH 0.208 (*)     All other components within normal limits   POCT GLUCOSE - Abnormal; Notable for the following components:    POC Glucose 175 (*)     All other components within normal limits   POCT GLUCOSE - Normal   POCT CREATININE - URINE - Normal   TROPONIN   BRAIN NATRIURETIC PEPTIDE   LACTIC ACID, PLASMA   HCG, SERUM, QUALITATIVE   PROCALCITONIN   URINE RT REFLEX TO CULTURE   T4, FREE   T3       All other labs were within normal range or not returned as of this dictation. EMERGENCY DEPARTMENT COURSE and DIFFERENTIAL DIAGNOSIS/MDM:   Vitals:    Vitals:    04/12/21 1902 04/12/21 1913 04/12/21 1930 04/12/21 2008   BP:   137/68 (!) 157/74   Pulse:   109 107   Resp: 22 24 28   Temp:       TempSrc:       SpO2: 95%  95% 94%   Weight:  (!) 354 lb 15.1 oz (161 kg)     Height:             MDM    Patient does arrive fatigued but without lethargy or drowsiness. Given breathing treatments and Solu-Medrol and on nonrebreather. CXR showing bilateral PNA. CTA of chest shows no PE. There is patchy multifocal to confluent airspace disease throughout the lung parenchyma bilaterally. BUN 29, creatinine 1.38, glucose 177, ALT 40, AST 65, TSH 0.208, beta-hydroxy 10.4. Pt is currently 94-95% on NRB, lungs do appear more open and patient appears more comfortable In the cart with eyes open. Mild labored respirations. She will be admitted at this time. CRITICAL CARE TIME   Total Critical Caretime was 0 minutes, excluding separately reportable procedures. There was a high probability of clinically significant/life threatening deterioration in the patient's condition which required my urgent intervention. Procedures    FINAL IMPRESSION      1. COVID-19    2. Hypoxia    3. Pneumonia due to organism          DISPOSITION/PLAN   DISPOSITION Decision To Admit 04/12/2021 07:33:45 PM      PATIENT REFERRED TO:  No follow-up provider specified. DISCHARGE MEDICATIONS:  New Prescriptions    No medications on file          (Please notethat portions of this note were completed with a voice recognition program.  Efforts were made to edit the dictations but occasionally words are mis-transcribed. )    TOMY Ann (electronically signed)  Emergency Physician Assistant         Cintia [place]  04/12/21 2046

## 2021-04-12 NOTE — ED TRIAGE NOTES
Patient presents through triage for complaints of fever and shortness of breath that has worsened over the last few days. Patient received her 1st dose of her COVID vaccine last Friday morning, and her symptoms began Friday evening. Patient arrives with cool, pale skin. Lips grey on arrival, 74% on room air in triage. Patient taken to room 5 and placed on NRB at 15L, 96% SpO2 at this time.

## 2021-04-12 NOTE — ED NOTES
TOMY Zee, [facility] to assess patient.       Maggie Clemente RN  04/12/21 2624

## 2021-04-13 ENCOUNTER — APPOINTMENT (OUTPATIENT)
Dept: ULTRASOUND IMAGING | Age: 51
DRG: 177 | End: 2021-04-13
Payer: COMMERCIAL

## 2021-04-13 LAB
ALBUMIN SERPL-MCNC: 3.2 G/DL (ref 3.5–4.6)
ALP BLD-CCNC: 140 U/L (ref 40–130)
ALT SERPL-CCNC: 41 U/L (ref 0–33)
ANION GAP SERPL CALCULATED.3IONS-SCNC: 14 MEQ/L (ref 9–15)
APTT: 32 SEC (ref 24.4–36.8)
AST SERPL-CCNC: 58 U/L (ref 0–35)
BASOPHILS ABSOLUTE: 0 K/UL (ref 0–0.2)
BASOPHILS RELATIVE PERCENT: 0.2 %
BILIRUB SERPL-MCNC: 0.3 MG/DL (ref 0.2–0.7)
BUN BLDV-MCNC: 29 MG/DL (ref 6–20)
C-REACTIVE PROTEIN: 142 MG/L (ref 0–5)
CALCIUM SERPL-MCNC: 8.5 MG/DL (ref 8.5–9.9)
CHLORIDE BLD-SCNC: 99 MEQ/L (ref 95–107)
CO2: 21 MEQ/L (ref 20–31)
CREAT SERPL-MCNC: 1.25 MG/DL (ref 0.5–0.9)
D DIMER: 0.91 MG/L FEU (ref 0–0.5)
EOSINOPHILS ABSOLUTE: 0 K/UL (ref 0–0.7)
EOSINOPHILS RELATIVE PERCENT: 0 %
FIBRINOGEN: 505 MG/DL (ref 235–507)
GFR AFRICAN AMERICAN: 48
GFR AFRICAN AMERICAN: 54.8
GFR NON-AFRICAN AMERICAN: 40
GFR NON-AFRICAN AMERICAN: 45.3
GLOBULIN: 4.3 G/DL (ref 2.3–3.5)
GLUCOSE BLD-MCNC: 277 MG/DL (ref 60–115)
GLUCOSE BLD-MCNC: 319 MG/DL (ref 70–99)
GLUCOSE BLD-MCNC: 338 MG/DL (ref 60–115)
GLUCOSE BLD-MCNC: 467 MG/DL (ref 60–115)
HBA1C MFR BLD: 8.2 % (ref 4.8–5.9)
HCT VFR BLD CALC: 35.4 % (ref 37–47)
HEMOGLOBIN: 11.4 G/DL (ref 12–16)
INR BLD: 1
LACTATE DEHYDROGENASE: 357 U/L (ref 135–214)
LYMPHOCYTES ABSOLUTE: 0.4 K/UL (ref 1–4.8)
LYMPHOCYTES RELATIVE PERCENT: 8.6 %
MCH RBC QN AUTO: 23.6 PG (ref 27–31.3)
MCHC RBC AUTO-ENTMCNC: 32.3 % (ref 33–37)
MCV RBC AUTO: 73.1 FL (ref 82–100)
MONOCYTES ABSOLUTE: 0.1 K/UL (ref 0.2–0.8)
MONOCYTES RELATIVE PERCENT: 2.1 %
NEUTROPHILS ABSOLUTE: 4.6 K/UL (ref 1.4–6.5)
NEUTROPHILS RELATIVE PERCENT: 89.1 %
PDW BLD-RTO: 17.5 % (ref 11.5–14.5)
PERFORMED ON: ABNORMAL
PLATELET # BLD: 230 K/UL (ref 130–400)
POC CREATININE: 1.4 MG/DL (ref 0.6–1.1)
POC SAMPLE TYPE: ABNORMAL
POTASSIUM REFLEX MAGNESIUM: 5.3 MEQ/L (ref 3.4–4.9)
PROCALCITONIN: 0.13 NG/ML (ref 0–0.15)
PROTHROMBIN TIME: 13.3 SEC (ref 12.3–14.9)
RBC # BLD: 4.84 M/UL (ref 4.2–5.4)
SODIUM BLD-SCNC: 134 MEQ/L (ref 135–144)
TOTAL PROTEIN: 7.5 G/DL (ref 6.3–8)
WBC # BLD: 5.2 K/UL (ref 4.8–10.8)

## 2021-04-13 PROCEDURE — 6360000002 HC RX W HCPCS: Performed by: INTERNAL MEDICINE

## 2021-04-13 PROCEDURE — 2580000003 HC RX 258: Performed by: INTERNAL MEDICINE

## 2021-04-13 PROCEDURE — 85610 PROTHROMBIN TIME: CPT

## 2021-04-13 PROCEDURE — 93971 EXTREMITY STUDY: CPT

## 2021-04-13 PROCEDURE — 99254 IP/OBS CNSLTJ NEW/EST MOD 60: CPT | Performed by: INTERNAL MEDICINE

## 2021-04-13 PROCEDURE — 6370000000 HC RX 637 (ALT 250 FOR IP): Performed by: INTERNAL MEDICINE

## 2021-04-13 PROCEDURE — 93010 ELECTROCARDIOGRAM REPORT: CPT | Performed by: INTERNAL MEDICINE

## 2021-04-13 PROCEDURE — 83615 LACTATE (LD) (LDH) ENZYME: CPT

## 2021-04-13 PROCEDURE — 85025 COMPLETE CBC W/AUTO DIFF WBC: CPT

## 2021-04-13 PROCEDURE — 86140 C-REACTIVE PROTEIN: CPT

## 2021-04-13 PROCEDURE — 36415 COLL VENOUS BLD VENIPUNCTURE: CPT

## 2021-04-13 PROCEDURE — 2700000000 HC OXYGEN THERAPY PER DAY

## 2021-04-13 PROCEDURE — 94761 N-INVAS EAR/PLS OXIMETRY MLT: CPT

## 2021-04-13 PROCEDURE — 85384 FIBRINOGEN ACTIVITY: CPT

## 2021-04-13 PROCEDURE — 99291 CRITICAL CARE FIRST HOUR: CPT | Performed by: INTERNAL MEDICINE

## 2021-04-13 PROCEDURE — 85730 THROMBOPLASTIN TIME PARTIAL: CPT

## 2021-04-13 PROCEDURE — 85379 FIBRIN DEGRADATION QUANT: CPT

## 2021-04-13 PROCEDURE — 84145 PROCALCITONIN (PCT): CPT

## 2021-04-13 PROCEDURE — 2500000003 HC RX 250 WO HCPCS: Performed by: INTERNAL MEDICINE

## 2021-04-13 PROCEDURE — 93970 EXTREMITY STUDY: CPT

## 2021-04-13 PROCEDURE — 2000000000 HC ICU R&B

## 2021-04-13 PROCEDURE — 80053 COMPREHEN METABOLIC PANEL: CPT

## 2021-04-13 PROCEDURE — 83036 HEMOGLOBIN GLYCOSYLATED A1C: CPT

## 2021-04-13 RX ORDER — TRAMADOL HYDROCHLORIDE 50 MG/1
100 TABLET ORAL NIGHTLY PRN
Status: DISCONTINUED | OUTPATIENT
Start: 2021-04-13 | End: 2021-04-20 | Stop reason: HOSPADM

## 2021-04-13 RX ORDER — 0.9 % SODIUM CHLORIDE 0.9 %
30 INTRAVENOUS SOLUTION INTRAVENOUS PRN
Status: DISCONTINUED | OUTPATIENT
Start: 2021-04-13 | End: 2021-04-20 | Stop reason: HOSPADM

## 2021-04-13 RX ORDER — TRAMADOL HYDROCHLORIDE 50 MG/1
50 TABLET ORAL EVERY 12 HOURS
Status: DISCONTINUED | OUTPATIENT
Start: 2021-04-13 | End: 2021-04-14

## 2021-04-13 RX ORDER — SODIUM CHLORIDE 9 MG/ML
INJECTION, SOLUTION INTRAVENOUS CONTINUOUS
Status: DISCONTINUED | OUTPATIENT
Start: 2021-04-13 | End: 2021-04-14

## 2021-04-13 RX ORDER — PREGABALIN 75 MG/1
75 CAPSULE ORAL 2 TIMES DAILY
Status: DISCONTINUED | OUTPATIENT
Start: 2021-04-13 | End: 2021-04-20 | Stop reason: HOSPADM

## 2021-04-13 RX ORDER — TRAMADOL HYDROCHLORIDE 50 MG/1
50 TABLET ORAL 2 TIMES DAILY PRN
Status: DISCONTINUED | OUTPATIENT
Start: 2021-04-13 | End: 2021-04-20 | Stop reason: HOSPADM

## 2021-04-13 RX ADMIN — INSULIN LISPRO 12 UNITS: 100 INJECTION, SOLUTION INTRAVENOUS; SUBCUTANEOUS at 16:51

## 2021-04-13 RX ADMIN — SODIUM CHLORIDE, POTASSIUM CHLORIDE, SODIUM LACTATE AND CALCIUM CHLORIDE 1000 ML: 600; 310; 30; 20 INJECTION, SOLUTION INTRAVENOUS at 01:22

## 2021-04-13 RX ADMIN — OYSTER SHELL CALCIUM WITH VITAMIN D 1 TABLET: 500; 200 TABLET, FILM COATED ORAL at 07:46

## 2021-04-13 RX ADMIN — GUAIFENESIN 200 MG: 100 SOLUTION ORAL at 17:05

## 2021-04-13 RX ADMIN — AZITHROMYCIN 250 MG: 500 INJECTION, POWDER, LYOPHILIZED, FOR SOLUTION INTRAVENOUS at 09:26

## 2021-04-13 RX ADMIN — INSULIN LISPRO 18 UNITS: 100 INJECTION, SOLUTION INTRAVENOUS; SUBCUTANEOUS at 12:10

## 2021-04-13 RX ADMIN — REMDESIVIR 200 MG: 5 INJECTION INTRAVENOUS at 11:04

## 2021-04-13 RX ADMIN — TOCILIZUMAB 800 MG: 20 INJECTION, SOLUTION, CONCENTRATE INTRAVENOUS at 20:28

## 2021-04-13 RX ADMIN — PREGABALIN 75 MG: 75 CAPSULE ORAL at 07:46

## 2021-04-13 RX ADMIN — LEVOTHYROXINE SODIUM 150 MCG: 0.07 TABLET ORAL at 05:50

## 2021-04-13 RX ADMIN — SODIUM CHLORIDE: 9 INJECTION, SOLUTION INTRAVENOUS at 09:25

## 2021-04-13 RX ADMIN — PREGABALIN 75 MG: 75 CAPSULE ORAL at 20:14

## 2021-04-13 RX ADMIN — INSULIN GLARGINE 40 UNITS: 100 INJECTION, SOLUTION SUBCUTANEOUS at 00:30

## 2021-04-13 RX ADMIN — DEXAMETHASONE SODIUM PHOSPHATE 6 MG: 4 INJECTION, SOLUTION INTRA-ARTICULAR; INTRALESIONAL; INTRAMUSCULAR; INTRAVENOUS; SOFT TISSUE at 07:46

## 2021-04-13 RX ADMIN — INSULIN GLARGINE 40 UNITS: 100 INJECTION, SOLUTION SUBCUTANEOUS at 20:14

## 2021-04-13 RX ADMIN — TRAMADOL HYDROCHLORIDE 50 MG: 50 TABLET, FILM COATED ORAL at 11:04

## 2021-04-13 RX ADMIN — TRAMADOL HYDROCHLORIDE 100 MG: 50 TABLET ORAL at 22:55

## 2021-04-13 RX ADMIN — INSULIN LISPRO 4 UNITS: 100 INJECTION, SOLUTION INTRAVENOUS; SUBCUTANEOUS at 07:47

## 2021-04-13 RX ADMIN — GUAIFENESIN 200 MG: 100 SOLUTION ORAL at 01:36

## 2021-04-13 RX ADMIN — ENOXAPARIN SODIUM 80 MG: 80 INJECTION SUBCUTANEOUS at 20:15

## 2021-04-13 RX ADMIN — ENOXAPARIN SODIUM 30 MG: 30 INJECTION SUBCUTANEOUS at 07:45

## 2021-04-13 RX ADMIN — QUETIAPINE FUMARATE 50 MG: 50 TABLET ORAL at 20:15

## 2021-04-13 RX ADMIN — TRAMADOL HYDROCHLORIDE 50 MG: 50 TABLET, FILM COATED ORAL at 17:05

## 2021-04-13 RX ADMIN — PANTOPRAZOLE SODIUM 40 MG: 40 TABLET, DELAYED RELEASE ORAL at 05:50

## 2021-04-13 RX ADMIN — QUETIAPINE FUMARATE 50 MG: 50 TABLET ORAL at 07:46

## 2021-04-13 RX ADMIN — QUETIAPINE FUMARATE 50 MG: 50 TABLET ORAL at 00:53

## 2021-04-13 ASSESSMENT — ENCOUNTER SYMPTOMS
GASTROINTESTINAL NEGATIVE: 1
COUGH: 1
SHORTNESS OF BREATH: 1
EYES NEGATIVE: 1

## 2021-04-13 ASSESSMENT — PAIN SCALES - GENERAL
PAINLEVEL_OUTOF10: 5
PAINLEVEL_OUTOF10: 4
PAINLEVEL_OUTOF10: 7

## 2021-04-13 NOTE — PROGRESS NOTES
Order for Remdesivir received from Dr. Katerina Nazario    1)  Confirmed drug availability for complete patient course of therapy (200 mg IV x 1, then 100 mg daily on days 2-5)    2)  Inclusion Criteria:  Reviewed/Confirmed with provider criteria present   Adults, children (?3.5Kg, only use lyophilized powder), or pregnant women with proven COVID19 as defined by a positive nasopharyngeal swab, tracheal aspirate or sputum   SARS-CoV-2 PCR or a positive serology test requiring hospitalization for COVID-19-severe pneumonia.    Requiring supplemental oxygen      Ref Range & Units 4/12/21 1856   SARS-CoV-2, NAAT Not Detected DETECTEDAbnormal  VC    Comment: Corrected result; previously reported as Not Detected on 04/12/2021 at 19:       3)  Recommend prioritization of patients who present with symptom onset < 14 days and within 10 days of acute care admission     4)  Exclusion Criteria:  Reviewed/Confirmed none of the following  present: (criteria in bold present during review; risk/benefit rationale of physician documented)    Requiring invasive or non-invasive mechanical ventilation  A) Consider use in patients requiring high-flow oxygen early in the disease state (based on symptom duration)    Use of more than 1 vasopressor agent prior to start of remdesivir    Already improving on current treatment/supportive regimen as evidenced by improving oxygenation, and/or impending discharge    Patients in whom the clinical team think death is in the immediate short-term whereby administration of RDV unlikely to change clinical outcome     5) Monitoring Parameters:  CMP (includes hepatic panel) x 5 days    Consider discontinuation of remdesivir if LFTs substantially increase following initiation of therapy (ie: 5x baseline lab values) or if ALT elevation is accompanied by signs or symptoms of liver inflammation    GFR < 30mL/min: Use with caution due to risk of cyclodextrin toxicity with IV solution as it accumulates in reduced renal clearance       Recent Labs     04/12/21  2200 04/13/21  0545   CREATININE 1.4* 1.25*   Estimated Creatinine Clearance: 85 mL/min (A) (based on SCr of 1.25 mg/dL (H)). Recent Labs     04/13/21  0545   ALT 41*   AST 58*       Will continue to monitor. Thank you.   Sharmin CoronelD

## 2021-04-13 NOTE — CONSULTS
Infectious Disease     Patient Name: Teresa Odom  Date: 4/13/2021  YOB: 1970  Medical Record Number: [id number]        COVID-19 pneumonia      History of Present Illness:  Degenerative joint disease hypothyroidism diabetes    Patient presents with increasing shortness of breath dyspnea on exertion cough weakness fatigue symptoms were progressively worsening over several days patient had low-grade temp 99 4 on admission creatinine was somewhat elevated at 1.38    Patient tested positive for COVID-19    CTshowed lateral groundglass infiltrate  Procalcitonin normal    Currently on high flow oxygen 50 L a minute satting 93%      Review of Systems   Constitutional: Positive for chills, diaphoresis, fatigue and fever. HENT: Negative. Eyes: Negative. Respiratory: Positive for cough and shortness of breath. Cardiovascular: Negative. Gastrointestinal: Negative. Genitourinary: Negative. Musculoskeletal: Negative. Skin: Negative. Neurological: Negative. Hematological: Negative. Review of Systems: All 14 review of systems negative other than as stated above    Social History     Tobacco Use    Smoking status: Never Smoker    Smokeless tobacco: Never Used   Substance Use Topics    Alcohol use: No    Drug use: No         Past Medical History:   Diagnosis Date    Arthritis     right knee and ankle    Pilonidal cyst     Thyroid disease     hypothyroid    Type 1 diabetes ([facility] [address]            Past Surgical History:   Procedure Laterality Date    APPENDECTOMY      ARTHROSCOPY / ARTHROTOMY KNEE Right 11/8/2016    RIGHT KNEE ARTHROSCOPY / MEDIAL MENISCUS TEAR / SUPINE  performed by Sandrine Ferrell MD at [address] [place] [address]         No current facility-administered medications on file prior to encounter.       Current Outpatient Medications on File Prior to Encounter   Medication Sig Dispense Refill    metFORMIN (GLUCOPHAGE) 500 MG tablet TAKE 1 TABLET TWICE DAILY round, and reactive to light. Neck: Normal range of motion. Neck supple. No thyromegaly present. Cardiovascular: Normal heart sounds. No murmur heard. Pulmonary/Chest: Effort normal. No respiratory distress. She has no wheezes. She has no rales. She exhibits no tenderness. Abdominal: Soft. She exhibits no distension and no mass. There is no abdominal tenderness. There is no rebound and no guarding. Musculoskeletal:         General: No tenderness or edema. Lymphadenopathy:     She has no cervical adenopathy. Skin: No rash noted. She is not diaphoretic. No erythema. Blood pressure 121/82, pulse 97, temperature 98.8 °F (37.1 °C), temperature source Oral, resp. rate 17, height 5' 6\" (1.676 m), weight (!) 354 lb 15.1 oz (161 kg), SpO2 93 %, not currently breastfeeding.       .   Lab Results   Component Value Date    WBC 5.2 04/13/2021    HGB 11.4 (L) 04/13/2021    HCT 35.4 (L) 04/13/2021    MCV 73.1 (L) 04/13/2021     04/13/2021     Lab Results   Component Value Date     04/13/2021    K 5.3 04/13/2021    CL 99 04/13/2021    CO2 21 04/13/2021    BUN 29 04/13/2021    CREATININE 1.25 04/13/2021    GLUCOSE 319 04/13/2021    CALCIUM 8.5 04/13/2021                ASSESSMENT:  Patient Active Problem List   Diagnosis    Pilonidal cyst    Panic attacks    Obesity    Type 1 diabetes mellitus ([address]    Background retinopathy due to secondary diabetes ([address]    Chronic lymphocytic thyroiditis    Age related cataract    Acquired hypothyroidism    Vitamin D deficiency    Brow ptosis    Long-term insulin use ([address]    Nuclear sclerotic cataract of both eyes    Respiratory failure ([address]         PLAN:    COVID-19 pneumonia    Patient was placed on dexamethasone remdesivir  Also  on Zithromax    Add Actemra per IDSA guidelines   discontinue Zithromax

## 2021-04-13 NOTE — PATIENT CARE CONFERENCE
Spoke to patients , Mr. Caleb Quintanilla and updated him on wife status. All questions answered. He is going to bring up her cell phone.  Electronically signed by Paige Fritz RN on 4/13/2021 at 10:11 AM

## 2021-04-13 NOTE — PROGRESS NOTES
Following up in a patient. The patient continues to have shortness of breath. She is on high flow oxygen. She denies any chest or abdominal pain. No nausea or vomiting. No dysuria or hematuria. ROS: 12 system review otherwise is negative for acute signs or symptoms over the last 24 hrs. Exam: Awake, alert oriented, in mild respiratory distress on high flow oxygen. The patient is morbidly obese. HEENT: Pale conjunctiva and buccal mucosa. Normal and throat and nose  Neck: Supple, no nuchal rigidity. Endo: No thyromegaly. Vascular: No JVD or carotid bruit. Chest: Diminished breath sounds and soft crackles. Heart: Regular rate and rhythm, no extra sounds. No murmur, no rub. Abdomen: Soft, no tenderness, no rebound, no rigidity. Increased abdominal girth therefore clinically I could not exclude the possibility of intra-abdominal mass or organomegaly. LE: No cyanosis or clubbing, no varices or edema. Neuro: Awake, alert, oriented, normal speech, normal comprehension, normal extension, normal cranial nerves, normal and symmetrical motor and tone examination throughout. MS: No joint effusion or tenderness. Skin: No skin rash, itching, bruising or significant findings. Assessment and plan:     *Hypoxic respiratory failure. *Bilateral infiltrates highly suggestive of a COVID-19 pneumonia. *Diabetes with hyperglycemia. *Morbid obesity. *LEI, improving. *Anemia, no evidence of acute blood loss. Plan:  Continue Decadron and remdesivir pending pulmonary and infectious disease evaluation and recommendation. I will follow. Increase her sliding scale dose to achieve better blood sugar control. Daily monitoring of electrolytes, kidney function, liver function, WBC and hemoglobin. Lovenox for DVT prophylaxis. I may or may not have addressed all of this pt symptoms, medical issues, abnormal labs and findings.  Pt will need additional work up, investigation, testing, surveillance, and treatment to be done at a later time and date during this hospitalization or post discharge by PCP and other out pt providers. Portion of patient care is managed by other providers. Please refer to their notes for details.

## 2021-04-13 NOTE — CONSULTS
Pulmonary and Critical Care Medicine  Consult Note  Encounter Date: 2021 8:30 AM    Ms. Fady Chatman is a 48 y.o. female  : 1970  Requesting Provider: Dr. Ruth Cason    Reason for request: resp failure          HISTORY OF PRESENT ILLNESS:    Patient is 48 y.o. presents to the hospital on  with complaints of shortness of breath and fever. She was noted to be febrile upon presentation and hypoxic. She did require nonrebreather to keep her oxygen saturations in the 90% range. The patient was found to be positive for COVID-19. She does live with her daughter who is positive several weeks ago. The patient states that her daughter was able to finish her quarantine out at home and return to work. She did have a CT scan of her chest in the emergency department that demonstrated evidence of diffuse groundglass opacities bilaterally. The patient was subsequently admitted to the hospitalist service. Pulmonary is consulted secondary to respiratory failure. The patient is currently on Airvo. When I examined the patient this morning she was asking to get out of bed to use the bedside commode, however she was requiring 55 L on Airvo at 100% FiO2. Past Medical History:        Diagnosis Date    Arthritis     right knee and ankle    Pilonidal cyst     Thyroid disease     hypothyroid    Type 1 diabetes ([address]        Past Surgical History:        Procedure Laterality Date    APPENDECTOMY      ARTHROSCOPY / ARTHROTOMY KNEE Right 2016    RIGHT KNEE ARTHROSCOPY / MEDIAL MENISCUS TEAR / SUPINE  performed by Deshaun Turnre MD at [address] [place] [address]       Social History:     reports that she has never smoked. She has never used smokeless tobacco. She reports that she does not drink alcohol or use drugs.     Family History:       Problem Relation Age of Onset    Arthritis Mother     Heart Disease Father     High Blood Pressure Father     No Known Problems Daughter Allergies:  Patient has no known allergies. MEDICATIONS during current hospitalization:    Continuous Infusions:   dextrose         Scheduled Meds:   pregabalin  75 mg Oral BID    enoxaparin  30 mg Subcutaneous BID    dexamethasone  6 mg Intravenous Q24H    insulin lispro  0-6 Units Subcutaneous TID WC    insulin lispro  0-3 Units Subcutaneous Nightly    pantoprazole  40 mg Oral QAM AC    levothyroxine  150 mcg Oral Daily    calcium-vitamin D  1 tablet Oral Daily    insulin glargine  40 Units Subcutaneous Nightly    lactated ringers bolus  1,000 mL Intravenous Once    QUEtiapine  50 mg Oral BID       PRN Meds:promethazine **OR** ondansetron, polyethylene glycol, acetaminophen **OR** acetaminophen, guaiFENesin, albuterol-ipratropium, glucose, dextrose, glucagon (rDNA), dextrose        REVIEW OF SYSTEMS:  ROS: 10 organs review of system is done including general, psychological, ENT, hematological, endocrine, respiratory, cardiovascular, gastrointestinal, musculoskeletal, neurological,  allergy and Immunology is done and is otherwise negative.     PHYSICAL EXAM:    Vitals:  BP (!) 147/72   Pulse 87   Temp 97.8 °F (36.6 °C) (Oral)   Resp 27   Ht 5' 6\" (1.676 m)   Wt (!) 354 lb 15.1 oz (161 kg)   SpO2 92%   BMI 57.29 kg/m²     General: Resting comfortably in bed, Airvo in place  HEENT: Normocephalic, atraumatic, pupils equal round and reactive to light  Lungs : Crackles bilaterally, no wheezes, no respiratory distress at rest  Heart[de-identified] Regular rate and rhythm  ABD: Obese, positive bowel sounds, soft, nontender to palpation  Extremities : Warm, dry  Neuro: Awake, alert, oriented x4  Skin: No rashes appreciated    Data Review  Recent Labs     04/12/21 1917 04/12/21 2200 04/13/21  0545   WBC 6.1  --  5.2   HGB 11.3* 13.1 11.4*   HCT 35.3*  --  35.4*     --  230      Recent Labs     04/12/21  1856 04/12/21  1900 04/12/21 1914 04/12/21 2200 04/13/21  0545   NA  --  133*  --   --  134* K  --  4.4  --   --  5.3*   CL  --  98  --   --  99   CO2  --  22  --   --  21   BUN  --  29*  --   --  29*   CREATININE  --  1.38* 1.4* 1.4* 1.25*   GLUCOSE 175 177*  --   --  319*          ABGs:   Recent Labs     04/12/21  2200   PHART 7.410   XFA7TEA 35   PO2ART 85*   IBZ5LZE 22.2   BEART -2   V9YPATNJ 97*   DFW8QID 23     O2 Device: Heated high flow cannula  O2 Flow Rate (L/min): 55 L/min  Lab Results   Component Value Date    LACTA 1.4 04/12/2021       Radiology     Images and report of CT chest from 4/12 reviewed--Limited examination due to suboptimal opacification, no obvious pulmonary embolism, patchy multifocal to confluent airspace disease throughout the lung parenchyma bilaterally, cholelithiasis      Assessment/Plan:     1. Acute hypoxic respiratory failure--this is secondary to COVID-19 pneumonitis. She has been started on remdesivir and Decadron. Infectious disease has been consulted. She is currently requiring Airvo at high settings to keep her oxygen saturations at an appropriate range. She is currently on 55 L at 100% FiO2. This will be decreased as able. I discussed with the patient that she should continue to use the bedpan until her oxygen requirement is down to 70 to 75%. 2. COVID-19 pneumonitis--she is currently undergoing treatment with remdesivir and Decadron. Infectious disease has been consulted and will evaluate the patient. At this time we will continue with current supportive care and decrease her oxygen requirement as able. 3. Diabetes mellitus--continue with management per the hospitalist service    4. Elevated D-dimer --this is likely secondary to COVID-19 pneumonitis. Ultrasounds will be ordered to ensure that she does not have a DVT. She is currently on Lovenox 30 mg twice daily. Given that she is positive for COVID-19 she should be on accelerated dose of Lovenox at 0.5 mg/kg twice daily.     Nutrition: Oral diet as tolerated    Code Status: Full code    Prophylaxis: Lovenox for DVT prophylaxis. Protonix for GI prophylaxis. This is a 48 y.o. critically ill female from acute hypoxic respiratory failure and COVID-19. I did spend a total of 33 minutes of critical care time with the patient, and review of the chart, and discussion with the bedside staff. This time is exclusive of any billable procedures.     Thank you for consultation    Electronically signed by Solomon Casiano DO, on 4/13/2021 at 8:30 AM

## 2021-04-13 NOTE — CARE COORDINATION
[facility] EMERGENCY [facility] CENTER AT [place] Case Management Initial Discharge Assessment    Met with patient at bedside and spoke with her spouse via phone to discuss patient's baseline status, available resources/support, and tentative discharge plan. PCP: Tello Lynn MD                                  Date of Last Visit: 11/2020  If no PCP, list provided? N/A    Discharge Planning    Living Arrangements: Patient lives independently at home in a two-story house with bed and bath on the second floor. Who do you live with? Spouse and two adult daughters. Who helps you with your care:  Patient is independent at baseline but spouse states he or patient's daughters can assist her with anything she needs. If lives at home:  Do you have any barriers navigating in your home? Spouse reports that patient has a brace for her right ankle to assist with stability and uses a cane for all ambulation. Patient can perform ADL? Yes    Current Services (outpatient and in home) :  None    Dialysis: No    Is transportation available to get to your appointments? Yes - Patient's spouse or daughter drive her to appointments. DME Equipment: VICKY Clements brace    Respiratory equipment: None    Respiratory provider:  JAYLEN     Pharmacy:  Mail order     Consult with Medication Assistance Program?  No      [facility], [place]    Patient agreeable to SNF/Rehab? TBD based on needs, options discussed with spouse. Other discharge needs identified? Other - TBD. Patient may require home O2 based on illness course and recovery. Freedom of choice list provided with basic dialogue that supports the patient's individualized plan of care/goals and shares the quality data associated with the providers. Yes    Does Patient Have a High-Risk for Readmission Diagnosis (CHF, PN, MI, COPD)?  No    The plan for Transition of Care is related to the following treatment goals: Evaluation and management of hypoxia, COVID

## 2021-04-13 NOTE — ACP (ADVANCE CARE PLANNING)
Advance Care Planning     Advance Care Planning Activator (Inpatient)  Conversation Note      Date of ACP Conversation: 4/12/2021    Conversation Conducted with: Patient with Decision Making Capacity at bedside in ER and shared patient's responses with her spouse via phone call. ACP Activator: [facility] Decision Maker:     Current Designated Health Care Decision Maker:     Primary Decision Maker: Latasha Dill - Spouse - [phone]    Secondary Decision Maker: Miguel Corea - Child - [phone]    Secondary Decision Maker: Radha Mallory - Child - [phone]     Click here to complete Healthcare Decision Makers including section of the Healthcare Decision Maker Relationship (ie \"Primary\")  Today we documented Decision Maker(s) consistent with Legal Next of Kin hierarchy. Care Preferences    Ventilation: \"If you were in your present state of health and suddenly became very ill and were unable to breathe on your own, what would your preference be about the use of a ventilator (breathing machine) if it were available to you? \"      Would the patient desire the use of ventilator (breathing machine)?: yes    \"If your health worsens and it becomes clear that your chance of recovery is unlikely, what would your preference be about the use of a ventilator (breathing machine) if it were available to you? \"     Would the patient desire the use of ventilator (breathing machine)?: No    Resuscitation  \"CPR works best to restart the heart when there is a sudden event, like a heart attack, in someone who is otherwise healthy. Unfortunately, CPR does not typically restart the heart for people who have serious health conditions or who are very sick. \"    \"In the event your heart stopped as a result of an underlying serious health condition, would you want attempts to be made to restart your heart (answer \"yes\" for attempt to resuscitate) or would you prefer a natural death (answer \"no\" for do not attempt to resuscitate)? \" yes     [] Yes   [] No   Educated Patient / Joyce Bee regarding differences between Advance Directives and portable DNR orders. Length of ACP Conversation in minutes:      Conversation Outcomes:  [x] ACP discussion completed  [] Existing advance directive reviewed with patient; no changes to patient's previously recorded wishes  [] New Advance Directive completed  [] Portable Do Not Rescitate prepared for Provider review and signature  [] POLST/POST/MOLST/MOST prepared for Provider review and signature    Follow-up plan:    [] Schedule follow-up conversation to continue planning  [x] Referred individual to Provider for additional questions/concerns   [] Advised patient/agent/surrogate to review completed ACP document and update if needed with changes in condition, patient preferences or care setting    [x] This note routed to one or more involved healthcare providers.

## 2021-04-13 NOTE — H&P
Hospital Medicine  History and Physical    Patient:  Dipti Magana  MRN: [id number]    CHIEF COMPLAINT:    Chief Complaint   Patient presents with    Shortness of Breath     since Friday    Fever       History Obtained From:  patient, electronic medical record  Primary Care Physician: Gina Stovall MD    HISTORY OF PRESENT ILLNESS:   The patient is a 48 y.o. female who presents with acute hypoxic respiratory failure. Patient is a 31-year-old female who notes increasing shortness of breath, dyspnea on exertion, cough weakness and fatigue of several days duration. Patient came to the emergency department with symptoms progressively worsened, Covid testing is positive. Patient has mildly elevated temperature 99.4 on arrival respiration rate 28 pulse 110 blood pressure 158/87 requiring 15 L nasal cannula O2 for saturation above 90%. Basic metabolic panel significant for mildly elevated BUN and creatinine 29 and 1.38 with no previous abnormal renal function appreciated. LFTs show mildly elevated ALT and AST at 40 and 65, although these were also elevated in 2017. Patient does not use home O2 and CT scan shows diffuse groundglass opacities bilaterally. Patient's daughter tested positive for Covid 2 weeks previously however has been isolating at home. Incidentally the patient did have her Covid vaccine 4 days previously. Patient was treated with Solu-Medrol in the emergency department along with nonrebreather. ABG showed hypoxic respiratory failure requiring 100% FiO2 however showed no hypercapnia.   CT of the chest chest did not show any pulmonary embolus    Past Medical History:      Diagnosis Date    Arthritis     right knee and ankle    Pilonidal cyst     Thyroid disease     hypothyroid    Type 1 diabetes [address] [address]        Past Surgical History:      Procedure Laterality Date    APPENDECTOMY      ARTHROSCOPY / ARTHROTOMY KNEE Right 11/8/2016    RIGHT KNEE ARTHROSCOPY / MEDIAL MENISCUS TEAR / SUPINE performed by Sandra Morris MD at [address]  92-19       Medications Prior to Admission:    Prior to Admission medications    Medication Sig Start Date End Date Taking? Authorizing Provider   metFORMIN (GLUCOPHAGE) 500 MG tablet TAKE 1 TABLET TWICE DAILY  WITH MEALS 1/22/21   Colt Cash MD   HUMALOG 100 UNIT/ML injection vial INJECT PER INSULIN PUMP    MAXIMUM DOSE 100 UNITS PER DAY SUBCUTANEOUSLY 1/22/21   Kalvin Cockayne, MD   ALPRAZolam (XANAX) 0.5 MG tablet TAKE 1 TABLET BY MOUTH THREE TIMES DAILY AS NEEDED for sleep or anxiety for up to 30 days 11/17/20 12/17/20  Kristopher Moraes MD   levothyroxine (SYNTHROID) 125 MCG tablet 2 po daily 10/20/20   Kalvin Cockayne, MD   traZODone (DESYREL) 50 MG tablet TAKE 3 TABLETS AT BEDTIME 7/20/20   Kristopher Moraes MD   lisinopril (PRINIVIL;ZESTRIL) 10 MG tablet TAKE 1 TABLET DAILY 7/20/20   Kristopher Moraes MD   pantoprazole (PROTONIX) 40 MG tablet TAKE 1 TABLET DAILY 7/20/20   Kristopher Moraes MD   QUEtiapine (SEROQUEL) 50 MG tablet TAKE 1 TABLET EVERY MORNINGAND TAKE 2 TABLETS EVERY   EVENING 7/20/20   Kristopher Moraes MD   albuterol sulfate  (90 Base) MCG/ACT inhaler INHALE 2 PUFFS EVERY 4 TO 6 HOURS AS NEEDED 7/3/19   Historical Provider, MD   traMADol [facility]) 100 MG TB24 extended release tablet TAKE 1 TABLET BY MOUTH EVERY night AS NEEDED FOR PAIN FOR 28 DAYS. 7/17/19   Historical Provider, MD   traMADol (ULTRAM) 50 MG tablet TAKE 1 TABLET BY MOUTH TWICE DAILY AS NEEDED FOR PAIN 11/7/18   Historical Provider, MD   mometasone (ELOCON) 0.1 % cream Apply topically daily. 9/10/18   Kristopher Moraes MD   Calcium Carb-Cholecalciferol (CALCIUM 600 + D) 600-200 MG-UNIT TABS one a day 1/6/04   Historical Provider, MD   LYRICA 150 MG capsule Place 150 mg into the right eye 2 times daily.  3/14/18   Historical Provider, MD   insulin glargine (LANTUS) 100 UNIT/ML injection vial 40 units at bedtime  Patient taking differently: Indications: Pt to use as backup if insulin pump fails 40 units at bedtime 5/19/16   Hnery Sandoval MD   Insulin Syringe-Needle U-100 ([employer] INSULIN SYRINGE) 31G X 5/16\" 0.5 ML MISC 1 each by Does not apply route daily 5/17/16   Henry Sandoval MD   INSULIN INFUSION PUMP by Does not apply route. Historical Provider, MD       Allergies:  Patient has no known allergies. Social History:   TOBACCO:   reports that she has never smoked. She has never used smokeless tobacco.  ETOH:   reports no history of alcohol use. OCCUPATION: None    Family History:       Problem Relation Age of Onset    Arthritis Mother     Heart Disease Father     High Blood Pressure Father     No Known Problems Daughter        REVIEW OF SYSTEMS:  Ten systems reviewed and negative except for as above. Physical Exam:    Vitals: /75   Pulse 106   Temp 99.4 °F (37.4 °C) (Oral)   Resp 20   Ht 5' 6\" (1.676 m)   Wt (!) 354 lb 15.1 oz (161 kg)   SpO2 96%   BMI 57.29 kg/m²   Constitutional: alert, appears stated age and cooperative, pleasant, morbidly obese  Skin: Skin color, texture, turgor normal. No rashes or lesions  Eyes:Eye: Normal external eye, conjunctiva, BLAS. ENT: Head: Normocephalic, no lesions, without obvious abnormality. Neck: no adenopathy, no carotid bruit, no JVD, supple, symmetrical, trachea midline and thyroid not enlarged, symmetric, no tenderness/mass/nodules  Respiratory: Decreased breath sounds throughout no wheezes rales or rhonchi  Cardiovascular: regular rate and rhythm, S1, S2 normal, no murmur, click, rub or gallop  Gastrointestinal: soft, non-tender; bowel sounds normal; no masses,  no organomegaly  Genitourinary: Deferred  Musculoskeletal:extremities normal, atraumatic, no cyanosis. Bilateral lower extremity trace edema  Neurologic: Mental status AAOx3 No facial asymmetry or droop. Normal muscle strength b/l. Psychiatric: Appropriate mood and affect.  Good insight and judgement  Hematologic: No obvious bruising or bleeding    Recent Labs 04/12/21 1917 04/12/21 2200   WBC 6.1  --    HGB 11.3* 13.1     --      Recent Labs     04/12/21  1856 04/12/21 1900 04/12/21 2200   NA  --  133*  --    K  --  4.4  --    CL  --  98  --    CO2  --  22  --    BUN  --  29*  --    CREATININE  --  1.38* 1.4*   GLUCOSE 175 177*  --    AST  --  65*  --    ALT  --  40*  --    BILITOT  --  0.4  --    ALKPHOS  --  132*  --      Troponin T:   Recent Labs     04/12/21 1900   TROPONINI <0.010     ABGs:   Lab Results   Component Value Date    PHART 7.410 04/12/2021    PO2ART 85 04/12/2021    VJA9BXL 35 04/12/2021     INR: No results for input(s): INR in the last 72 hours. URINALYSIS:No results for input(s): NITRITE, COLORU, PHUR, LABCAST, WBCUA, RBCUA, MUCUS, TRICHOMONAS, YEAST, BACTERIA, CLARITYU, SPECGRAV, LEUKOCYTESUR, UROBILINOGEN, BILIRUBINUR, BLOODU, GLUCOSEU, AMORPHOUS in the last 72 hours. Invalid input(s): Steve Moses  -----------------------------------------------------------------   Cta Chest W Wo  (pe Study)    Result Date: 4/12/2021  The EXAMINATION: CT scan of the chest with contrast (pulmonary embolism protocol) INDICATION: Chest pain and shortness of breath. COMPARISON: None TECHNIQUE: Helical CT was performed through the chest utilizing 100 cc of Isovue-300 intravenous contrast.  Images were obtained with bolus tracking in order to opacify the pulmonary arteries. Both MIP and 3D volume rendered reconstructions were performed. FINDINGS: This is a limited examination due to suboptimal opacification. Within limits examination no gross central or proximal pulmonary emboli. There is patchy multifocal to confluent airspace disease throughout the lung parenchyma bilaterally. No pleural effusions. No pneumothoraces. No significant Aortic, pretracheal, parahilar or subcarinal adenopathy. Within the field-of-view of the abdomen there is a small hiatal hernia.  There is findings of multiple foci within the gallbladder most likely consistent with cholelithiasis. Osseous structures are intact. 1. LIMITED EXAMINATION DUE TO SUBOPTIMAL OPACIFICATION. WITHIN THE LIMITS OF THE EXAMINATION NO GROSS CENTRAL OR GROSS PROXIMAL PULMONARY EMBOLI. 2. THERE IS PATCHY MULTIFOCAL TO CONFLUENT AIRSPACE DISEASE THROUGHOUT THE LUNG PARENCHYMA BILATERALLY. COMMONLY REPORT IMAGING FEATURES OF (COVID-19) PNEUMONIA ARE PRESENT. OTHER PROCESSES SUCH AS INFLUENZA, PNEUMONIA AND ORGANIZING PNEUMONIA AS CAN BE SEEN WITH DRUG TOXICITY AND CONNECTIVE TISSUE DISEASE CAN CAUSE A SIMILAR IMAGING PATTERN. 3. CHOLELITHIASIS 4. OTHER FINDINGS DETAILED ABOVE All CT scans at this facility use dose modulation, iterative reconstruction, and/or weight based dosing when appropriate to reduce radiation dose to as low as reasonably achievable. EKG: EKG shows sinus tachycardia    Assessment and Plan   1. Acute hypoxic respiratory failure secondary to COVID-19 pneumonia: Remdesivir, Decadron, Mucinex, Combivent. Consult to pulmonology given pt would likely benefit from tocilizumab. Lovenox per Covid protocol. Continue to follow inflammatory markers and coagulopathy markers. Will hold off on xanax given degree of hypoxia and respiratory failure until we can evaluate response to high flow O2. Likewise hold ultram for now, can use as needed if necessary. 2. Type 2 diabetes with neuropathy: Sliding scale insulin coverage given steroid administration, will likely need to escalate from home regimen given the steroid administered along with inflammation. Hold lyrica as mar suggests placement into the right eye BID, and is potentially a medication error. 3. Hypothyroid disease: resume synthroid reduce dose to 150 mcg daily given low TSH level.    4. DVT prophylaxis per Covid protocol    Approximately 50 minutes critical care time evaluating patient directly providing patient care not including any procedural intervention    Patient Active Problem List   Diagnosis Code    Pilonidal cyst L05.91    Panic attacks F41.0    Obesity E66.9    Type 1 diabetes mellitus (HCC) E10.9    Background retinopathy due to secondary diabetes ([facility] [address] E13.3299    Chronic lymphocytic thyroiditis E06.3    Age related cataract H25.9    Acquired hypothyroidism E03.9    Vitamin D deficiency E55.9    Brow ptosis H57.819    Long-term insulin use (HCC) Z79.4    Nuclear sclerotic cataract of both eyes H25.13    Respiratory failure ([facility] [address] J96.90       Tim Paris DO  Admitting Hospitalist    Emergency Contact:

## 2021-04-13 NOTE — PROGRESS NOTES
0700: Report received; assumed care of patient. Dr. Talon Washington at bedside for rounds. See new orders. 0730: Am assessment and meds given per flowsheet/mar. Patient on heated high flow at this time. Patient requesting to get oob to bsc. Advised with her oxygen requirements we cannot let her get up. Patient upset and wanting me to ask the drJuan Cline: Dr. [facility] at bedside speaking with patient about oxygen and BSC. If oxygen gets down to about 70% she is allowed oob.   0900: Patient used bedpan and had a watery stool; bed bath given, complete linen changed. 0930: Patient called for bedpan again; IVF changed and IV antibiotic up at this time. Awaiting Remdesivir. 1030: Patient resting at this time. No changes. 1130: Reassessed no changes noted. 1230: IV Remdesivir completed. Patient used bedpan again. Gave patient her phone,  and belongings that her  dropped off for her. 1400: Patient c/o being hot. Gown removed and patient requested cold washcloth for head and chest. Will monitor. 1530: No changes at this time. 1630: Patient on bedpan. Desats when she is turned and repositioned. Patient is short of breath at rest.   1730: New IV placed left upper arm. Patient given dinner tray, very poor appetite. Dr. Stefania Rey here for rounds. 1830: Patient resting with eyes closed. Call light in reach. Bed alarm engaged. 1915: Report given to [place].  Electronically signed by Charissa Valdez RN on 4/13/2021 at 7:09 PM

## 2021-04-13 NOTE — PROGRESS NOTES
Pt arrived from ER via wheelchair on NRB, able to transfer from wc to bed with minimal assistance, pt sob with exertion. Pt alert and oriented x4. Pt has insulin pump. Dr Lieutenant Sanchez in to see pt, orders received. Bed in lowest position, wheels locked, call light within reach, will continue to monitor.

## 2021-04-14 LAB
ALBUMIN SERPL-MCNC: 3.3 G/DL (ref 3.5–4.6)
ALP BLD-CCNC: 139 U/L (ref 40–130)
ALT SERPL-CCNC: 40 U/L (ref 0–33)
ANION GAP SERPL CALCULATED.3IONS-SCNC: 12 MEQ/L (ref 9–15)
AST SERPL-CCNC: 50 U/L (ref 0–35)
BASOPHILS ABSOLUTE: 0 K/UL (ref 0–0.2)
BASOPHILS RELATIVE PERCENT: 0.1 %
BILIRUB SERPL-MCNC: 0.3 MG/DL (ref 0.2–0.7)
BUN BLDV-MCNC: 29 MG/DL (ref 6–20)
C-REACTIVE PROTEIN: 99.8 MG/L (ref 0–5)
CALCIUM SERPL-MCNC: 8.9 MG/DL (ref 8.5–9.9)
CHLORIDE BLD-SCNC: 103 MEQ/L (ref 95–107)
CO2: 23 MEQ/L (ref 20–31)
CREAT SERPL-MCNC: 1.04 MG/DL (ref 0.5–0.9)
D DIMER: 0.79 MG/L FEU (ref 0–0.5)
EOSINOPHILS ABSOLUTE: 0 K/UL (ref 0–0.7)
EOSINOPHILS RELATIVE PERCENT: 0 %
GFR AFRICAN AMERICAN: >60
GFR NON-AFRICAN AMERICAN: 56
GLOBULIN: 4.4 G/DL (ref 2.3–3.5)
GLUCOSE BLD-MCNC: 266 MG/DL (ref 70–99)
GLUCOSE BLD-MCNC: 268 MG/DL (ref 60–115)
GLUCOSE BLD-MCNC: 272 MG/DL (ref 60–115)
GLUCOSE BLD-MCNC: 273 MG/DL (ref 60–115)
GLUCOSE BLD-MCNC: 279 MG/DL (ref 60–115)
HAV IGM SER IA-ACNC: NORMAL
HCT VFR BLD CALC: 37.5 % (ref 37–47)
HEMOGLOBIN: 12.1 G/DL (ref 12–16)
HEPATITIS B CORE IGM ANTIBODY: NORMAL
HEPATITIS B SURFACE ANTIGEN INTERPRETATION: NORMAL
HEPATITIS C ANTIBODY INTERPRETATION: NORMAL
HEPATITIS INTERPRETATION:: NORMAL
LYMPHOCYTES ABSOLUTE: 0.7 K/UL (ref 1–4.8)
LYMPHOCYTES RELATIVE PERCENT: 7.3 %
MCH RBC QN AUTO: 23.6 PG (ref 27–31.3)
MCHC RBC AUTO-ENTMCNC: 32.2 % (ref 33–37)
MCV RBC AUTO: 73.4 FL (ref 82–100)
MONOCYTES ABSOLUTE: 0.4 K/UL (ref 0.2–0.8)
MONOCYTES RELATIVE PERCENT: 4 %
NEUTROPHILS ABSOLUTE: 9 K/UL (ref 1.4–6.5)
NEUTROPHILS RELATIVE PERCENT: 88.6 %
PDW BLD-RTO: 17.4 % (ref 11.5–14.5)
PERFORMED ON: ABNORMAL
PLATELET # BLD: 315 K/UL (ref 130–400)
POTASSIUM REFLEX MAGNESIUM: 5 MEQ/L (ref 3.4–4.9)
RBC # BLD: 5.11 M/UL (ref 4.2–5.4)
SODIUM BLD-SCNC: 138 MEQ/L (ref 135–144)
TOTAL PROTEIN: 7.7 G/DL (ref 6.3–8)
WBC # BLD: 10.2 K/UL (ref 4.8–10.8)

## 2021-04-14 PROCEDURE — 99291 CRITICAL CARE FIRST HOUR: CPT | Performed by: INTERNAL MEDICINE

## 2021-04-14 PROCEDURE — 6360000002 HC RX W HCPCS: Performed by: INTERNAL MEDICINE

## 2021-04-14 PROCEDURE — 85379 FIBRIN DEGRADATION QUANT: CPT

## 2021-04-14 PROCEDURE — 2500000003 HC RX 250 WO HCPCS: Performed by: INTERNAL MEDICINE

## 2021-04-14 PROCEDURE — 36415 COLL VENOUS BLD VENIPUNCTURE: CPT

## 2021-04-14 PROCEDURE — 2000000000 HC ICU R&B

## 2021-04-14 PROCEDURE — 6370000000 HC RX 637 (ALT 250 FOR IP): Performed by: INTERNAL MEDICINE

## 2021-04-14 PROCEDURE — 2580000003 HC RX 258: Performed by: INTERNAL MEDICINE

## 2021-04-14 PROCEDURE — 99232 SBSQ HOSP IP/OBS MODERATE 35: CPT | Performed by: INTERNAL MEDICINE

## 2021-04-14 PROCEDURE — 85025 COMPLETE CBC W/AUTO DIFF WBC: CPT

## 2021-04-14 PROCEDURE — 94761 N-INVAS EAR/PLS OXIMETRY MLT: CPT

## 2021-04-14 PROCEDURE — 80074 ACUTE HEPATITIS PANEL: CPT

## 2021-04-14 PROCEDURE — 80053 COMPREHEN METABOLIC PANEL: CPT

## 2021-04-14 PROCEDURE — 86140 C-REACTIVE PROTEIN: CPT

## 2021-04-14 PROCEDURE — 2700000000 HC OXYGEN THERAPY PER DAY

## 2021-04-14 RX ORDER — TRAMADOL HYDROCHLORIDE 50 MG/1
100 TABLET ORAL EVERY 12 HOURS
Status: DISCONTINUED | OUTPATIENT
Start: 2021-04-14 | End: 2021-04-20 | Stop reason: HOSPADM

## 2021-04-14 RX ORDER — INSULIN GLARGINE 100 [IU]/ML
60 INJECTION, SOLUTION SUBCUTANEOUS NIGHTLY
Status: DISCONTINUED | OUTPATIENT
Start: 2021-04-14 | End: 2021-04-15

## 2021-04-14 RX ADMIN — INSULIN LISPRO 9 UNITS: 100 INJECTION, SOLUTION INTRAVENOUS; SUBCUTANEOUS at 18:56

## 2021-04-14 RX ADMIN — INSULIN LISPRO 9 UNITS: 100 INJECTION, SOLUTION INTRAVENOUS; SUBCUTANEOUS at 07:11

## 2021-04-14 RX ADMIN — INSULIN GLARGINE 60 UNITS: 100 INJECTION, SOLUTION SUBCUTANEOUS at 22:17

## 2021-04-14 RX ADMIN — GUAIFENESIN 200 MG: 100 SOLUTION ORAL at 05:50

## 2021-04-14 RX ADMIN — GUAIFENESIN 200 MG: 100 SOLUTION ORAL at 18:53

## 2021-04-14 RX ADMIN — OYSTER SHELL CALCIUM WITH VITAMIN D 1 TABLET: 500; 200 TABLET, FILM COATED ORAL at 07:06

## 2021-04-14 RX ADMIN — TRAMADOL HYDROCHLORIDE 50 MG: 50 TABLET, FILM COATED ORAL at 05:49

## 2021-04-14 RX ADMIN — PANTOPRAZOLE SODIUM 40 MG: 40 TABLET, DELAYED RELEASE ORAL at 05:49

## 2021-04-14 RX ADMIN — REMDESIVIR 100 MG: 100 INJECTION, POWDER, LYOPHILIZED, FOR SOLUTION INTRAVENOUS at 11:51

## 2021-04-14 RX ADMIN — DEXAMETHASONE SODIUM PHOSPHATE 6 MG: 4 INJECTION, SOLUTION INTRA-ARTICULAR; INTRALESIONAL; INTRAMUSCULAR; INTRAVENOUS; SOFT TISSUE at 07:06

## 2021-04-14 RX ADMIN — LEVOTHYROXINE SODIUM 150 MCG: 0.07 TABLET ORAL at 05:49

## 2021-04-14 RX ADMIN — PREGABALIN 75 MG: 75 CAPSULE ORAL at 07:06

## 2021-04-14 RX ADMIN — ENOXAPARIN SODIUM 80 MG: 80 INJECTION SUBCUTANEOUS at 07:05

## 2021-04-14 RX ADMIN — TRAMADOL HYDROCHLORIDE 100 MG: 50 TABLET ORAL at 18:39

## 2021-04-14 RX ADMIN — QUETIAPINE FUMARATE 50 MG: 50 TABLET ORAL at 22:15

## 2021-04-14 RX ADMIN — PREGABALIN 75 MG: 75 CAPSULE ORAL at 22:15

## 2021-04-14 RX ADMIN — INSULIN LISPRO 9 UNITS: 100 INJECTION, SOLUTION INTRAVENOUS; SUBCUTANEOUS at 12:00

## 2021-04-14 RX ADMIN — QUETIAPINE FUMARATE 50 MG: 50 TABLET ORAL at 07:06

## 2021-04-14 RX ADMIN — ENOXAPARIN SODIUM 80 MG: 80 INJECTION SUBCUTANEOUS at 22:15

## 2021-04-14 ASSESSMENT — ENCOUNTER SYMPTOMS
COUGH: 1
SHORTNESS OF BREATH: 1
GASTROINTESTINAL NEGATIVE: 1

## 2021-04-14 ASSESSMENT — PAIN SCALES - GENERAL: PAINLEVEL_OUTOF10: 6

## 2021-04-14 NOTE — PROGRESS NOTES
Infectious Disease     Patient Name: Candy Orr  Date: 4/14/2021  YOB: 1970  Medical Record Number: [id number]        COVID-19 pneumonia      CTshowed lateral groundglass infiltrate  Procalcitonin normal    Currently on high flow oxygen 50 L a minute satting 95%      Review of Systems   Constitutional: Positive for diaphoresis and fatigue. Negative for chills and fever. Respiratory: Positive for cough and shortness of breath. Cardiovascular: Negative. Gastrointestinal: Negative. Physical Exam   Constitutional: She appears distressed. Morbidly obese   Cardiovascular: Normal heart sounds. No murmur heard. Pulmonary/Chest: Effort normal. No respiratory distress. She has no wheezes. She has no rales. She exhibits no tenderness. Abdominal: Soft. She exhibits no distension and no mass. There is no abdominal tenderness. There is no rebound and no guarding. Blood pressure (!) 156/57, pulse 94, temperature 98.2 °F (36.8 °C), temperature source Oral, resp. rate 27, height 5' 6\" (1.676 m), weight (!) 354 lb 15.1 oz (161 kg), SpO2 (!) 89 %, not currently breastfeeding.       .   Lab Results   Component Value Date    WBC 10.2 04/14/2021    HGB 12.1 04/14/2021    HCT 37.5 04/14/2021    MCV 73.4 (L) 04/14/2021     04/14/2021     Lab Results   Component Value Date     04/14/2021    K 5.0 04/14/2021     04/14/2021    CO2 23 04/14/2021    BUN 29 04/14/2021    CREATININE 1.04 04/14/2021    GLUCOSE 266 04/14/2021    CALCIUM 8.9 04/14/2021            PLAN:    COVID-19 pneumonia    Patient was placed on dexamethasone remdesivir  Also  on Zithromax    Add Actemra per IDSA guidelines    struggling but has been able to remain off ventilator

## 2021-04-14 NOTE — PROGRESS NOTES
Pulmonary & Critical Care Medicine ICU Progress Note    Subjective:     She is currently resting comfortably in bed. She states her breathing does feel better today. She does remain on Airvo. Her main complaint today is secondary to being hot.     EXAM:  General: No acute distress, resting comfortably in bed  HEENT: Normocephalic, atraumatic, Airvo in place  Lungs : Crackles bilaterally, no wheezes, no respiratory distress  Heart: Regular rate and rhythm  ABD: Obese, positive bowel sounds, soft, nontender to palpation  Extremities : Warm, dry  Neuro: Alert and oriented x4, no focal motor or sensory deficits appreciated  Skin: No rashes     Recent Labs     04/12/21 2200   PHART 7.410   [id number] 35   PO2ART 85*         IV:   dextrose         Vitals:  BP (!) 156/57   Pulse 94   Temp 98.2 °F (36.8 °C) (Oral)   Resp 27   Ht 5' 6\" (1.676 m)   Wt (!) 354 lb 15.1 oz (161 kg)   SpO2 (!) 89%   BMI 57.29 kg/m²          Intake/Output Summary (Last 24 hours) at 4/14/2021 1248  Last data filed at 4/14/2021 0800  Gross per 24 hour   Intake 2767 ml   Output 1900 ml   Net 867 ml       Medications:  Scheduled Meds:   insulin lispro  5 Units Subcutaneous TID WC    traMADol  100 mg Oral Q12H    insulin glargine  60 Units Subcutaneous Nightly    pregabalin  75 mg Oral BID    insulin lispro  0-18 Units Subcutaneous TID WC    insulin lispro  0-9 Units Subcutaneous Nightly    remdesivir IVPB  100 mg Intravenous Q24H    enoxaparin  0.5 mg/kg Subcutaneous BID    dexamethasone  6 mg Intravenous Q24H    pantoprazole  40 mg Oral QAM AC    levothyroxine  150 mcg Oral Daily    calcium-vitamin D  1 tablet Oral Daily    QUEtiapine  50 mg Oral BID       Labs:   CBC:   Recent Labs     04/12/21  1917 04/12/21  2200 04/13/21  0545 04/14/21  0556   WBC 6.1  --  5.2 10.2   HGB 11.3* 13.1 11.4* 12.1   HCT 35.3*  --  35.4* 37.5   MCV 73.1*  --  73.1* 73.4*     --  230 315     BMP:   Recent Labs     04/12/21  1900 04/12/21  1900 04/12/21  2200 04/13/21  0545 04/14/21  0554   *  --   --  134* 138   K 4.4  --   --  5.3* 5.0*   CL 98  --   --  99 103   CO2 22  --   --  21 23   BUN 29*  --   --  29* 29*   CREATININE 1.38*   < > 1.4* 1.25* 1.04*    < > = values in this interval not displayed. LIVER PROFILE:   Recent Labs     04/12/21 1900 04/13/21  0545 04/14/21  0554   AST 65* 58* 50*   ALT 40* 41* 40*   BILITOT 0.4 0.3 0.3   ALKPHOS 132* 140* 139*     PT/INR:   Recent Labs     04/13/21 0545   PROTIME 13.3   INR 1.0     APTT:   Recent Labs     04/13/21 0545   APTT 32.0     UA:No results for input(s): NITRITE, COLORU, PHUR, LABCAST, WBCUA, RBCUA, MUCUS, TRICHOMONAS, YEAST, BACTERIA, CLARITYU, SPECGRAV, LEUKOCYTESUR, UROBILINOGEN, BILIRUBINUR, BLOODU, GLUCOSEU, AMORPHOUS in the last 72 hours. Invalid input(s): KETONESU      Assessment/Plan:    1. Acute hypoxic respiratory failure-this secondary to COVID-19 pneumonitis. She is currently on remdesivir and Decadron. She was evaluated by infectious disease yesterday. She does remain on Airvo for her oxygenation. Her FiO2 has been able to be decreased some today. 2. COVID-19 pneumonitis-she is currently receiving treatment with remdesivir and Decadron. She did also receive Actemra. ID does continue to follow as well. 3. DM - Lantus and SSI    4. Elevated d-dimer - this is secondary to COVID. US of UE and LE all were negative. She remains on Lovenox 0.5 mg/kg twice daily. Nutrition: Oral diet    Prophylaxis: Lovenox and Protonix    Code Status: Full    This is a 48 y.o. female who is critically ill secondary to COVID and respiratory failure. I have spent a total of 32 minutes of critical care time with this patient, review of the chart, and discussion with the bedside staff; excluding any billable procedures.     Electronically signed by Winifred Flores DO on 4/14/2021 at 12:49 PM

## 2021-04-14 NOTE — PROGRESS NOTES
PHARMACY NOTE:   ICU Rounds Attended (10-15 minutes in patient room):    Pt diagnosis: COVID    IV Fluids: none   Renal:   Recent Labs     04/12/21 2200 04/13/21  0545 04/14/21  0554   CREATININE 1.4* 1.25* 1.04*    Estimated Creatinine Clearance: 102 mL/min (A) (based on SCr of 1.04 mg/dL (H)). Antimicrobial Therapy:   Day 2   Antimicrobial agents: remdesivir  Cultures no cultures   ID on consult: yes    Recent Labs     04/12/21 1917 04/13/21  0545 04/14/21  0556   WBC 6.1 5.2 10.2       Insulin Therapy (goal: 140-180): high SSI  39 units given past 24 hours   Lantus 40   Recent Labs     04/12/21 1900 04/13/21  0545 04/14/21  0554   GLUCOSE 177* 319* 266*       Steroid Therapy: decadron 6mg  Stress Ulcer Prophylaxis:   Famotidine BID  On at home: protonix 40mg    DVT Prophylaxis/Anticoagulant Therapy: lovenox 80mg BID  Recent Labs     04/12/21 1917 04/13/21  0545 04/14/21  0556    230 315     Recent Labs     04/13/21  0545   INR 1.0       Bowel Regimen:   Miralax PRN    IV to PO: evaluate with pt status   Diet (NPO, tube feeds, TPN): cardiac diet     Follow up/Changes: Follow for need for blood cultures based on pt labs/clinical status. No labs drawn increase in WBC since admission. Tramadol ordered scheduled and PRN recommend adjustment in order to prevent improper dosing for patient. Recommend adjustment in lantus dosing with patients SSI coverage needed. Potassium elevated monitor.      Charleen Essex, PharmD   4/14/2021 11:51 AM

## 2021-04-14 NOTE — PROGRESS NOTES
Patient is feeling better. Patient continues to be on high flow oxygen at 90%. Persistent cough. No abdominal pain. No nausea or vomiting. No headaches, loss of consciousness or seizure. Exam: Awake, alert oriented, in mild respiratory distress on high flow oxygen. The patient is morbidly obese. HEENT: Pale conjunctiva and buccal mucosa. Normal and throat and nose  Neck: Supple, no nuchal rigidity. Endo: No thyromegaly. Vascular: No JVD or carotid bruit. Chest: Diminished breath sounds and soft crackles. Heart: Regular rate and rhythm, no extra sounds. No murmur, no rub. Abdomen: Soft, no tenderness, no rebound, no rigidity. Increased abdominal girth therefore clinically I could not exclude the possibility of intra-abdominal mass or organomegaly. LE: No cyanosis or clubbing, no varices or edema. Neuro: Awake, alert, oriented, normal speech, normal comprehension, normal extension, normal cranial nerves, normal and symmetrical motor and tone examination throughout. MS: No joint effusion or tenderness. Skin: No skin rash, itching, bruising or significant findings.     Assessment and plan:      *Hypoxic respiratory failure.     *Bilateral infiltrates highly suggestive of a COVID-19 pneumonia.     *Diabetes with hyperglycemia. Better control.     *Morbid obesity.     *LEI, improving. Discontinued IV fluid.     *Anemia, no evidence of acute blood loss. *Elevated liver enzymes. Patient probably has fatty liver disease. I cannot exclude other possible etiologies such as viral, autoimmune, infiltrative and/or neoplastic liver disease. *Hypertension. Started the patient on amlodipine.     Plan:  Continue current treatment plan as guided by infectious disease and the pulmonologist.  DVT prophylaxis is addressed by intensivist.    Zeeshan Paz with a sliding scale coverage. Blood sugar is under fair control. Started the patient on amlodipine.     Elevated liver enzymes, outpatient investigation. Check viral hepatitis panel. No further rise of the liver enzymes to justify discontinuation of remdesivir.

## 2021-04-15 LAB
ALBUMIN SERPL-MCNC: 3 G/DL (ref 3.5–4.6)
ALP BLD-CCNC: 119 U/L (ref 40–130)
ALT SERPL-CCNC: 41 U/L (ref 0–33)
ANION GAP SERPL CALCULATED.3IONS-SCNC: 14 MEQ/L (ref 9–15)
AST SERPL-CCNC: 52 U/L (ref 0–35)
BASOPHILS ABSOLUTE: 0 K/UL (ref 0–0.2)
BASOPHILS RELATIVE PERCENT: 0.2 %
BILIRUB SERPL-MCNC: 0.4 MG/DL (ref 0.2–0.7)
BILIRUBIN URINE: ABNORMAL
BLOOD, URINE: ABNORMAL
BUN BLDV-MCNC: 27 MG/DL (ref 6–20)
C-REACTIVE PROTEIN: 59 MG/L (ref 0–5)
CALCIUM SERPL-MCNC: 8.6 MG/DL (ref 8.5–9.9)
CHLORIDE BLD-SCNC: 105 MEQ/L (ref 95–107)
CLARITY: ABNORMAL
CO2: 22 MEQ/L (ref 20–31)
COLOR: ABNORMAL
CREAT SERPL-MCNC: 1 MG/DL (ref 0.5–0.9)
D DIMER: 0.72 MG/L FEU (ref 0–0.5)
EOSINOPHILS ABSOLUTE: 0 K/UL (ref 0–0.7)
EOSINOPHILS RELATIVE PERCENT: 0 %
GFR AFRICAN AMERICAN: >60
GFR NON-AFRICAN AMERICAN: 58.6
GLOBULIN: 3.9 G/DL (ref 2.3–3.5)
GLUCOSE BLD-MCNC: 150 MG/DL (ref 60–115)
GLUCOSE BLD-MCNC: 187 MG/DL (ref 60–115)
GLUCOSE BLD-MCNC: 192 MG/DL (ref 60–115)
GLUCOSE BLD-MCNC: 266 MG/DL (ref 70–99)
GLUCOSE URINE: NEGATIVE MG/DL
HCT VFR BLD CALC: 36.5 % (ref 37–47)
HEMOGLOBIN: 11.7 G/DL (ref 12–16)
KETONES, URINE: ABNORMAL MG/DL
LEUKOCYTE ESTERASE, URINE: ABNORMAL
LYMPHOCYTES ABSOLUTE: 0.7 K/UL (ref 1–4.8)
LYMPHOCYTES RELATIVE PERCENT: 8.3 %
MCH RBC QN AUTO: 23.6 PG (ref 27–31.3)
MCHC RBC AUTO-ENTMCNC: 32 % (ref 33–37)
MCV RBC AUTO: 73.7 FL (ref 82–100)
MONOCYTES ABSOLUTE: 0.6 K/UL (ref 0.2–0.8)
MONOCYTES RELATIVE PERCENT: 6.7 %
NEUTROPHILS ABSOLUTE: 7.2 K/UL (ref 1.4–6.5)
NEUTROPHILS RELATIVE PERCENT: 84.8 %
NITRITE, URINE: POSITIVE
PDW BLD-RTO: 17.7 % (ref 11.5–14.5)
PERFORMED ON: ABNORMAL
PH UA: 6.5 (ref 5–9)
PLATELET # BLD: 348 K/UL (ref 130–400)
POTASSIUM REFLEX MAGNESIUM: 4.5 MEQ/L (ref 3.4–4.9)
PROCALCITONIN: 0.08 NG/ML (ref 0–0.15)
PROTEIN UA: 100 MG/DL
RBC # BLD: 4.95 M/UL (ref 4.2–5.4)
RBC UA: >100 /HPF (ref 0–2)
SODIUM BLD-SCNC: 141 MEQ/L (ref 135–144)
SPECIFIC GRAVITY UA: 1.03 (ref 1–1.03)
TOTAL PROTEIN: 6.9 G/DL (ref 6.3–8)
URINE REFLEX TO CULTURE: ABNORMAL
UROBILINOGEN, URINE: 0.2 E.U./DL
WBC # BLD: 8.5 K/UL (ref 4.8–10.8)
WBC UA: ABNORMAL /HPF (ref 0–5)

## 2021-04-15 PROCEDURE — 94760 N-INVAS EAR/PLS OXIMETRY 1: CPT

## 2021-04-15 PROCEDURE — 99232 SBSQ HOSP IP/OBS MODERATE 35: CPT | Performed by: INTERNAL MEDICINE

## 2021-04-15 PROCEDURE — 2000000000 HC ICU R&B

## 2021-04-15 PROCEDURE — 6360000002 HC RX W HCPCS: Performed by: INTERNAL MEDICINE

## 2021-04-15 PROCEDURE — 80053 COMPREHEN METABOLIC PANEL: CPT

## 2021-04-15 PROCEDURE — 2700000000 HC OXYGEN THERAPY PER DAY

## 2021-04-15 PROCEDURE — 6370000000 HC RX 637 (ALT 250 FOR IP): Performed by: INTERNAL MEDICINE

## 2021-04-15 PROCEDURE — 81001 URINALYSIS AUTO W/SCOPE: CPT

## 2021-04-15 PROCEDURE — 94761 N-INVAS EAR/PLS OXIMETRY MLT: CPT

## 2021-04-15 PROCEDURE — 2500000003 HC RX 250 WO HCPCS: Performed by: INTERNAL MEDICINE

## 2021-04-15 PROCEDURE — 2580000003 HC RX 258: Performed by: INTERNAL MEDICINE

## 2021-04-15 PROCEDURE — 84145 PROCALCITONIN (PCT): CPT

## 2021-04-15 PROCEDURE — 99291 CRITICAL CARE FIRST HOUR: CPT | Performed by: INTERNAL MEDICINE

## 2021-04-15 PROCEDURE — 86140 C-REACTIVE PROTEIN: CPT

## 2021-04-15 PROCEDURE — 36415 COLL VENOUS BLD VENIPUNCTURE: CPT

## 2021-04-15 PROCEDURE — 85379 FIBRIN DEGRADATION QUANT: CPT

## 2021-04-15 PROCEDURE — 85025 COMPLETE CBC W/AUTO DIFF WBC: CPT

## 2021-04-15 RX ORDER — LISINOPRIL 10 MG/1
10 TABLET ORAL 2 TIMES DAILY
Status: DISCONTINUED | OUTPATIENT
Start: 2021-04-15 | End: 2021-04-17

## 2021-04-15 RX ORDER — INSULIN GLARGINE 100 [IU]/ML
10 INJECTION, SOLUTION SUBCUTANEOUS ONCE
Status: COMPLETED | OUTPATIENT
Start: 2021-04-15 | End: 2021-04-15

## 2021-04-15 RX ORDER — INSULIN GLARGINE 100 [IU]/ML
70 INJECTION, SOLUTION SUBCUTANEOUS NIGHTLY
Status: DISCONTINUED | OUTPATIENT
Start: 2021-04-15 | End: 2021-04-20 | Stop reason: HOSPADM

## 2021-04-15 RX ADMIN — OYSTER SHELL CALCIUM WITH VITAMIN D 1 TABLET: 500; 200 TABLET, FILM COATED ORAL at 07:50

## 2021-04-15 RX ADMIN — QUETIAPINE FUMARATE 50 MG: 50 TABLET ORAL at 07:49

## 2021-04-15 RX ADMIN — TRAMADOL HYDROCHLORIDE 100 MG: 50 TABLET ORAL at 00:02

## 2021-04-15 RX ADMIN — LISINOPRIL 10 MG: 10 TABLET ORAL at 07:49

## 2021-04-15 RX ADMIN — INSULIN LISPRO 3 UNITS: 100 INJECTION, SOLUTION INTRAVENOUS; SUBCUTANEOUS at 16:25

## 2021-04-15 RX ADMIN — PANTOPRAZOLE SODIUM 40 MG: 40 TABLET, DELAYED RELEASE ORAL at 05:59

## 2021-04-15 RX ADMIN — TRAMADOL HYDROCHLORIDE 100 MG: 50 TABLET ORAL at 17:35

## 2021-04-15 RX ADMIN — TRAMADOL HYDROCHLORIDE 100 MG: 50 TABLET ORAL at 05:59

## 2021-04-15 RX ADMIN — LISINOPRIL 10 MG: 10 TABLET ORAL at 20:46

## 2021-04-15 RX ADMIN — QUETIAPINE FUMARATE 50 MG: 50 TABLET ORAL at 20:46

## 2021-04-15 RX ADMIN — GUAIFENESIN 200 MG: 100 SOLUTION ORAL at 20:56

## 2021-04-15 RX ADMIN — INSULIN LISPRO 9 UNITS: 100 INJECTION, SOLUTION INTRAVENOUS; SUBCUTANEOUS at 07:28

## 2021-04-15 RX ADMIN — INSULIN LISPRO 3 UNITS: 100 INJECTION, SOLUTION INTRAVENOUS; SUBCUTANEOUS at 12:06

## 2021-04-15 RX ADMIN — GUAIFENESIN 200 MG: 100 SOLUTION ORAL at 00:09

## 2021-04-15 RX ADMIN — GUAIFENESIN 200 MG: 100 SOLUTION ORAL at 07:48

## 2021-04-15 RX ADMIN — INSULIN GLARGINE 10 UNITS: 100 INJECTION, SOLUTION SUBCUTANEOUS at 10:01

## 2021-04-15 RX ADMIN — PREGABALIN 75 MG: 75 CAPSULE ORAL at 07:49

## 2021-04-15 RX ADMIN — ENOXAPARIN SODIUM 80 MG: 80 INJECTION SUBCUTANEOUS at 20:46

## 2021-04-15 RX ADMIN — LEVOTHYROXINE SODIUM 150 MCG: 0.07 TABLET ORAL at 05:59

## 2021-04-15 RX ADMIN — ENOXAPARIN SODIUM 80 MG: 80 INJECTION SUBCUTANEOUS at 07:48

## 2021-04-15 RX ADMIN — REMDESIVIR 100 MG: 100 INJECTION, POWDER, LYOPHILIZED, FOR SOLUTION INTRAVENOUS at 10:00

## 2021-04-15 RX ADMIN — PREGABALIN 75 MG: 75 CAPSULE ORAL at 20:46

## 2021-04-15 RX ADMIN — GUAIFENESIN 200 MG: 100 SOLUTION ORAL at 12:06

## 2021-04-15 RX ADMIN — GUAIFENESIN 200 MG: 100 SOLUTION ORAL at 16:24

## 2021-04-15 RX ADMIN — DEXAMETHASONE SODIUM PHOSPHATE 6 MG: 4 INJECTION, SOLUTION INTRA-ARTICULAR; INTRALESIONAL; INTRAMUSCULAR; INTRAVENOUS; SOFT TISSUE at 07:49

## 2021-04-15 ASSESSMENT — PAIN DESCRIPTION - LOCATION: LOCATION: BACK

## 2021-04-15 ASSESSMENT — PAIN DESCRIPTION - ORIENTATION: ORIENTATION: LOWER

## 2021-04-15 ASSESSMENT — PAIN SCALES - GENERAL
PAINLEVEL_OUTOF10: 8
PAINLEVEL_OUTOF10: 0
PAINLEVEL_OUTOF10: 0
PAINLEVEL_OUTOF10: 3

## 2021-04-15 ASSESSMENT — ENCOUNTER SYMPTOMS
SHORTNESS OF BREATH: 1
GASTROINTESTINAL NEGATIVE: 1
COUGH: 1

## 2021-04-15 ASSESSMENT — PAIN DESCRIPTION - ONSET: ONSET: ON-GOING

## 2021-04-15 ASSESSMENT — PAIN - FUNCTIONAL ASSESSMENT: PAIN_FUNCTIONAL_ASSESSMENT: PREVENTS OR INTERFERES SOME ACTIVE ACTIVITIES AND ADLS

## 2021-04-15 ASSESSMENT — PAIN DESCRIPTION - DESCRIPTORS: DESCRIPTORS: ACHING;DISCOMFORT

## 2021-04-15 NOTE — PROGRESS NOTES
Pt had uneventful shift. Assessments completed (see flowsheets). Pt tolerating heat highflow well. Handoff report given to oncoming RN. Safety measures in place.

## 2021-04-15 NOTE — PROGRESS NOTES
Infectious Disease     Patient Name: Faisal Ugarte  Date: 4/15/2021  YOB: 1970  Medical Record Number: [id number]        COVID-19 pneumonia      CTshowed lateral groundglass infiltrate  Procalcitonin normal    Currently on high flow oxygen 50 L a minute satting 92%      Review of Systems   Constitutional: Negative for chills, diaphoresis, fatigue and fever. Respiratory: Positive for cough and shortness of breath. Cardiovascular: Negative. Gastrointestinal: Negative. Physical Exam   Constitutional: She appears distressed. Morbidly obese   Cardiovascular: Normal heart sounds. No murmur heard. Pulmonary/Chest: Effort normal. No respiratory distress. She has no wheezes. She has no rales. She exhibits no tenderness. Abdominal: Soft. She exhibits no distension and no mass. There is no abdominal tenderness. There is no rebound and no guarding. Blood pressure (!) 173/85, pulse 79, temperature 98.4 °F (36.9 °C), temperature source Axillary, resp. rate 22, height 5' 6\" (1.676 m), weight (!) 342 lb 9.5 oz (155.4 kg), SpO2 92 %, not currently breastfeeding.       .   Lab Results   Component Value Date    WBC 8.5 04/15/2021    HGB 11.7 (L) 04/15/2021    HCT 36.5 (L) 04/15/2021    MCV 73.7 (L) 04/15/2021     04/15/2021     Lab Results   Component Value Date     04/15/2021    K 4.5 04/15/2021     04/15/2021    CO2 22 04/15/2021    BUN 27 04/15/2021    CREATININE 1.00 04/15/2021    GLUCOSE 266 04/15/2021    CALCIUM 8.6 04/15/2021            PLAN:    COVID-19 pneumonia   dexamethasone remdesivir   Actemra

## 2021-04-15 NOTE — PROGRESS NOTES
Patient feels better. No worsening condition overnight. No chest pain or palpitation. No abdominal pain no, nausea or vomiting. Poor appetite. No seizure or convulsion. Exam: Awake, alert oriented, in mild respiratory distress on high flow oxygen.  The patient is morbidly obese. HEENT: Pale conjunctiva and buccal mucosa.  Normal and throat and nose  Neck: Supple, no nuchal rigidity. Endo: No thyromegaly. Vascular: No JVD or carotid bruit. Chest: Diminished breath sounds and soft crackles. Heart: Regular rate and rhythm, no extra sounds.  No murmur, no rub. Abdomen: Soft, no tenderness, no rebound, no rigidity.  Increased abdominal girth therefore clinically I could not exclude the possibility of intra-abdominal mass or organomegaly. LE: No cyanosis or clubbing, no varices or edema. Neuro: Awake, alert, oriented, normal speech, normal comprehension, normal extension, normal cranial nerves, normal and symmetrical motor and tone examination throughout. MS: No joint effusion or tenderness. Skin: No skin rash, itching, bruising or significant findings.     Assessment and plan:      *Hypoxic respiratory failure.     *Bilateral infiltrates highly suggestive of a COVID-19 pneumonia.     *Diabetes with hyperglycemia. Better but not optimal control.     *Morbid obesity.     *LEI, resolved.     *Anemia, no evidence of acute blood loss. Stable.     *Elevated liver enzymes. Patient probably has fatty liver disease. I cannot exclude other possible etiologies such as viral, autoimmune, infiltrative and/or neoplastic liver disease. Hepatitis panel came back negative.     *Hypertension. Started the patient on amlodipine.     Plan:  Continue current treatment plan for her Covid and the respiratory failure as guided by infectious disease and the pulmonologist.  DVT prophylaxis is addressed by intensivist.     Continue Accu-Chek with a sliding scale coverage.   I increase the dose of her Lantus to achieve better control.     Started the patient on lisinopril.     Elevated liver enzymes, outpatient investigation rule out nonalcoholic steatohepatitis, autoimmune, infiltrative, neoplastic liver disease. No further rise of the liver enzymes to justify discontinuation of remdesivir.

## 2021-04-15 NOTE — PROGRESS NOTES
PHARMACY NOTE:   ICU Rounds Attended (10-15 minutes in patient room):      Renal:   Recent Labs     04/13/21  0545 04/14/21  0554 04/15/21  0610   CREATININE 1.25* 1.04* 1.00*    Estimated Creatinine Clearance: 104 mL/min (A) (based on SCr of 1 mg/dL (H)). Antimicrobial Therapy:   Day 2   Antimicrobial agents: remdesivir    Recent Labs     04/12/21  1917 04/13/21  0545 04/14/21  0556 04/15/21  0610   WBC 6.1 5.2 10.2 8.5          Insulin Therapy (goal: 140-180): high   42 units given past 24 hours   Lantus 60 increased to 70 QHS   Recent Labs     04/13/21  0545 04/14/21  0554 04/15/21  0610   GLUCOSE 319* 266* 266*       Steroid Therapy: decadron 6mg  Stress Ulcer Prophylaxis:   Pantoprazole 40       DVT Prophylaxis/Anticoagulant Therapy: lovenox 80mg BID  Recent Labs     04/13/21  0545 04/14/21  0556 04/15/21  0610    315 348     Recent Labs     04/13/21  0545   INR 1.0       Follow up/Changes:   Blood sugars remain high, attending increased lantus to 70 units nightly, follow for further recommended increases as appropriate. Lisinopril added for elevated BP follow for additional agents as appropriate.      Dino Olivo, PharmD   4/15/2021 10:44 AM

## 2021-04-15 NOTE — CARE COORDINATION
AM ROUNDS COMPLETED WITH ICU TEAM. PT REMAINS ON HHF O2 @60L. TO REMAIN IN ICU AT THIS TIME. WILL CONTINUE TO MONITOR FOR NEEDS. CM TO FOLLOW.

## 2021-04-15 NOTE — PROGRESS NOTES
Pulmonary & Critical Care Medicine ICU Progress Note    Subjective:     She denies any complaints this afternoon. She is resting comfortably in bed. She remains on Airvo.     EXAM:  General: No acute distress, resting comfortably in bed  HEENT: Normocephalic, atraumatic, Airvo in place  Lungs : Crackles bilaterally, no wheezes, no respiratory distress  Heart: Regular rate and rhythm  ABD: Obese, positive bowel sounds, soft, nontender to palpation  Extremities : Warm, dry  Neuro: Alert and oriented x4, no focal motor or sensory deficits appreciated  Skin: No rashes     Recent Labs     04/12/21  2200   PHART 7.410   [id number] 35   PO2ART 85*         IV:   dextrose         Vitals:  BP (!) 158/81   Pulse 85   Temp 98.4 °F (36.9 °C) (Axillary)   Resp 21   Ht 5' 6\" (1.676 m)   Wt (!) 342 lb 9.5 oz (155.4 kg)   SpO2 91%   BMI 55.30 kg/m²          Intake/Output Summary (Last 24 hours) at 4/15/2021 1252  Last data filed at 4/15/2021 0634  Gross per 24 hour   Intake 270 ml   Output 1400 ml   Net -1130 ml       Medications:  Scheduled Meds:   insulin glargine  70 Units Subcutaneous Nightly    lisinopril  10 mg Oral BID    insulin lispro  5 Units Subcutaneous TID WC    traMADol  100 mg Oral Q12H    pregabalin  75 mg Oral BID    insulin lispro  0-18 Units Subcutaneous TID WC    insulin lispro  0-9 Units Subcutaneous Nightly    remdesivir IVPB  100 mg Intravenous Q24H    enoxaparin  0.5 mg/kg Subcutaneous BID    dexamethasone  6 mg Intravenous Q24H    pantoprazole  40 mg Oral QAM AC    levothyroxine  150 mcg Oral Daily    calcium-vitamin D  1 tablet Oral Daily    QUEtiapine  50 mg Oral BID       Labs:   CBC:   Recent Labs     04/13/21  0545 04/14/21  0556 04/15/21  0610   WBC 5.2 10.2 8.5   HGB 11.4* 12.1 11.7*   HCT 35.4* 37.5 36.5*   MCV 73.1* 73.4* 73.7*    315 348     BMP:   Recent Labs     04/13/21  0545 04/14/21  0554 04/15/21  0610   * 138 141   K 5.3* 5.0* 4.5   CL 99 103 105   CO2 21 23 22   BUN 29* 29* 27*   CREATININE 1.25* 1.04* 1.00*     LIVER PROFILE:   Recent Labs     04/13/21  0545 04/14/21  0554 04/15/21  0610   AST 58* 50* 52*   ALT 41* 40* 41*   BILITOT 0.3 0.3 0.4   ALKPHOS 140* 139* 119     PT/INR:   Recent Labs     04/13/21  0545   PROTIME 13.3   INR 1.0     APTT:   Recent Labs     04/13/21  0545   APTT 32.0     UA:  Recent Labs     04/15/21  0645   COLORU RED*   PHUR 6.5   WBCUA 0-2   RBCUA >100*   CLARITYU TURBID*   SPECGRAV 1.026   LEUKOCYTESUR MODERATE*   UROBILINOGEN 0.2   BILIRUBINUR SMALL*   BLOODU MODERATE*   GLUCOSEU Negative         Assessment/Plan:    1. Acute hypoxic respiratory failure-this secondary to COVID-19 pneumonitis. She is currently on remdesivir and Decadron. Factious disease does continue to follow. She does remain on Airvo for her oxygenation. She is currently at 60 L and 90% FiO2.    2. COVID-19 pneumonitis-she is currently receiving treatment with remdesivir and Decadron. She did also receive Actemra. ID does continue to follow as well. 3. DM - Lantus and SSI    4. Elevated d-dimer - this is secondary to COVID. US of UE and LE all were negative. She remains on Lovenox 0.5 mg/kg twice daily. Nutrition: Oral diet    Prophylaxis: Lovenox and Protonix    Code Status: Full    This is a 48 y.o. female who is critically ill secondary to COVID and respiratory failure. I have spent a total of 31 minutes of critical care time with this patient, review of the chart, and discussion with the bedside staff; excluding any billable procedures.     Electronically signed by Jaydon León DO, on 4/15/2021 at 12:52 PM

## 2021-04-15 NOTE — PLAN OF CARE
Problem: Airway Clearance - Ineffective  Goal: Achieve or maintain patent airway  4/15/2021 0821 by Rona Perla RN  Outcome: Ongoing  4/15/2021 0638 by Radha Hampton RN  Outcome: Ongoing     Problem: Gas Exchange - Impaired  Goal: Absence of hypoxia  4/15/2021 0821 by Rona Perla RN  Outcome: Ongoing  4/15/2021 0638 by Radha Hampton RN  Outcome: Ongoing  Goal: Promote optimal lung function  4/15/2021 0821 by Rona Perla RN  Outcome: Ongoing  4/15/2021 0638 by Radha Hampton RN  Outcome: Ongoing     Problem: Breathing Pattern - Ineffective  Goal: Ability to achieve and maintain a regular respiratory rate  4/15/2021 0821 by Rona Perla RN  Outcome: Ongoing  4/15/2021 0638 by Radha Hampton RN  Outcome: Ongoing     Problem:  Body Temperature -  Risk of, Imbalanced  Goal: Ability to maintain a body temperature within defined limits  4/15/2021 0821 by Rona Perla RN  Outcome: Ongoing  4/15/2021 0638 by Radha Hampton RN  Outcome: Ongoing  Goal: Will regain or maintain usual level of consciousness  4/15/2021 0821 by Rona Perla RN  Outcome: Ongoing  4/15/2021 0638 by Radha Hampton RN  Outcome: Ongoing  Goal: Complications related to the disease process, condition or treatment will be avoided or minimized  4/15/2021 0821 by Rona Perla RN  Outcome: Ongoing  4/15/2021 0638 by Radha Hampton RN  Outcome: Ongoing     Problem: Isolation Precautions - Risk of Spread of Infection  Goal: Prevent transmission of infection  4/15/2021 0821 by Rona Perla RN  Outcome: Ongoing  4/15/2021 0638 by Radha Hampton RN  Outcome: Ongoing     Problem: Nutrition Deficits  Goal: Optimize nutritional status  4/15/2021 0821 by Rona Perla RN  Outcome: Ongoing  4/15/2021 0638 by Radha Hampton RN  Outcome: Ongoing     Problem: Risk for Fluid Volume Deficit  Goal: Maintain normal heart rhythm  4/15/2021 0821 by Rona Perla RN  Outcome: Ongoing  4/15/2021 0638 by Radha Hampton RN  Outcome:

## 2021-04-16 LAB
GLUCOSE BLD-MCNC: 177 MG/DL (ref 60–115)
GLUCOSE BLD-MCNC: 238 MG/DL (ref 60–115)
GLUCOSE BLD-MCNC: 254 MG/DL (ref 60–115)
GLUCOSE BLD-MCNC: 308 MG/DL (ref 60–115)
PERFORMED ON: ABNORMAL

## 2021-04-16 PROCEDURE — 94761 N-INVAS EAR/PLS OXIMETRY MLT: CPT

## 2021-04-16 PROCEDURE — 2500000003 HC RX 250 WO HCPCS: Performed by: INTERNAL MEDICINE

## 2021-04-16 PROCEDURE — 6360000002 HC RX W HCPCS: Performed by: INTERNAL MEDICINE

## 2021-04-16 PROCEDURE — 99232 SBSQ HOSP IP/OBS MODERATE 35: CPT | Performed by: INTERNAL MEDICINE

## 2021-04-16 PROCEDURE — 2700000000 HC OXYGEN THERAPY PER DAY

## 2021-04-16 PROCEDURE — 2580000003 HC RX 258: Performed by: INTERNAL MEDICINE

## 2021-04-16 PROCEDURE — 99233 SBSQ HOSP IP/OBS HIGH 50: CPT | Performed by: INTERNAL MEDICINE

## 2021-04-16 PROCEDURE — 6370000000 HC RX 637 (ALT 250 FOR IP): Performed by: INTERNAL MEDICINE

## 2021-04-16 PROCEDURE — 2000000000 HC ICU R&B

## 2021-04-16 RX ADMIN — PREGABALIN 75 MG: 75 CAPSULE ORAL at 08:21

## 2021-04-16 RX ADMIN — LISINOPRIL 10 MG: 10 TABLET ORAL at 21:55

## 2021-04-16 RX ADMIN — INSULIN GLARGINE 70 UNITS: 100 INJECTION, SOLUTION SUBCUTANEOUS at 22:00

## 2021-04-16 RX ADMIN — ENOXAPARIN SODIUM 80 MG: 80 INJECTION SUBCUTANEOUS at 08:20

## 2021-04-16 RX ADMIN — LISINOPRIL 10 MG: 10 TABLET ORAL at 08:21

## 2021-04-16 RX ADMIN — QUETIAPINE FUMARATE 50 MG: 50 TABLET ORAL at 21:55

## 2021-04-16 RX ADMIN — PANTOPRAZOLE SODIUM 40 MG: 40 TABLET, DELAYED RELEASE ORAL at 06:38

## 2021-04-16 RX ADMIN — GUAIFENESIN 200 MG: 100 SOLUTION ORAL at 09:57

## 2021-04-16 RX ADMIN — REMDESIVIR 100 MG: 100 INJECTION, POWDER, LYOPHILIZED, FOR SOLUTION INTRAVENOUS at 08:20

## 2021-04-16 RX ADMIN — DEXAMETHASONE SODIUM PHOSPHATE 6 MG: 4 INJECTION, SOLUTION INTRA-ARTICULAR; INTRALESIONAL; INTRAMUSCULAR; INTRAVENOUS; SOFT TISSUE at 08:21

## 2021-04-16 RX ADMIN — PREGABALIN 75 MG: 75 CAPSULE ORAL at 21:55

## 2021-04-16 RX ADMIN — GUAIFENESIN 200 MG: 100 SOLUTION ORAL at 21:55

## 2021-04-16 RX ADMIN — TRAMADOL HYDROCHLORIDE 100 MG: 50 TABLET ORAL at 17:21

## 2021-04-16 RX ADMIN — GUAIFENESIN 200 MG: 100 SOLUTION ORAL at 06:38

## 2021-04-16 RX ADMIN — TRAMADOL HYDROCHLORIDE 100 MG: 50 TABLET ORAL at 00:39

## 2021-04-16 RX ADMIN — GUAIFENESIN 200 MG: 100 SOLUTION ORAL at 01:00

## 2021-04-16 RX ADMIN — TRAMADOL HYDROCHLORIDE 100 MG: 50 TABLET ORAL at 06:37

## 2021-04-16 RX ADMIN — LEVOTHYROXINE SODIUM 150 MCG: 0.07 TABLET ORAL at 06:38

## 2021-04-16 RX ADMIN — OYSTER SHELL CALCIUM WITH VITAMIN D 1 TABLET: 500; 200 TABLET, FILM COATED ORAL at 08:21

## 2021-04-16 RX ADMIN — INSULIN LISPRO 6 UNITS: 100 INJECTION, SOLUTION INTRAVENOUS; SUBCUTANEOUS at 11:45

## 2021-04-16 RX ADMIN — ENOXAPARIN SODIUM 80 MG: 80 INJECTION SUBCUTANEOUS at 21:55

## 2021-04-16 RX ADMIN — GUAIFENESIN 200 MG: 100 SOLUTION ORAL at 15:00

## 2021-04-16 RX ADMIN — TRAMADOL HYDROCHLORIDE 100 MG: 50 TABLET ORAL at 23:06

## 2021-04-16 RX ADMIN — INSULIN LISPRO 12 UNITS: 100 INJECTION, SOLUTION INTRAVENOUS; SUBCUTANEOUS at 08:22

## 2021-04-16 RX ADMIN — QUETIAPINE FUMARATE 50 MG: 50 TABLET ORAL at 08:21

## 2021-04-16 ASSESSMENT — PAIN DESCRIPTION - LOCATION: LOCATION: BACK

## 2021-04-16 ASSESSMENT — ENCOUNTER SYMPTOMS
COUGH: 1
SHORTNESS OF BREATH: 1
GASTROINTESTINAL NEGATIVE: 1

## 2021-04-16 ASSESSMENT — PAIN SCALES - GENERAL
PAINLEVEL_OUTOF10: 6
PAINLEVEL_OUTOF10: 3
PAINLEVEL_OUTOF10: 6
PAINLEVEL_OUTOF10: 0
PAINLEVEL_OUTOF10: 5

## 2021-04-16 ASSESSMENT — PAIN DESCRIPTION - DESCRIPTORS: DESCRIPTORS: ACHING;DISCOMFORT

## 2021-04-16 ASSESSMENT — PAIN DESCRIPTION - PROGRESSION: CLINICAL_PROGRESSION: RAPIDLY WORSENING

## 2021-04-16 ASSESSMENT — PAIN DESCRIPTION - ORIENTATION: ORIENTATION: LOWER

## 2021-04-16 NOTE — PATIENT CARE CONFERENCE
Called patient  Lucio Richards and updated him on plan of care. Answered questions he had. Agrees with plan.  Electronically signed by Jolie Vega RN on 4/16/2021 at 1:52 PM

## 2021-04-16 NOTE — PROGRESS NOTES
Pulmonary & Critical Care Medicine ICU Progress Note    Subjective:     She was able to get to the bedside chair today. She states she feels much better since getting out of bed. She remains on Airvo. EXAM:  General: No acute distress, resting comfortably in the bedside chair  HEENT: Normocephalic, atraumatic, Airvo in place  Lungs : Crackles bilaterally, no wheezes, no respiratory distress  Heart: Regular rate and rhythm  ABD: Obese, positive bowel sounds, soft, nontender to palpation  Extremities : Warm, dry  Neuro: Alert and oriented x4, no focal motor or sensory deficits appreciated  Skin: No rashes     No results for input(s): PHART, ULD4ZIZ, PO2ART in the last 72 hours.       IV:   dextrose         Vitals:  BP (!) 156/77   Pulse 76   Temp 97.8 °F (36.6 °C) (Oral)   Resp 17   Ht 5' 6\" (1.676 m)   Wt (!) 329 lb 12.9 oz (149.6 kg)   SpO2 95%   BMI 53.23 kg/m²          Intake/Output Summary (Last 24 hours) at 4/16/2021 1423  Last data filed at 4/16/2021 0700  Gross per 24 hour   Intake 260 ml   Output 950 ml   Net -690 ml       Medications:  Scheduled Meds:   insulin glargine  70 Units Subcutaneous Nightly    lisinopril  10 mg Oral BID    insulin lispro  5 Units Subcutaneous TID WC    traMADol  100 mg Oral Q12H    pregabalin  75 mg Oral BID    insulin lispro  0-18 Units Subcutaneous TID WC    insulin lispro  0-9 Units Subcutaneous Nightly    remdesivir IVPB  100 mg Intravenous Q24H    enoxaparin  0.5 mg/kg Subcutaneous BID    dexamethasone  6 mg Intravenous Q24H    pantoprazole  40 mg Oral QAM AC    levothyroxine  150 mcg Oral Daily    calcium-vitamin D  1 tablet Oral Daily    QUEtiapine  50 mg Oral BID       Labs:   CBC:   Recent Labs     04/14/21  0556 04/15/21  0610   WBC 10.2 8.5   HGB 12.1 11.7*   HCT 37.5 36.5*   MCV 73.4* 73.7*    348     BMP:   Recent Labs     04/14/21  0554 04/15/21  0610    141   K 5.0* 4.5    105   CO2 23 22   BUN 29* 27*   CREATININE 1.04*

## 2021-04-16 NOTE — PROGRESS NOTES
Infectious Disease     Patient Name: Rashard Qureshi  Date: 4/16/2021  YOB: 1970  Medical Record Number: [id number]        COVID-19 pneumonia      CTshowed lateral groundglass infiltrate  Procalcitonin normal    Currently on high flow oxygen 60 L a minute satting 92%    Sitting up in chair appears more comfortable  Less short of breath    Review of Systems   Constitutional: Negative for chills, diaphoresis, fatigue and fever. Respiratory: Positive for cough and shortness of breath. Cardiovascular: Negative. Gastrointestinal: Negative. Physical Exam   Constitutional: She appears distressed. Morbidly obese   Cardiovascular: Normal heart sounds. No murmur heard. Pulmonary/Chest: Effort normal. No respiratory distress. She has no wheezes. She has no rales. She exhibits no tenderness. Abdominal: Soft. She exhibits no distension and no mass. There is no abdominal tenderness. There is no rebound and no guarding. Blood pressure (!) 174/93, pulse 93, temperature 97.8 °F (36.6 °C), temperature source Oral, resp. rate 16, height 5' 6\" (1.676 m), weight (!) 329 lb 12.9 oz (149.6 kg), SpO2 99 %, not currently breastfeeding.       .   Lab Results   Component Value Date    WBC 8.5 04/15/2021    HGB 11.7 (L) 04/15/2021    HCT 36.5 (L) 04/15/2021    MCV 73.7 (L) 04/15/2021     04/15/2021     Lab Results   Component Value Date     04/15/2021    K 4.5 04/15/2021     04/15/2021    CO2 22 04/15/2021    BUN 27 04/15/2021    CREATININE 1.00 04/15/2021    GLUCOSE 266 04/15/2021    CALCIUM 8.6 04/15/2021            PLAN:    COVID-19 pneumonia   dexamethasone remdesivir   Actemra

## 2021-04-16 NOTE — PROGRESS NOTES
0700: Report received, assumed care of patient. Dr. Capri Stinson here for rounds; see new orders. 0730: Patient call light on. Patient had small loose bowel movement. Patient given complete bath including vianney care and linens on bed changed. Patient has pure wick in, replaced. Patient is on her menses. Denies further needs at this time. 1511: Am meds, accucheck and breakfast given to patient at this time. Patient still has poor appetite. 0945: Rounds completed with team.   21 : Patient up to recliner chair with standby assist. Patient has gown on and resting at this time. Sats 95% with high flow oxygen. Call light remains in reach. 1200: Reassessed; Patient remains in chair. Hair washed and braided, gown changed. Patient eating lunch. Call light in reach. 1330:Patient remains up in chair. No distress noted. 1350: Called patient spouse; Cheryl Fresh and updated him. 1500: Patient up to [facility], voided. Dr. Haylee Pimentel in and oxygen turned down to 75% FI02.   1730: Patient refused insulin with dinner. States she is not going eat that much and doesn't want her blood sugar to go to low. Patient asked to remain in chair.

## 2021-04-16 NOTE — PROGRESS NOTES
pulmonologist.  DVT prophylaxis is addressed by intensivist.     Continue Accu-Chek with a sliding scale coverage. I increase the dose of her Lantus to achieve better control.     Elevated liver enzymes, outpatient investigation rule out nonalcoholic steatohepatitis, autoimmune, infiltrative, neoplastic liver disease. No further rise of the liver enzymes to justify discontinuation of remdesivir.

## 2021-04-17 LAB
ALBUMIN SERPL-MCNC: 2.9 G/DL (ref 3.5–4.6)
ALP BLD-CCNC: 117 U/L (ref 40–130)
ALT SERPL-CCNC: 41 U/L (ref 0–33)
ANION GAP SERPL CALCULATED.3IONS-SCNC: 11 MEQ/L (ref 9–15)
ANISOCYTOSIS: ABNORMAL
AST SERPL-CCNC: 37 U/L (ref 0–35)
ATYPICAL LYMPHOCYTE RELATIVE PERCENT: 3 %
BASOPHILS ABSOLUTE: 0 K/UL (ref 0–0.2)
BASOPHILS RELATIVE PERCENT: 0.3 %
BILIRUB SERPL-MCNC: 0.4 MG/DL (ref 0.2–0.7)
BUN BLDV-MCNC: 21 MG/DL (ref 6–20)
CALCIUM SERPL-MCNC: 8.6 MG/DL (ref 8.5–9.9)
CHLORIDE BLD-SCNC: 100 MEQ/L (ref 95–107)
CO2: 24 MEQ/L (ref 20–31)
CREAT SERPL-MCNC: 0.91 MG/DL (ref 0.5–0.9)
EOSINOPHILS ABSOLUTE: 0 K/UL (ref 0–0.7)
EOSINOPHILS RELATIVE PERCENT: 1.7 %
GFR AFRICAN AMERICAN: >60
GFR NON-AFRICAN AMERICAN: >60
GLOBULIN: 3.5 G/DL (ref 2.3–3.5)
GLUCOSE BLD-MCNC: 203 MG/DL (ref 60–115)
GLUCOSE BLD-MCNC: 235 MG/DL (ref 60–115)
GLUCOSE BLD-MCNC: 267 MG/DL (ref 70–99)
GLUCOSE BLD-MCNC: 340 MG/DL (ref 60–115)
HCT VFR BLD CALC: 36.2 % (ref 37–47)
HEMOGLOBIN: 11.6 G/DL (ref 12–16)
LYMPHOCYTES ABSOLUTE: 1.8 K/UL (ref 1–4.8)
LYMPHOCYTES RELATIVE PERCENT: 17 %
MAGNESIUM: 1.4 MG/DL (ref 1.7–2.4)
MCH RBC QN AUTO: 23.6 PG (ref 27–31.3)
MCHC RBC AUTO-ENTMCNC: 32.1 % (ref 33–37)
MCV RBC AUTO: 73.5 FL (ref 82–100)
MICROCYTES: ABNORMAL
MONOCYTES ABSOLUTE: 0.7 K/UL (ref 0.2–0.8)
MONOCYTES RELATIVE PERCENT: 7.6 %
NEUTROPHILS ABSOLUTE: 6.6 K/UL (ref 1.4–6.5)
NEUTROPHILS RELATIVE PERCENT: 73 %
PDW BLD-RTO: 17.6 % (ref 11.5–14.5)
PERFORMED ON: ABNORMAL
PLATELET # BLD: 351 K/UL (ref 130–400)
PLATELET SLIDE REVIEW: NORMAL
POTASSIUM SERPL-SCNC: 4.3 MEQ/L (ref 3.4–4.9)
RBC # BLD: 4.92 M/UL (ref 4.2–5.4)
SODIUM BLD-SCNC: 135 MEQ/L (ref 135–144)
TOTAL PROTEIN: 6.4 G/DL (ref 6.3–8)
WBC # BLD: 9.1 K/UL (ref 4.8–10.8)

## 2021-04-17 PROCEDURE — 6370000000 HC RX 637 (ALT 250 FOR IP): Performed by: INTERNAL MEDICINE

## 2021-04-17 PROCEDURE — 83735 ASSAY OF MAGNESIUM: CPT

## 2021-04-17 PROCEDURE — 94761 N-INVAS EAR/PLS OXIMETRY MLT: CPT

## 2021-04-17 PROCEDURE — 80053 COMPREHEN METABOLIC PANEL: CPT

## 2021-04-17 PROCEDURE — 99233 SBSQ HOSP IP/OBS HIGH 50: CPT | Performed by: INTERNAL MEDICINE

## 2021-04-17 PROCEDURE — 2580000003 HC RX 258: Performed by: INTERNAL MEDICINE

## 2021-04-17 PROCEDURE — 2500000003 HC RX 250 WO HCPCS: Performed by: INTERNAL MEDICINE

## 2021-04-17 PROCEDURE — 2700000000 HC OXYGEN THERAPY PER DAY

## 2021-04-17 PROCEDURE — 2060000000 HC ICU INTERMEDIATE R&B

## 2021-04-17 PROCEDURE — 6360000002 HC RX W HCPCS: Performed by: INTERNAL MEDICINE

## 2021-04-17 PROCEDURE — 36415 COLL VENOUS BLD VENIPUNCTURE: CPT

## 2021-04-17 PROCEDURE — 85025 COMPLETE CBC W/AUTO DIFF WBC: CPT

## 2021-04-17 RX ADMIN — ENOXAPARIN SODIUM 80 MG: 80 INJECTION SUBCUTANEOUS at 09:10

## 2021-04-17 RX ADMIN — PREGABALIN 75 MG: 75 CAPSULE ORAL at 21:50

## 2021-04-17 RX ADMIN — GUAIFENESIN 200 MG: 100 SOLUTION ORAL at 21:49

## 2021-04-17 RX ADMIN — LISINOPRIL 10 MG: 10 TABLET ORAL at 09:10

## 2021-04-17 RX ADMIN — ENOXAPARIN SODIUM 80 MG: 80 INJECTION SUBCUTANEOUS at 21:50

## 2021-04-17 RX ADMIN — REMDESIVIR 100 MG: 100 INJECTION, POWDER, LYOPHILIZED, FOR SOLUTION INTRAVENOUS at 12:27

## 2021-04-17 RX ADMIN — PREGABALIN 75 MG: 75 CAPSULE ORAL at 09:10

## 2021-04-17 RX ADMIN — DEXAMETHASONE SODIUM PHOSPHATE 6 MG: 4 INJECTION, SOLUTION INTRA-ARTICULAR; INTRALESIONAL; INTRAMUSCULAR; INTRAVENOUS; SOFT TISSUE at 09:10

## 2021-04-17 RX ADMIN — LEVOTHYROXINE SODIUM 150 MCG: 0.07 TABLET ORAL at 06:45

## 2021-04-17 RX ADMIN — INSULIN LISPRO 6 UNITS: 100 INJECTION, SOLUTION INTRAVENOUS; SUBCUTANEOUS at 12:27

## 2021-04-17 RX ADMIN — TRAMADOL HYDROCHLORIDE 100 MG: 50 TABLET ORAL at 18:15

## 2021-04-17 RX ADMIN — GUAIFENESIN 200 MG: 100 SOLUTION ORAL at 02:00

## 2021-04-17 RX ADMIN — INSULIN GLARGINE 70 UNITS: 100 INJECTION, SOLUTION SUBCUTANEOUS at 21:49

## 2021-04-17 RX ADMIN — INSULIN LISPRO 9 UNITS: 100 INJECTION, SOLUTION INTRAVENOUS; SUBCUTANEOUS at 09:38

## 2021-04-17 RX ADMIN — GUAIFENESIN 200 MG: 100 SOLUTION ORAL at 18:14

## 2021-04-17 RX ADMIN — QUETIAPINE FUMARATE 50 MG: 50 TABLET ORAL at 09:10

## 2021-04-17 RX ADMIN — GUAIFENESIN 200 MG: 100 SOLUTION ORAL at 06:45

## 2021-04-17 RX ADMIN — PANTOPRAZOLE SODIUM 40 MG: 40 TABLET, DELAYED RELEASE ORAL at 06:45

## 2021-04-17 RX ADMIN — OYSTER SHELL CALCIUM WITH VITAMIN D 1 TABLET: 500; 200 TABLET, FILM COATED ORAL at 09:10

## 2021-04-17 RX ADMIN — LISINOPRIL 15 MG: 10 TABLET ORAL at 21:50

## 2021-04-17 RX ADMIN — QUETIAPINE FUMARATE 50 MG: 50 TABLET ORAL at 21:50

## 2021-04-17 RX ADMIN — TRAMADOL HYDROCHLORIDE 100 MG: 50 TABLET ORAL at 06:45

## 2021-04-17 RX ADMIN — TRAMADOL HYDROCHLORIDE 100 MG: 50 TABLET ORAL at 22:53

## 2021-04-17 RX ADMIN — GUAIFENESIN 200 MG: 100 SOLUTION ORAL at 12:35

## 2021-04-17 RX ADMIN — INSULIN LISPRO 6 UNITS: 100 INJECTION, SOLUTION INTRAVENOUS; SUBCUTANEOUS at 18:22

## 2021-04-17 ASSESSMENT — PAIN SCALES - GENERAL
PAINLEVEL_OUTOF10: 5
PAINLEVEL_OUTOF10: 0
PAINLEVEL_OUTOF10: 3
PAINLEVEL_OUTOF10: 2
PAINLEVEL_OUTOF10: 0
PAINLEVEL_OUTOF10: 4

## 2021-04-17 ASSESSMENT — PAIN DESCRIPTION - ONSET: ONSET: ON-GOING

## 2021-04-17 ASSESSMENT — PAIN DESCRIPTION - PROGRESSION
CLINICAL_PROGRESSION: GRADUALLY IMPROVING

## 2021-04-17 ASSESSMENT — PAIN DESCRIPTION - LOCATION: LOCATION: BACK

## 2021-04-17 ASSESSMENT — PAIN DESCRIPTION - ORIENTATION: ORIENTATION: LOWER

## 2021-04-17 ASSESSMENT — PAIN DESCRIPTION - DESCRIPTORS: DESCRIPTORS: ACHING

## 2021-04-17 ASSESSMENT — PAIN DESCRIPTION - FREQUENCY: FREQUENCY: CONTINUOUS

## 2021-04-17 ASSESSMENT — PAIN - FUNCTIONAL ASSESSMENT: PAIN_FUNCTIONAL_ASSESSMENT: ACTIVITIES ARE NOT PREVENTED

## 2021-04-17 NOTE — PROGRESS NOTES
Patient is feeling better. FiO2 is down to 75. No chest pain or palpitation. Patient reported that her shortness of breath is much better than the previously reported the. Patient is having bowel movement. No fever or chills. No headaches, loss of consciousness or seizure.       Exam: Awake, alert oriented, in mild respiratory distress on high flow oxygen.  The patient is morbidly obese. HEENT: Pale conjunctiva and buccal mucosa.  Normal and throat and nose  Neck: Supple, no nuchal rigidity. Endo: No thyromegaly. Vascular: No JVD or carotid bruit. Chest: Diminished breath sounds and soft crackles. Heart: Regular rate and rhythm, no extra sounds.  No murmur, no rub. Abdomen: Soft, no tenderness, no rebound, no rigidity.  Increased abdominal girth therefore clinically I could not exclude the possibility of intra-abdominal mass or organomegaly. LE: No cyanosis or clubbing, no varices or edema. Neuro: Awake, alert, oriented, normal speech, normal comprehension, normal extension, normal cranial nerves, normal and symmetrical motor and tone examination throughout. MS: No joint effusion or tenderness. Skin: No skin rash, itching, bruising or significant findings.     Assessment and plan:      *Hypoxic respiratory failure. Improving     *Bilateral infiltrates highly suggestive of a COVID-19 pneumonia.     *Diabetes with hyperglycemia.  Fair control at this time     *Morbid obesity.     *LEI, resolved.     *Anemia, no evidence of acute blood loss.   Stable.     *Elevated liver enzymes.  Patient probably has fatty liver disease.  I cannot exclude other possible etiologies such as viral, autoimmune, infiltrative and/or neoplastic liver disease.  Hepatitis panel came back negative.     *Hypertension.  Started the patient on amlodipine. Better control at this time.     Plan:  Continue current medical care. Patient has been stable over the last 48 hours.   Transfer patient to the medical unit with telemetry. Patient continues to be on high flow oxygen but FiO2 support is diminishing.

## 2021-04-17 NOTE — PROGRESS NOTES
or Chronic w/ Exacerbation  []     Surgical Status No [x]   Surgeries     General []   Surgery Lower []   Abdominal Thoracic or []   Upper Abdominal Thoracic with  PulmonaryDisorder  []     Chest X-ray Clear/Not  Ordered     [x]  Chronic Changes  Results Pending  []  Infiltrates, atelectasis, pleural effusion, or edema  []  Infiltrates in more than one lobe []  Infiltrate + Atelectasis, &/or pleural effusion  []    Respiratory Pattern Regular,  RR = 12-20 [x]  Increased,  RR = 21-25 []  MOHAN, irregular,  or RR = 26-30 []  Decreased FEV1  or RR = 31-35 []  Severe SOB, use  of accessory muscles, or RR ? 35  []    Mental Status Alert, oriented,  Cooperative [x]  Confused but Follows commands []  Lethargic or unable to follow commands []  Obtunded  []  Comatose  []    Breath Sounds Clear to  auscultation  []  Decreased unilaterally or  in bases only [x]  Decreased  bilaterally  []  Crackles or intermittent wheezes []  Wheezes []    Cough Strong, Spontan., & nonproductive [x]  Strong,  spontaneous, &  productive []  Weak,  Nonproductive []  Weak, productive or  with wheezes []  No spontaneous  cough or may require suctioning []    Level of Activity Ambulatory [x]  Ambulatory w/ Assist  []  Non-ambulatory []  Paraplegic []  Quadriplegic []    Total    Score:___4____     Triage Score:_____5___      Tri       Triage:     1. (>20) Freq: Q3    2. (16-20) Freq: Q4   3. (11-15) Freq: QID & Albuterol Q2 PRN    4. (6-10) Freq: TID & Albuterol Q2 PRN    5. (0-5) Freq Q4prn

## 2021-04-17 NOTE — PLAN OF CARE

## 2021-04-17 NOTE — PROGRESS NOTES
0800: pt up on chair watching tv, resting comfortably, denies CP, denies SOB. Vitals stable, call light within reach. Dr. Jose Enrique Edmond ok with transfer to floor, will ask dr. Hermann Grimm prior to moving.     [phone]: called report to [address]   Placed pt for transport

## 2021-04-17 NOTE — PROGRESS NOTES
Pulmonary & Critical Care Medicine ICU Progress Note    Subjective:     No overnight events reported. She is currently resting comfortably in the bedside chair. She states she did not sleep well overnight, but this was because she was watching a movie. She denies any complaints at this time. EXAM:  General: No acute distress, resting comfortably in the bedside chair  HEENT: Normocephalic, atraumatic, Airvo in place  Lungs : Crackles bilaterally, no wheezes, no respiratory distress  Heart: Regular rate and rhythm  ABD: Obese, positive bowel sounds, soft, nontender to palpation  Extremities : Warm, dry  Neuro: Alert and oriented x4, no focal motor or sensory deficits appreciated  Skin: No rashes     No results for input(s): PHART, PUO1ACT, PO2ART in the last 72 hours.       IV:   dextrose         Vitals:  BP (!) 164/71   Pulse 79   Temp 98.6 °F (37 °C) (Oral)   Resp 19   Ht 5' 6\" (1.676 m)   Wt (!) 329 lb 12.9 oz (149.6 kg)   SpO2 97%   BMI 53.23 kg/m²          Intake/Output Summary (Last 24 hours) at 4/17/2021 1119  Last data filed at 4/17/2021 0600  Gross per 24 hour   Intake 620 ml   Output 1800 ml   Net -1180 ml       Medications:  Scheduled Meds:   lisinopril  15 mg Oral BID    insulin glargine  70 Units Subcutaneous Nightly    insulin lispro  5 Units Subcutaneous TID WC    traMADol  100 mg Oral Q12H    pregabalin  75 mg Oral BID    insulin lispro  0-18 Units Subcutaneous TID WC    insulin lispro  0-9 Units Subcutaneous Nightly    remdesivir IVPB  100 mg Intravenous Q24H    enoxaparin  0.5 mg/kg Subcutaneous BID    dexamethasone  6 mg Intravenous Q24H    pantoprazole  40 mg Oral QAM AC    levothyroxine  150 mcg Oral Daily    calcium-vitamin D  1 tablet Oral Daily    QUEtiapine  50 mg Oral BID       Labs:   CBC:   Recent Labs     04/15/21  0610 04/17/21  0616   WBC 8.5 9.1   HGB 11.7* 11.6*   HCT 36.5* 36.2*   MCV 73.7* 73.5*    351     BMP:   Recent Labs     04/15/21  0610 04/17/21  0616    135   K 4.5 4.3    100   CO2 22 24   BUN 27* 21*   CREATININE 1.00* 0.91*     LIVER PROFILE:   Recent Labs     04/15/21  0610 04/17/21  0616   AST 52* 37*   ALT 41* 41*   BILITOT 0.4 0.4   ALKPHOS 119 117     PT/INR:   No results for input(s): PROTIME, INR in the last 72 hours. APTT:   No results for input(s): APTT in the last 72 hours. UA:  Recent Labs     04/15/21  0645   COLORU RED*   PHUR 6.5   WBCUA 0-2   RBCUA >100*   CLARITYU TURBID*   SPECGRAV 1.026   LEUKOCYTESUR MODERATE*   UROBILINOGEN 0.2   BILIRUBINUR SMALL*   BLOODU MODERATE*   GLUCOSEU Negative         Assessment/Plan:    1. Acute hypoxic respiratory failure-this secondary to COVID-19 pneumonitis. She is currently on remdesivir and Decadron. Infectious disease does continue to follow. She does remain on Airvo for her oxygenation. She is currently at 60 L and 65% FiO2. I discussed with respiratory therapy decreasing her flow as well as her FiO2 further. Titrate FiO2 and flow to keep sats 88% or greater. 2. COVID-19 pneumonitis-she is currently receiving treatment with remdesivir and Decadron. She did also receive Actemra. ID does continue to follow as well. 3. DM - Lantus and SSI    4. Elevated d-dimer - this is secondary to COVID. US of UE and LE all were negative. She remains on Lovenox 0.5 mg/kg twice daily.      Nutrition: Oral diet    Prophylaxis: Lovenox and Protonix    Code Status: Full      Electronically signed by Juan Loya DO, on 4/17/2021 at 11:19 AM

## 2021-04-18 LAB
GLUCOSE BLD-MCNC: 167 MG/DL (ref 60–115)
GLUCOSE BLD-MCNC: 183 MG/DL (ref 60–115)
GLUCOSE BLD-MCNC: 295 MG/DL (ref 60–115)
GLUCOSE BLD-MCNC: 364 MG/DL (ref 60–115)
PERFORMED ON: ABNORMAL

## 2021-04-18 PROCEDURE — 94761 N-INVAS EAR/PLS OXIMETRY MLT: CPT

## 2021-04-18 PROCEDURE — 99232 SBSQ HOSP IP/OBS MODERATE 35: CPT | Performed by: INTERNAL MEDICINE

## 2021-04-18 PROCEDURE — 6360000002 HC RX W HCPCS: Performed by: INTERNAL MEDICINE

## 2021-04-18 PROCEDURE — 2700000000 HC OXYGEN THERAPY PER DAY

## 2021-04-18 PROCEDURE — 2500000003 HC RX 250 WO HCPCS: Performed by: INTERNAL MEDICINE

## 2021-04-18 PROCEDURE — 6370000000 HC RX 637 (ALT 250 FOR IP): Performed by: INTERNAL MEDICINE

## 2021-04-18 PROCEDURE — 2060000000 HC ICU INTERMEDIATE R&B

## 2021-04-18 RX ORDER — MAGNESIUM SULFATE IN WATER 40 MG/ML
2000 INJECTION, SOLUTION INTRAVENOUS ONCE
Status: COMPLETED | OUTPATIENT
Start: 2021-04-18 | End: 2021-04-18

## 2021-04-18 RX ADMIN — INSULIN LISPRO 3 UNITS: 100 INJECTION, SOLUTION INTRAVENOUS; SUBCUTANEOUS at 08:27

## 2021-04-18 RX ADMIN — ENOXAPARIN SODIUM 80 MG: 80 INJECTION SUBCUTANEOUS at 21:09

## 2021-04-18 RX ADMIN — MAGNESIUM SULFATE HEPTAHYDRATE 2000 MG: 40 INJECTION, SOLUTION INTRAVENOUS at 12:28

## 2021-04-18 RX ADMIN — OYSTER SHELL CALCIUM WITH VITAMIN D 1 TABLET: 500; 200 TABLET, FILM COATED ORAL at 08:00

## 2021-04-18 RX ADMIN — LEVOTHYROXINE SODIUM 150 MCG: 0.07 TABLET ORAL at 05:55

## 2021-04-18 RX ADMIN — TRAMADOL HYDROCHLORIDE 100 MG: 50 TABLET ORAL at 17:46

## 2021-04-18 RX ADMIN — INSULIN LISPRO 15 UNITS: 100 INJECTION, SOLUTION INTRAVENOUS; SUBCUTANEOUS at 17:58

## 2021-04-18 RX ADMIN — DEXAMETHASONE SODIUM PHOSPHATE 6 MG: 4 INJECTION, SOLUTION INTRA-ARTICULAR; INTRALESIONAL; INTRAMUSCULAR; INTRAVENOUS; SOFT TISSUE at 08:00

## 2021-04-18 RX ADMIN — INSULIN LISPRO 3 UNITS: 100 INJECTION, SOLUTION INTRAVENOUS; SUBCUTANEOUS at 12:26

## 2021-04-18 RX ADMIN — ENOXAPARIN SODIUM 80 MG: 80 INJECTION SUBCUTANEOUS at 08:00

## 2021-04-18 RX ADMIN — PREGABALIN 75 MG: 75 CAPSULE ORAL at 07:59

## 2021-04-18 RX ADMIN — GUAIFENESIN 200 MG: 100 SOLUTION ORAL at 05:54

## 2021-04-18 RX ADMIN — TRAMADOL HYDROCHLORIDE 100 MG: 50 TABLET ORAL at 05:55

## 2021-04-18 RX ADMIN — GUAIFENESIN 200 MG: 100 SOLUTION ORAL at 17:46

## 2021-04-18 RX ADMIN — QUETIAPINE FUMARATE 50 MG: 50 TABLET ORAL at 21:09

## 2021-04-18 RX ADMIN — PANTOPRAZOLE SODIUM 40 MG: 40 TABLET, DELAYED RELEASE ORAL at 05:59

## 2021-04-18 RX ADMIN — PREGABALIN 75 MG: 75 CAPSULE ORAL at 21:09

## 2021-04-18 RX ADMIN — QUETIAPINE FUMARATE 50 MG: 50 TABLET ORAL at 07:59

## 2021-04-18 RX ADMIN — LISINOPRIL 15 MG: 10 TABLET ORAL at 21:09

## 2021-04-18 RX ADMIN — GUAIFENESIN 200 MG: 100 SOLUTION ORAL at 22:56

## 2021-04-18 RX ADMIN — INSULIN GLARGINE 70 UNITS: 100 INJECTION, SOLUTION SUBCUTANEOUS at 23:33

## 2021-04-18 RX ADMIN — TRAMADOL HYDROCHLORIDE 100 MG: 50 TABLET ORAL at 22:56

## 2021-04-18 RX ADMIN — LISINOPRIL 15 MG: 10 TABLET ORAL at 07:59

## 2021-04-18 RX ADMIN — GUAIFENESIN 200 MG: 100 SOLUTION ORAL at 12:16

## 2021-04-18 ASSESSMENT — ENCOUNTER SYMPTOMS
COUGH: 1
GASTROINTESTINAL NEGATIVE: 1
SHORTNESS OF BREATH: 1

## 2021-04-18 ASSESSMENT — PAIN DESCRIPTION - PROGRESSION
CLINICAL_PROGRESSION: GRADUALLY IMPROVING

## 2021-04-18 ASSESSMENT — PAIN SCALES - GENERAL
PAINLEVEL_OUTOF10: 0
PAINLEVEL_OUTOF10: 2

## 2021-04-18 NOTE — PROGRESS NOTES
Patient continues to feel better. No new symptoms. She is still on high flow oxygen but reducing FiO2. Exam: Awake, alert oriented, in mild respiratory distress on high flow oxygen.  The patient is morbidly obese. HEENT: Pale conjunctiva and buccal mucosa.  Normal and throat and nose  Neck: Supple, no nuchal rigidity. Endo: No thyromegaly. Vascular: No JVD or carotid bruit. Chest: Diminished breath sounds and soft crackles. Heart: Regular rate and rhythm, no extra sounds.  No murmur, no rub. Abdomen: Soft, no tenderness, no rebound, no rigidity.  Increased abdominal girth therefore clinically I could not exclude the possibility of intra-abdominal mass or organomegaly. LE: No cyanosis or clubbing, no varices or edema. Neuro: Awake, alert, oriented, normal speech, normal comprehension, normal extension, normal cranial nerves, normal and symmetrical motor and tone examination throughout. MS: No joint effusion or tenderness. Skin: No skin rash, itching, bruising or significant findings.     Assessment and plan:      *Hypoxic respiratory failure. Improving     *Bilateral infiltrates highly suggestive of a COVID-19 pneumonia.     *Diabetes with hyperglycemia.  Fair control at this time     *Morbid obesity.     *LEI, resolved.     *Anemia, no evidence of acute blood loss.   Stable.     *Elevated liver enzymes.  Patient probably has fatty liver disease.  I cannot exclude other possible etiologies such as viral, autoimmune, infiltrative and/or neoplastic liver disease.  Hepatitis panel came back negative.     *Hypertension.  Started the patient on amlodipine. Better control at this time. *Positive D-dimer. DVT prophylaxis is addressed by pulmonologist.     Plan:  Continue current medical care. Patient has been stable over the last 48 hours. Transfer patient to the medical unit with telemetry. Patient continues to be on high flow oxygen but FiO2 support is diminishing.

## 2021-04-18 NOTE — PROGRESS NOTES
Pulmonary & Critical Care Medicine ICU Progress Note    Subjective:     No overnight events reported. He was able to be transferred out of the ICU yesterday. Her O2 has been weaned down significantly since my last exam.  She is currently on high flow nasal cannula bubbler. She is currently eating dinner. She is without any complaints. EXAM:  General: No acute distress, resting comfortably in bed  HEENT: Normocephalic, atraumatic  Lungs : Crackles bilaterally but improving, no wheezes, no respiratory distress  Heart: Regular rate and rhythm  ABD: Obese, positive bowel sounds, soft, nontender to palpation  Extremities : Warm, dry  Neuro: Alert and oriented x4, no focal motor or sensory deficits appreciated  Skin: No rashes     No results for input(s): PHART, KIW7YID, PO2ART in the last 72 hours.       IV:   dextrose         Vitals:  BP (!) 112/53   Pulse 90   Temp 97.6 °F (36.4 °C) (Oral)   Resp 20   Ht 5' 6\" (1.676 m)   Wt (!) 329 lb 12.9 oz (149.6 kg)   SpO2 95%   BMI 53.23 kg/m²          Intake/Output Summary (Last 24 hours) at 4/18/2021 1651  Last data filed at 4/18/2021 0832  Gross per 24 hour   Intake 460 ml   Output --   Net 460 ml       Medications:  Scheduled Meds:   lisinopril  15 mg Oral BID    insulin glargine  70 Units Subcutaneous Nightly    insulin lispro  5 Units Subcutaneous TID WC    traMADol  100 mg Oral Q12H    pregabalin  75 mg Oral BID    insulin lispro  0-18 Units Subcutaneous TID WC    insulin lispro  0-9 Units Subcutaneous Nightly    enoxaparin  0.5 mg/kg Subcutaneous BID    dexamethasone  6 mg Intravenous Q24H    pantoprazole  40 mg Oral QAM AC    levothyroxine  150 mcg Oral Daily    calcium-vitamin D  1 tablet Oral Daily    QUEtiapine  50 mg Oral BID       Labs:   CBC:   Recent Labs     04/17/21  0616   WBC 9.1   HGB 11.6*   HCT 36.2*   MCV 73.5*        BMP:   Recent Labs     04/17/21  0616      K 4.3      CO2 24   BUN 21*   CREATININE 0.91*

## 2021-04-18 NOTE — PROGRESS NOTES
Infectious Disease     Patient Name: Richard Labor  Date: 4/18/2021  YOB: 1970  Medical Record Number: [id number]    History of present illness: Follow-up COVID-19 pneumonia with acute respiratory failure and hypoxia with progressive clinical improvement with decreased shortness of breath and cough, improved appetite, decrease generalized weakness and fatigue. Decreased hypoxia, currently on 8 L nasal cannula. Status post remdesivir and Actemra  No fevers    Review of Systems   Constitutional: Negative for chills, diaphoresis, fatigue and fever. Respiratory: Positive for cough and shortness of breath. Cardiovascular: Negative. Gastrointestinal: Negative. All other systems reviewed and are negative. Vitals:    04/18/21 0429 04/18/21 0601 04/18/21 0754 04/18/21 1215   BP:  100/86 132/79 (!) 112/53   Pulse:  80 88 90   Resp:  16 20 20   Temp:  98.2 °F (36.8 °C) 98.1 °F (36.7 °C) 97.6 °F (36.4 °C)   TempSrc:  Axillary Axillary Oral   SpO2: 97% 97% 96% 95%   Weight:       Height:         General Appearance: alert and oriented to person, place and time, well-developed and well-nourished, in no acute distress  Skin: warm and dry, no rash. Head: normocephalic and atraumatic  Eyes: anicteric sclerae  ENT: oropharynx clear and moist with normal mucous membranes. No oral thrush  Lungs: normal respiratory effort, diminished breath sounds bilateral lung fields  Heart normal S1-S2 no murmur  Abdomen: soft, no tenderness, obese  No leg edema  No erythema, no tenderness    .    Lab Results   Component Value Date    WBC 9.1 04/17/2021    HGB 11.6 (L) 04/17/2021    HCT 36.2 (L) 04/17/2021    MCV 73.5 (L) 04/17/2021     04/17/2021     Lab Results   Component Value Date     04/17/2021    K 4.3 04/17/2021    K 4.5 04/15/2021     04/17/2021    CO2 24 04/17/2021    BUN 21 04/17/2021    CREATININE 0.91 04/17/2021    GLUCOSE 267 04/17/2021    CALCIUM 8.6 04/17/2021          Assessment: Severe COVID-19 pneumonia, improving  Acute respiratory failure with hypoxia  Hypercoagulable state  Morbid obesity    PLAN:  Continue oxygen support and weaning as tolerated  dexamethasone and anticoagulation as ordered    Jesús Buchanan MD

## 2021-04-18 NOTE — FLOWSHEET NOTE
0800- Assessment complete. Patient alert and oriented x 4. Vital signs stable. Oxygen saturation 96% on 8 L bubbler. Medications given as ordered. 1216- Robitussin given per request. Oxygen saturation 95% on 8L bubbler. Patient set up with soapy wash cloths to wash up and new gown. Patient states she can do it independently. 1440- Oxygen saturation 97% on 8 L bubbler. 1750- Robitussin given per request for cough. No respiratory distress noted, oxygen saturation 97%. Electronically signed by Garry Farrell on 4/18/2021 at 7:51 PM

## 2021-04-19 LAB
ANION GAP SERPL CALCULATED.3IONS-SCNC: 12 MEQ/L (ref 9–15)
BASOPHILS ABSOLUTE: 0 K/UL (ref 0–0.2)
BASOPHILS RELATIVE PERCENT: 0.4 %
BUN BLDV-MCNC: 21 MG/DL (ref 6–20)
CALCIUM SERPL-MCNC: 9.1 MG/DL (ref 8.5–9.9)
CHLORIDE BLD-SCNC: 99 MEQ/L (ref 95–107)
CO2: 25 MEQ/L (ref 20–31)
CREAT SERPL-MCNC: 0.97 MG/DL (ref 0.5–0.9)
EOSINOPHILS ABSOLUTE: 0.3 K/UL (ref 0–0.7)
EOSINOPHILS RELATIVE PERCENT: 3.7 %
GFR AFRICAN AMERICAN: >60
GFR NON-AFRICAN AMERICAN: >60
GLUCOSE BLD-MCNC: 241 MG/DL (ref 60–115)
GLUCOSE BLD-MCNC: 252 MG/DL (ref 70–99)
GLUCOSE BLD-MCNC: 268 MG/DL (ref 60–115)
GLUCOSE BLD-MCNC: 322 MG/DL (ref 60–115)
GLUCOSE BLD-MCNC: 408 MG/DL (ref 60–115)
HCT VFR BLD CALC: 38 % (ref 37–47)
HEMOGLOBIN: 12 G/DL (ref 12–16)
LYMPHOCYTES ABSOLUTE: 1.7 K/UL (ref 1–4.8)
LYMPHOCYTES RELATIVE PERCENT: 21.5 %
MAGNESIUM: 1.6 MG/DL (ref 1.7–2.4)
MCH RBC QN AUTO: 23.5 PG (ref 27–31.3)
MCHC RBC AUTO-ENTMCNC: 31.7 % (ref 33–37)
MCV RBC AUTO: 74.2 FL (ref 82–100)
MONOCYTES ABSOLUTE: 0.9 K/UL (ref 0.2–0.8)
MONOCYTES RELATIVE PERCENT: 11.7 %
NEUTROPHILS ABSOLUTE: 4.9 K/UL (ref 1.4–6.5)
NEUTROPHILS RELATIVE PERCENT: 62.7 %
PDW BLD-RTO: 17.9 % (ref 11.5–14.5)
PERFORMED ON: ABNORMAL
PLATELET # BLD: 373 K/UL (ref 130–400)
POTASSIUM SERPL-SCNC: 3.9 MEQ/L (ref 3.4–4.9)
RBC # BLD: 5.12 M/UL (ref 4.2–5.4)
SODIUM BLD-SCNC: 136 MEQ/L (ref 135–144)
WBC # BLD: 7.9 K/UL (ref 4.8–10.8)

## 2021-04-19 PROCEDURE — 94760 N-INVAS EAR/PLS OXIMETRY 1: CPT

## 2021-04-19 PROCEDURE — 6370000000 HC RX 637 (ALT 250 FOR IP): Performed by: INTERNAL MEDICINE

## 2021-04-19 PROCEDURE — 2500000003 HC RX 250 WO HCPCS: Performed by: INTERNAL MEDICINE

## 2021-04-19 PROCEDURE — 99232 SBSQ HOSP IP/OBS MODERATE 35: CPT | Performed by: INTERNAL MEDICINE

## 2021-04-19 PROCEDURE — 2700000000 HC OXYGEN THERAPY PER DAY

## 2021-04-19 PROCEDURE — 97161 PT EVAL LOW COMPLEX 20 MIN: CPT

## 2021-04-19 PROCEDURE — 97165 OT EVAL LOW COMPLEX 30 MIN: CPT

## 2021-04-19 PROCEDURE — 6360000002 HC RX W HCPCS: Performed by: INTERNAL MEDICINE

## 2021-04-19 PROCEDURE — 36415 COLL VENOUS BLD VENIPUNCTURE: CPT

## 2021-04-19 PROCEDURE — 83735 ASSAY OF MAGNESIUM: CPT

## 2021-04-19 PROCEDURE — 2060000000 HC ICU INTERMEDIATE R&B

## 2021-04-19 PROCEDURE — 85025 COMPLETE CBC W/AUTO DIFF WBC: CPT

## 2021-04-19 PROCEDURE — 99233 SBSQ HOSP IP/OBS HIGH 50: CPT | Performed by: INTERNAL MEDICINE

## 2021-04-19 PROCEDURE — 80048 BASIC METABOLIC PNL TOTAL CA: CPT

## 2021-04-19 RX ADMIN — ENOXAPARIN SODIUM 80 MG: 80 INJECTION SUBCUTANEOUS at 21:40

## 2021-04-19 RX ADMIN — TRAMADOL HYDROCHLORIDE 100 MG: 50 TABLET ORAL at 05:34

## 2021-04-19 RX ADMIN — GUAIFENESIN 200 MG: 100 SOLUTION ORAL at 22:56

## 2021-04-19 RX ADMIN — PREGABALIN 75 MG: 75 CAPSULE ORAL at 21:39

## 2021-04-19 RX ADMIN — PREGABALIN 75 MG: 75 CAPSULE ORAL at 11:41

## 2021-04-19 RX ADMIN — QUETIAPINE FUMARATE 50 MG: 50 TABLET ORAL at 21:39

## 2021-04-19 RX ADMIN — DEXAMETHASONE SODIUM PHOSPHATE 6 MG: 4 INJECTION, SOLUTION INTRA-ARTICULAR; INTRALESIONAL; INTRAMUSCULAR; INTRAVENOUS; SOFT TISSUE at 11:50

## 2021-04-19 RX ADMIN — INSULIN LISPRO 12 UNITS: 100 INJECTION, SOLUTION INTRAVENOUS; SUBCUTANEOUS at 18:28

## 2021-04-19 RX ADMIN — QUETIAPINE FUMARATE 50 MG: 50 TABLET ORAL at 11:54

## 2021-04-19 RX ADMIN — GUAIFENESIN 200 MG: 100 SOLUTION ORAL at 05:35

## 2021-04-19 RX ADMIN — INSULIN LISPRO 9 UNITS: 100 INJECTION, SOLUTION INTRAVENOUS; SUBCUTANEOUS at 12:00

## 2021-04-19 RX ADMIN — LISINOPRIL 15 MG: 10 TABLET ORAL at 11:42

## 2021-04-19 RX ADMIN — ENOXAPARIN SODIUM 80 MG: 80 INJECTION SUBCUTANEOUS at 11:41

## 2021-04-19 RX ADMIN — PANTOPRAZOLE SODIUM 40 MG: 40 TABLET, DELAYED RELEASE ORAL at 05:34

## 2021-04-19 RX ADMIN — GUAIFENESIN 200 MG: 100 SOLUTION ORAL at 18:36

## 2021-04-19 RX ADMIN — LISINOPRIL 15 MG: 10 TABLET ORAL at 21:39

## 2021-04-19 RX ADMIN — TRAMADOL HYDROCHLORIDE 100 MG: 50 TABLET ORAL at 22:57

## 2021-04-19 RX ADMIN — LEVOTHYROXINE SODIUM 150 MCG: 0.07 TABLET ORAL at 05:33

## 2021-04-19 RX ADMIN — GUAIFENESIN 200 MG: 100 SOLUTION ORAL at 11:41

## 2021-04-19 RX ADMIN — TRAMADOL HYDROCHLORIDE 100 MG: 50 TABLET ORAL at 18:36

## 2021-04-19 RX ADMIN — INSULIN GLARGINE 70 UNITS: 100 INJECTION, SOLUTION SUBCUTANEOUS at 22:00

## 2021-04-19 RX ADMIN — OYSTER SHELL CALCIUM WITH VITAMIN D 1 TABLET: 500; 200 TABLET, FILM COATED ORAL at 11:41

## 2021-04-19 ASSESSMENT — PAIN SCALES - GENERAL
PAINLEVEL_OUTOF10: 0
PAINLEVEL_OUTOF10: 3

## 2021-04-19 ASSESSMENT — ENCOUNTER SYMPTOMS
VOMITING: 0
COUGH: 1
SHORTNESS OF BREATH: 0
DIARRHEA: 0
SHORTNESS OF BREATH: 1
NAUSEA: 0
GASTROINTESTINAL NEGATIVE: 1

## 2021-04-19 ASSESSMENT — PAIN DESCRIPTION - LOCATION: LOCATION: BACK

## 2021-04-19 ASSESSMENT — PAIN DESCRIPTION - FREQUENCY: FREQUENCY: CONTINUOUS

## 2021-04-19 ASSESSMENT — PAIN DESCRIPTION - DESCRIPTORS: DESCRIPTORS: ACHING

## 2021-04-19 ASSESSMENT — PAIN DESCRIPTION - ORIENTATION: ORIENTATION: LOWER

## 2021-04-19 NOTE — PROGRESS NOTES
Occupational Therapy  [facility] OCCUPATIONAL THERAPY EVALUATION - ACUTE     NAME: Allie Ann  : 1970 (48 y.o.)  MRN: [id number]  CODE STATUS: Full Code  Room: B203/Y297-41    Date of Service: 2021    Patient Diagnosis(es): Respiratory failure [facility]) [J96.90]   Chief Complaint   Patient presents with    Shortness of Breath     since Friday    Fever     Patient Active Problem List    Diagnosis Date Noted    Pneumonia due to COVID-19 virus     Respiratory failure ([address] 2021    Brow ptosis 2016    Long-term insulin use ([address] 2016    Nuclear sclerotic cataract of both eyes 2016    Background retinopathy due to secondary diabetes ([address] 10/16/2014    Age related cataract 10/16/2014    Type 1 diabetes mellitus ([address] 2014    Obesity 2013    Panic attacks 2012    Pilonidal cyst     Vitamin D deficiency 2011    Chronic lymphocytic thyroiditis 2002    Acquired hypothyroidism 2002        Past Medical History:   Diagnosis Date    Arthritis     right knee and ankle    Pilonidal cyst     Thyroid disease     hypothyroid    Type 1 diabetes ([address]      Past Surgical History:   Procedure Laterality Date    APPENDECTOMY      ARTHROSCOPY / ARTHROTOMY KNEE Right 2016    RIGHT KNEE ARTHROSCOPY / MEDIAL MENISCUS TEAR / SUPINE  performed by Sandra Morris MD at [address] [place] [address]        Restrictions  Restrictions/Precautions: Fall Risk     Safety Devices: Safety Devices  Safety Devices in place: Yes  Type of devices: Call light within reach   Initially in place: No    Subjective  Pre Treatment Pain Screening  Pain at present: 3  Scale Used: Numeric Score  Intervention List: Patient able to continue with treatment, Patient declined any intervention    Pain Reassessment:   Pain Assessment  Patient Currently in Pain: Yes  Pain Assessment: 0-10  Pain Level: 3  Pain Type: Chronic pain  Pain Location: Back  Pain Orientation: Lower  Pain Descriptors: Aching  Pain Frequency: Continuous       Prior Level of Function:  Social/Functional History  Lives With: Spouse, Family(2 daughters)  Type of Home: House  Home Layout: Two level, Bed/Bath upstairs(8 steps)  Home Access: Stairs to enter with rails  Entrance Stairs - Number of Steps: 1  Bathroom Shower/Tub: Tub/Shower unit  Home Equipment: Smart Destinations  ADL Assistance: Independent  Homemaking Assistance: Independent  Homemaking Responsibilities: Yes  Ambulation Assistance: Independent(cane)  Transfer Assistance: Independent  Active : No  Patient's  Info: spouse or daughters  Occupation: On disability  Type of occupation:   Leisure & Hobbies: quilting, reading, puzzles    OBJECTIVE:     Orientation Status:  Orientation  Overall Orientation Status: Within Functional Limits    Observation:  Observation/Palpation  Observation: 7L O2, no acute distress, agreeable to OT evaluation    Cognition Status:  Cognition  Overall Cognitive Status: WFL    Perception Status:  Perception  Overall Perceptual Status: WFL    Sensation Status:  Sensation  Overall Sensation Status: WFL    Vision and Hearing Status:  Vision  Vision: Impaired  Vision Exceptions: Wears glasses at all times  Hearing  Hearing: Within functional limits     ROM:   LUE AROM (degrees)  LUE AROM : WFL  Left Hand AROM (degrees)  Left Hand AROM: WFL  RUE AROM (degrees)  RUE AROM : WFL  Right Hand AROM (degrees)  Right Hand AROM: WFL    Strength:  LUE Strength  Gross LUE Strength: Exceptions to [facility]  L Hand General: 3+/5  LUE Strength Comment: 3+/5 all planes  RUE Strength  Gross RUE Strength: Exceptions to [facility]  R Hand General: 3+/5  RUE Strength Comment: 3+/5 all planes    Coordination, Tone, Quality of Movement:    Tone RUE  RUE Tone: Normotonic  Tone LUE  LUE Tone: Normotonic  Coordination  Movements Are Fluid And Coordinated: Yes    Hand Dominance:  Hand Dominance  Hand Dominance: Right    ADL Status:  ADL  Feeding: Independent  Grooming: Independent  UE Bathing: Independent  LE Bathing: Independent  UE Dressing: Independent  LE Dressing: Independent  Toileting: Independent  Additional Comments: Simulated all ADLs as above  Toilet Transfers  Toilet Transfer: Unable to assess  Toilet Transfers Comments: Anticipate Mod I       Therapy key for assistance levels -   Independent = Pt. is able to perform task with no assistance but may require a device   Stand by assistance = Pt. does not perform task at an independent level but does not need physical assistance, requires verbal cues  Minimal, Moderate, Maximal Assistance = Pt. requires physical assistance (25%, 50%, 75% assist from helper) for task but is able to actively participate in task   Dependent = Pt. requires total assistance with task and is not able to actively participate with task completion     Functional Mobility:  Functional Mobility  Functional - Mobility Device: No device  Activity: Other(towards doorway and back to bed)  Assist Level: Supervision  Functional Mobility Comments: did not have cane, pt reports her ambulation is baseline  Transfers  Sit to stand: Supervision  Stand to sit: Supervision    Bed Mobility  Bed mobility  Comment: pt seated upon OT arrival in bed and after evaluation    Seated and Standing Balance:  Balance  Sitting Balance: Independent  Standing Balance: Modified independent     Functional Endurance:  Activity Tolerance  Activity Tolerance: Patient Tolerated treatment well    D/C Recommendations:  OT D/C RECOMMENDATIONS  REQUIRES OT FOLLOW UP: No    Equipment Recommendations:       OT Education:   OT Education  OT Education: OT Role    OT Follow Up:  OT D/C RECOMMENDATIONS  REQUIRES OT FOLLOW UP: No       Assessment/Discharge Disposition:  Assessment: Pt is a 49 y/o woman from home with spouse and two daughters who presents to [facility] with COVID-19 and the above deficits.  After initial assessment, pt functioning at baseline and would not benefit from further OT services at this time. Performance deficits / Impairments: Decreased strength, Decreased endurance  Prognosis: Good  No Skilled OT: Independent with functional mobility, Independent with ADL's, At baseline function  Decision Making: Low Complexity  History: Pt's medical history is moderately complex  Exam: 2 perf deficits  Assistance / Modification: Supervision-Independent    Six Click Score   How much help for putting on and taking off regular lower body clothing?: None  How much help for Bathing?: None  How much help for Toileting?: None  How much help for putting on and taking off regular upper body clothing?: None  How much help for taking care of personal grooming?: None  How much help for eating meals?: None  AM-[facility] Inpatient Daily Activity Raw Score: 24  AM-PAC Inpatient ADL T-Scale Score : 57.54  ADL Inpatient CMS 0-100% Score: 0      Patient Goal: Patient goals :  \"To breathe better\"    Discussed and agreed upon: Yes Comments:     Therapy Time:   OT Individual Minutes  Time In: 0940  Time Out: 4926  Minutes: 10    Eval: 10 minutes     Electronically signed by:    Skip Paget, OTR/L  4/19/2021, 11:15 AM

## 2021-04-19 NOTE — PROGRESS NOTES
Infectious Disease     Patient Name: Kishor Peralta  Date: 4/19/2021  YOB: 1970  Medical Record Number: [id number]        COVID-19 pneumonia    96% on 3 L/min      Review of Systems   Constitutional: Negative for chills, diaphoresis, fatigue and fever. Respiratory: Positive for cough and shortness of breath. Cardiovascular: Negative. Gastrointestinal: Negative. Physical Exam   Constitutional: She appears distressed. Morbidly obese   Cardiovascular: Normal heart sounds. No murmur heard. Pulmonary/Chest: Effort normal. No respiratory distress. She has no wheezes. She has no rales. She exhibits no tenderness. Abdominal: Soft. She exhibits no distension and no mass. There is no abdominal tenderness. There is no rebound and no guarding. Blood pressure 127/73, pulse 85, temperature 97.7 °F (36.5 °C), resp. rate 19, height 5' 6\" (1.676 m), weight (!) 329 lb 12.9 oz (149.6 kg), SpO2 96 %, not currently breastfeeding.       .   Lab Results   Component Value Date    WBC 7.9 04/19/2021    HGB 12.0 04/19/2021    HCT 38.0 04/19/2021    MCV 74.2 (L) 04/19/2021     04/19/2021     Lab Results   Component Value Date     04/19/2021    K 3.9 04/19/2021    K 4.5 04/15/2021    CL 99 04/19/2021    CO2 25 04/19/2021    BUN 21 04/19/2021    CREATININE 0.97 04/19/2021    GLUCOSE 252 04/19/2021    CALCIUM 9.1 04/19/2021            PLAN:    COVID-19 pneumonia   dexamethasone remdesivir   Actemra

## 2021-04-19 NOTE — PROGRESS NOTES
Progress Note  Date:2021       Room:[facility]  Patient Name:Abby Bertrand     YOB: 1970     Age:50 y.o. Subjective    Subjective:  Symptoms:  She reports malaise, cough and weakness. No shortness of breath, chest pain, headache, chest pressure, anorexia, diarrhea or anxiety. Diet:  No nausea or vomiting. Review of Systems   Respiratory: Positive for cough. Negative for shortness of breath. Cardiovascular: Negative for chest pain. Gastrointestinal: Negative for anorexia, diarrhea, nausea and vomiting. Neurological: Positive for weakness. Objective         Vitals Last 24 Hours:  TEMPERATURE:  Temp  Av.8 °F (36.6 °C)  Min: 97.7 °F (36.5 °C)  Max: 97.9 °F (36.6 °C)  RESPIRATIONS RANGE: Resp  Av.8  Min: 16  Max: 20  PULSE OXIMETRY RANGE: SpO2  Av.1 %  Min: 94 %  Max: 98 %  PULSE RANGE: Pulse  Av  Min: 85  Max: 94  BLOOD PRESSURE RANGE: Systolic (68PVY), HLB:419 , Min:127 , NZY:146   ; Diastolic (71LZY), KLF:84, Min:66, Max:91    I/O (24Hr): Intake/Output Summary (Last 24 hours) at 2021 1503  Last data filed at 2021 1749  Gross per 24 hour   Intake 240 ml   Output --   Net 240 ml     Objective:  General Appearance:  Comfortable, well-appearing and in no acute distress. Vital signs: (most recent): Blood pressure 127/73, pulse 85, temperature 97.7 °F (36.5 °C), resp. rate 19, height 5' 6\" (1.676 m), weight (!) 329 lb 12.9 oz (149.6 kg), SpO2 96 %, not currently breastfeeding. HEENT: Normal HEENT exam.    Lungs:  Normal effort. Heart: Normal rate. Regular rhythm. S1 normal and S2 normal.    Abdomen: Abdomen is soft. Bowel sounds are normal.   There is no epigastric area or suprapubic area tenderness. Pulses: Distal pulses are intact. Neurological: Patient is alert. Pupils:  Pupils are equal, round, and reactive to light. Skin:  Warm.       Labs/Imaging/Diagnostics    Labs:  CBC:  Recent Labs     21  5724 04/19/21  0610   WBC 9.1 7.9   RBC 4.92 5.12   HGB 11.6* 12.0   HCT 36.2* 38.0   MCV 73.5* 74.2*   RDW 17.6* 17.9*    373     CHEMISTRIES:  Recent Labs     04/17/21  0616 04/19/21  0610    136   K 4.3 3.9    99   CO2 24 25   BUN 21* 21*   CREATININE 0.91* 0.97*   GLUCOSE 267* 252*   MG 1.4* 1.6*     PT/INR:No results for input(s): PROTIME, INR in the last 72 hours. APTT:No results for input(s): APTT in the last 72 hours. LIVER PROFILE:  Recent Labs     04/17/21  0616   AST 37*   ALT 41*   BILITOT 0.4   ALKPHOS 117       Imaging Last 24 Hours:  No results found. Assessment//Plan           Hospital Problems           Last Modified POA    Respiratory failure ([facility] [address] 4/12/2021 Yes    Pneumonia due to COVID-19 virus 4/15/2021 Yes      LEI resolved  DM2 with hyperglycemia  COVID PNA  Acute respiratory hypoxia. Assessment & Plan  4/19: Patient is on less oxygen therapy compared to before. Will order PT OT. Patient PT OT will decide patient discharging plan. Continue with tapering of oxygen. Patient feeling better compared to admission. Out of ICU.   Electronically signed by Heriberto Rebollar MD on 4/19/21 at 3:03 PM EDT

## 2021-04-19 NOTE — PROGRESS NOTES
Physical Therapy Med Surg Initial Assessment  Facility/Department: [address]  Room: [place] [id number]       NAME: Darwin Foley  : 1970 (48 y.o.)  MRN: [id number]  CODE STATUS: Full Code    Date of Service: 2021    Patient Diagnosis(es): Respiratory failure [facility]) [J96.90]   Chief Complaint   Patient presents with    Shortness of Breath     since Friday    Fever     Patient Active Problem List    Diagnosis Date Noted    Pneumonia due to COVID-19 virus     Respiratory failure ([address] 2021    Brow ptosis 2016    Long-term insulin use ([address] 2016    Nuclear sclerotic cataract of both eyes 2016    Background retinopathy due to secondary diabetes ([address] 10/16/2014    Age related cataract 10/16/2014    Type 1 diabetes mellitus ([address] 2014    Obesity 2013    Panic attacks 2012    Pilonidal cyst     Vitamin D deficiency 2011    Chronic lymphocytic thyroiditis 2002    Acquired hypothyroidism 2002        Past Medical History:   Diagnosis Date    Arthritis     right knee and ankle    Pilonidal cyst     Thyroid disease     hypothyroid    Type 1 diabetes ([address]      Past Surgical History:   Procedure Laterality Date    APPENDECTOMY      ARTHROSCOPY / ARTHROTOMY KNEE Right 2016    RIGHT KNEE ARTHROSCOPY / MEDIAL MENISCUS TEAR / SUPINE  performed by Hayden Vogel MD at [address] [place] [address]       Chart Reviewed: Yes  Patient assessed for rehabilitation services?: Yes  Family / Caregiver Present: No    Restrictions:  Restrictions/Precautions: Isolation(droplet isolation; medium falls risk)     SUBJECTIVE:      Pain   0/10    Post Treatment Pain Screening:   Pain Screening  Patient Currently in Pain: No  Pain Assessment  Pain Level: 0    Prior Level of Function:  Social/Functional History  Lives With: Spouse, Family(2 daughters)  Type of Home: House  Home Layout: Two level, Bed/Bath upstairs(8 steps)  Home Access: Stairs to enter with rails  Entrance Stairs - Number of Steps: 1  Bathroom Shower/Tub: Tub/Shower unit  Home Equipment: [employer]  ADL Assistance: Independent  Homemaking Assistance: Independent  Homemaking Responsibilities: Yes  Ambulation Assistance: Independent(cane)  Transfer Assistance: Independent  Active : No  Patient's  Info: spouse or daughters  Occupation: On disability  Type of occupation:   Leisure & Hobbies: quilting, reading, puzzles    OBJECTIVE:   Vision: Impaired  Vision Exceptions: Wears glasses at all times  Hearing: Within functional limits    Cognition:  Overall Orientation Status: Within Functional Limits  Follows Commands: Within Functional Limits         ROM:  RLE PROM: WFL  LLE PROM: WFL    Strength:  Strength RLE  Strength RLE: WFL  Strength LLE  Strength LLE: WFL    Neuro:  Balance  Sitting - Static: Good  Sitting - Dynamic: Good  Standing - Static: Good  Standing - Dynamic: Good             Bed mobility  Bridging: Independent  Rolling to Left: Modified independent  Rolling to Right: Modified independent  Supine to Sit: Modified independent  Sit to Supine: Modified independent  Scooting: Modified independent  Comment: HOB elevated    Transfers  Sit to Stand: Independent  Stand to sit: Minimal Assistance    Ambulation  Ambulation?: Yes  More Ambulation?: Yes  Ambulation 1  Device: No Device  Assistance: Independent  Quality of Gait: mild increase sway, no LOB  Distance: 30 feet x 2 in room  Comments: pt reports she is at her baseline and feels safe to  return to home at this level    Stairs/Curb  Stairs?: No(pt in isolation however she reports she feels safe and able to perform stairs at this level of function)         Activity Tolerance  Activity Tolerance: Patient Tolerated treatment well          PT Education  PT Education: PT Role;Plan of Care    ASSESSMENT:   Decision Making: Low Complexity  History: low  Exam: low  Clinical Presentation: low  No Skilled PT: Safe

## 2021-04-19 NOTE — FLOWSHEET NOTE
Pt currently up in the chair. Pt has been weaned down to regular nasal cannula and states she feels good. Pt is on her menses. Call bell in reach.

## 2021-04-20 VITALS
DIASTOLIC BLOOD PRESSURE: 67 MMHG | BODY MASS INDEX: 47.09 KG/M2 | OXYGEN SATURATION: 95 % | HEIGHT: 66 IN | WEIGHT: 293 LBS | RESPIRATION RATE: 18 BRPM | SYSTOLIC BLOOD PRESSURE: 135 MMHG | TEMPERATURE: 98.2 F | HEART RATE: 80 BPM

## 2021-04-20 LAB
GLUCOSE BLD-MCNC: 353 MG/DL (ref 60–115)
GLUCOSE BLD-MCNC: 374 MG/DL (ref 60–115)
PERFORMED ON: ABNORMAL
PERFORMED ON: ABNORMAL

## 2021-04-20 PROCEDURE — 6370000000 HC RX 637 (ALT 250 FOR IP): Performed by: INTERNAL MEDICINE

## 2021-04-20 PROCEDURE — 6360000002 HC RX W HCPCS: Performed by: INTERNAL MEDICINE

## 2021-04-20 PROCEDURE — 99232 SBSQ HOSP IP/OBS MODERATE 35: CPT | Performed by: INTERNAL MEDICINE

## 2021-04-20 PROCEDURE — 2700000000 HC OXYGEN THERAPY PER DAY

## 2021-04-20 PROCEDURE — 2500000003 HC RX 250 WO HCPCS: Performed by: INTERNAL MEDICINE

## 2021-04-20 RX ORDER — DEXAMETHASONE 6 MG/1
6 TABLET ORAL
Qty: 3 TABLET | Refills: 0 | Status: SHIPPED | OUTPATIENT
Start: 2021-04-20 | End: 2021-04-23

## 2021-04-20 RX ADMIN — DEXAMETHASONE SODIUM PHOSPHATE 6 MG: 4 INJECTION, SOLUTION INTRA-ARTICULAR; INTRALESIONAL; INTRAMUSCULAR; INTRAVENOUS; SOFT TISSUE at 10:04

## 2021-04-20 RX ADMIN — INSULIN LISPRO 15 UNITS: 100 INJECTION, SOLUTION INTRAVENOUS; SUBCUTANEOUS at 09:35

## 2021-04-20 RX ADMIN — QUETIAPINE FUMARATE 50 MG: 50 TABLET ORAL at 10:04

## 2021-04-20 RX ADMIN — OYSTER SHELL CALCIUM WITH VITAMIN D 1 TABLET: 500; 200 TABLET, FILM COATED ORAL at 10:03

## 2021-04-20 RX ADMIN — ENOXAPARIN SODIUM 80 MG: 80 INJECTION SUBCUTANEOUS at 10:04

## 2021-04-20 RX ADMIN — PREGABALIN 75 MG: 75 CAPSULE ORAL at 10:03

## 2021-04-20 RX ADMIN — PANTOPRAZOLE SODIUM 40 MG: 40 TABLET, DELAYED RELEASE ORAL at 06:22

## 2021-04-20 RX ADMIN — LISINOPRIL 15 MG: 10 TABLET ORAL at 10:04

## 2021-04-20 RX ADMIN — INSULIN LISPRO 15 UNITS: 100 INJECTION, SOLUTION INTRAVENOUS; SUBCUTANEOUS at 13:44

## 2021-04-20 RX ADMIN — LEVOTHYROXINE SODIUM 150 MCG: 0.07 TABLET ORAL at 06:22

## 2021-04-20 RX ADMIN — TRAMADOL HYDROCHLORIDE 100 MG: 50 TABLET ORAL at 06:23

## 2021-04-20 RX ADMIN — GUAIFENESIN 200 MG: 100 SOLUTION ORAL at 10:11

## 2021-04-20 ASSESSMENT — PAIN SCALES - GENERAL: PAINLEVEL_OUTOF10: 4

## 2021-04-20 NOTE — CARE COORDINATION
SPOKE WITH PATIENT AND SHE DECLINES NEED FOR HHC AS HER  WILL BE HOME WITH HER FOR 2 WEEKS AND HER SISTER IS A NURSE ALSO. 02 SET UP AND ARRANGED WITH MEDICAL SERVICES. 02 TANK TO BE GIVEN BY RN.

## 2021-04-20 NOTE — PROGRESS NOTES
INPATIENT PROGRESS NOTES    PATIENT NAME: Ramana Cheng  MRN: [id number]  SERVICE DATE:  April 20, 2021   SERVICE TIME:  2:41 PM      PRIMARY SERVICE: Pulmonary Disease    CHIEF COMPLAIN: COVID-19 infection      INTERVAL HPI: Patient seen and examined at bedside, Interval Notes, orders reviewed. Nursing notes noted  Patient is feeling better. Going home today. Oxygen arrangement done. Discussed with RN She denies having shortness of breath. No chest pain. No cough or sputum production. No nausea vomiting diarrhea. OBJECTIVE    Body mass index is 53.23 kg/m². PHYSICAL EXAM:  Vitals:  /67   Pulse 80   Temp 98.2 °F (36.8 °C) (Oral)   Resp 18   Ht 5' 6\" (1.676 m)   Wt (!) 329 lb 12.9 oz (149.6 kg)   SpO2 95%   BMI 53.23 kg/m²   General:Alert, awake . comfortable in bed, No distress. Head: Atraumatic , Normocephalic   Eyes: PERRL. No sclera icterus. No conjunctival injection. No discharge   ENT: No nasal  discharge. Pharynx clear. Neck:  Trachea midline. No thyromegaly, no JVD, No cervical adenopathy. Chest : Bilaterally symmetrical ,Normal effort,  No accessory muscle use  Lung : . Fair BS bilateral, decreased BS at bases. No Rales. No wheezing. No rhonchi. Heart[de-identified] Normal  rate. Regular rhythm. No mumur ,  Rub or gallop  ABD: Non-tender. Non-distended. No masses. No organmegaly. Normal bowel sounds. No hernia. Ext : No Pitting both leg , No Cyanosis No clubbing  Neuro: no focal weakness    DATA:   Recent Labs     04/19/21  0610   WBC 7.9   HGB 12.0   HCT 38.0   MCV 74.2*        Recent Labs     04/19/21  0610      K 3.9   CL 99   CO2 25   BUN 21*   CREATININE 0.97*   GLUCOSE 252*   CALCIUM 9.1   LABGLOM >60.0   GFRAA >60.0       MV Settings:     FiO2 : 75 %    No results for input(s): PHART, QNO0QVN, PO2ART, JYD4CWR, BEART, K8QWSHQX in the last 72 hours.     O2 Device: Nasal cannula  O2 Flow Rate (L/min): 3 L/min    DIET CARB CONTROL; Carb Control: 4 carb choices (60 gms)/meal; LIMITED EXAMINATION DUE TO SUBOPTIMAL OPACIFICATION. WITHIN THE LIMITS OF THE EXAMINATION NO GROSS CENTRAL OR GROSS PROXIMAL PULMONARY EMBOLI. 2. THERE IS PATCHY MULTIFOCAL TO CONFLUENT AIRSPACE DISEASE THROUGHOUT THE LUNG PARENCHYMA BILATERALLY. COMMONLY REPORT IMAGING FEATURES OF (COVID-19) PNEUMONIA ARE PRESENT. OTHER PROCESSES SUCH AS INFLUENZA, PNEUMONIA AND ORGANIZING PNEUMONIA AS CAN BE SEEN WITH DRUG TOXICITY AND CONNECTIVE TISSUE DISEASE CAN CAUSE A SIMILAR IMAGING PATTERN. 3. CHOLELITHIASIS 4. OTHER FINDINGS DETAILED ABOVE All CT scans at this facility use dose modulation, iterative reconstruction, and/or weight based dosing when appropriate to reduce radiation dose to as low as reasonably achievable. Xr Chest Portable    Result Date: 4/13/2021  EXAMINATION: XR CHEST PORTABLE CLINICAL HISTORY: SHORTNESS OF BREATH COMPARISONS: MARCH 4, 2017 FINDINGS: Image performed in partial expiration. Cardiopericardial silhouette enlarged. Patchy area of increased opacity left upper, mid and lower lung. Pulmonary vasculature indistinct. CARDIOMEGALY. LEFT LUNG ATELECTASIS/PNEUMONIA, PULMONARY VENOUS CONGESTION, IN THIS PARTIALLY EXPIRATORY CHEST RADIOGRAPH    Us Dup Upper Extremity Right Venous    Result Date: 4/14/2021  US DUP LOWER EXTREMITIES BILATERAL VENOUS, US DUP UPPER EXTREMITY RIGHT VENOUS, US DUP UPPER EXTREMITY LEFT VENOUS : 4/13/2021 CLINICAL HISTORY:  dimer . COMPARISON: None available. Grayscale, compression, color and waveform Doppler analysis of both upper and lower extremity deep venous systems was performed with augmentation. FINDINGS: The study is somewhat limited by the patient's body habitus and some overlying bandages. There is no deep venous thrombosis, abnormal masses, fluid collections or other findings of concern identified within either upper or lower extremity. NO DVT IDENTIFIED IN EITHER UPPER OR LOWER EXTREMITY.      Us Dup Upper Extremity Left Venous    Result Date: 4/14/2021  US DUP LOWER EXTREMITIES BILATERAL VENOUS, US DUP UPPER EXTREMITY RIGHT VENOUS, US DUP UPPER EXTREMITY LEFT VENOUS : 4/13/2021 CLINICAL HISTORY:  dimer . COMPARISON: None available. Grayscale, compression, color and waveform Doppler analysis of both upper and lower extremity deep venous systems was performed with augmentation. FINDINGS: The study is somewhat limited by the patient's body habitus and some overlying bandages. There is no deep venous thrombosis, abnormal masses, fluid collections or other findings of concern identified within either upper or lower extremity. NO DVT IDENTIFIED IN EITHER UPPER OR LOWER EXTREMITY. Us Dup Lower Extremities Bilateral Venous    Result Date: 4/14/2021  US DUP LOWER EXTREMITIES BILATERAL VENOUS, US DUP UPPER EXTREMITY RIGHT VENOUS, US DUP UPPER EXTREMITY LEFT VENOUS : 4/13/2021 CLINICAL HISTORY:  dimer . COMPARISON: None available. Grayscale, compression, color and waveform Doppler analysis of both upper and lower extremity deep venous systems was performed with augmentation. FINDINGS: The study is somewhat limited by the patient's body habitus and some overlying bandages. There is no deep venous thrombosis, abnormal masses, fluid collections or other findings of concern identified within either upper or lower extremity. NO DVT IDENTIFIED IN EITHER UPPER OR LOWER EXTREMITY. IMPRESSION AND SUGGESTION:  1. COVID-19 pneumonia. 2. Acute respiratory failure with hypoxia improving  3. Elevated D-dimer secondary to Covid    Continue O2 to keep saturation 90% or above. She completed her course of remdesivir. She is on Decadron. I going home with O2. Follow-up in office 3 to 4 weeks. NOTE: This report was transcribed using voice recognition software. Every effort was made to ensure accuracy; however, inadvertent computerized transcription errors may be present.       Electronically signed by Earnest Ferreira MD, FCCP on 4/20/2021 at 2:41 PM

## 2021-04-20 NOTE — CARE COORDINATION
Met with patient via phone. Definition of pneumonia discussed. Causes of different types reviewed. Symptoms discussed and pt does understand that she may have some or all of these symptoms. Testing to diagnose pneumonia reviewed as well as possible treatments. Importance of avoiding infections discussed including: taking medication as directed, washing hands, disposing of used tissues, getting the pneumonia and flu vaccines, and avoiding others who are ill. She had not gotten her flu or pneumonia vaccines before, but was planning on it this fall. She had gotten her first covid vaccine before being admitted to the hospital.  Importance of not smoking and to avoid others who may be smoking around patient stressed. Pt. does not smoke. Pneumonia Zones also reviewed. \"Green\" zone is the goal, \"Yellow\" zone means to call the doctor, and \"Red\" zone means to call the doctor ASAP or call 911. Copies of Pneumonia booklet and Zone Pamphlet given to patient by primary nurse. Pt declines having further questions at this time.   Electronically signed by Earl Lott RN on 4/20/2021 at 11:30 AM

## 2021-04-20 NOTE — PROGRESS NOTES
Nutrition Assessment     Type and Reason for Visit: Initial, RD Nutrition Re-Screen/LOS(LOS)    Nutrition Recommendations/Plan:   Continue Carb Control 4, discontinue diabetic ONS     Nutrition Assessment:  Pt admitted for respiratory failure, po intake initially suboptimal while in ICU and diabetic ONS started.   PO intake has since improved, will discontinue ONS and continue to monitor while inpatient    Current Nutrition Therapies:    Dietary Nutrition Supplements: Diabetic Oral Supplement  DIET CARB CONTROL; Carb Control: 4 carb choices (60 gms)/meal; Safety Tray; Safety Tray (Disposables) (51-75%/%)    Anthropometric Measures:  · Height: 5' 6\" (167.6 cm)  · Current Body Wt: 239 lb (108.4 kg)(4/16-? accuracey of current wt)   · BMI: 38.6    Nutrition Diagnosis:   · Altered nutrition-related lab values related to endocrine dysfuntion as evidenced by lab values    Nutrition Interventions:   Food and/or Nutrient Delivery:  Discontinue Oral Nutrition Supplement, Continue Current Diet(Continue Carb Control 4, discontinue diabetic ONS)  Nutrition Education/Counseling:  No recommendation at this time   Coordination of Nutrition Care:  Continue to monitor while inpatient    Goals:  po intake > 75% of meals and supplements, gluc < 180       Nutrition Monitoring and Evaluation:   Food/Nutrient Intake Outcomes:  Food and Nutrient Intake  Physical Signs/Symptoms Outcomes:  Biochemical Data, Weight     Electronically signed by Reji Currie RD, LD on 4/20/21 at 1:55 PM EDT Preop diagnosis: Right inguinal lymphadenopathy    Postoperative diagnosis: Pending pathology    Procedure: Excisional biopsy of cervical lymph node    Surgeon:  CRISTIAN Carey MD    Assistant: JOSH Fowler    Anesthesia: GET    Specimen: Right Inguinal Lymph node    Findings: Enlarged right inguinal lymph node    EBL: Minimal    Complications: None    Indications: This is a pleasant 47-year-old female patient who is presenting with right inguinal lymphadenopathy.    Procedure:    After patient was sedated, prepped and draped in the usual sterile fashion.  Patient had a palpable right inguinal lymph node.  The lymph node had been preoperatively marked and was anesthetized with quarter percent Marcaine and epinephrine.  Using a 15 blade scalpel a linear skin incision was performed over the palpable lymph node.  The subcutaneous tissues dissected using cautery.  The lymph node was grasped with an allis grasper and excised in a circumferential manner using cautery and submitted to pathology for flow cytology and histology.  The wound was palpated and there were other smaller lymph nodes.  The wound was then irrigated with normal saline.  Wound was closed in a 2 layer fashion closing subcutaneous tissue with 3-0 Vicryl and the skin with 4-0 Vicryl sutures.  A sterile dressing was applied.  All needles and sponge counts were correct ×2.  Patient was transferred to recovery room in stable condition.

## 2021-04-20 NOTE — PROGRESS NOTES
INPATIENT PROGRESS NOTES    PATIENT NAME: Perla Valenzuela  MRN: [id number]  SERVICE DATE:  April 19, 2021   SERVICE TIME:  9:43 PM      PRIMARY SERVICE: Pulmonary Disease    CHIEF COMPLAIN: COVID-19 infection      INTERVAL HPI: Patient seen and examined at bedside, Interval Notes, orders reviewed. Nursing notes noted  Patient is feeling better. She is currently on O2 via nasal cannula 3 L and O2 saturation 96%. She denies having shortness of breath. No chest pain. No cough or sputum production. No nausea vomiting diarrhea. OBJECTIVE    Body mass index is 53.23 kg/m². PHYSICAL EXAM:  Vitals:  BP (!) 143/71   Pulse 88   Temp 98.2 °F (36.8 °C)   Resp 19   Ht 5' 6\" (1.676 m)   Wt (!) 329 lb 12.9 oz (149.6 kg)   SpO2 96%   BMI 53.23 kg/m²   General:Alert, awake . comfortable in bed, No distress. Head: Atraumatic , Normocephalic   Eyes: PERRL. No sclera icterus. No conjunctival injection. No discharge   ENT: No nasal  discharge. Pharynx clear. Neck:  Trachea midline. No thyromegaly, no JVD, No cervical adenopathy. Chest : Bilaterally symmetrical ,Normal effort,  No accessory muscle use  Lung : . Fair BS bilateral, decreased BS at bases. No Rales. No wheezing. No rhonchi. Heart[de-identified] Normal  rate. Regular rhythm. No mumur ,  Rub or gallop  ABD: Non-tender. Non-distended. No masses. No organmegaly. Normal bowel sounds. No hernia.   Ext : No Pitting both leg , No Cyanosis No clubbing  Neuro: no focal weakness    DATA:   Recent Labs     04/17/21  0616 04/19/21  0610   WBC 9.1 7.9   HGB 11.6* 12.0   HCT 36.2* 38.0   MCV 73.5* 74.2*    373     Recent Labs     04/17/21  0616 04/19/21  0610    136   K 4.3 3.9    99   CO2 24 25   BUN 21* 21*   CREATININE 0.91* 0.97*   GLUCOSE 267* 252*   CALCIUM 8.6 9.1   PROT 6.4  --    LABALBU 2.9*  --    BILITOT 0.4  --    ALKPHOS 117  --    AST 37*  --    ALT 41*  --    LABGLOM >60.0 >60.0   GFRAA >60.0 >60.0   GLOB 3.5  --        MV Settings:     FiO2 : 75 %    No results for input(s): PHART, XWZ1JYF, PO2ART, TYO7RZJ, BEART, W7VLLYVQ in the last 72 hours. O2 Device: Nasal cannula  O2 Flow Rate (L/min): 3 L/min    Dietary Nutrition Supplements: Diabetic Oral Supplement  DIET CARB CONTROL; Carb Control: 4 carb choices (60 gms)/meal; Safety Tray; Safety Tray (Disposables)     MEDICATIONS during current hospitalization:    Continuous Infusions:   dextrose         Scheduled Meds:   lisinopril  15 mg Oral BID    insulin glargine  70 Units Subcutaneous Nightly    insulin lispro  5 Units Subcutaneous TID WC    traMADol  100 mg Oral Q12H    pregabalin  75 mg Oral BID    insulin lispro  0-18 Units Subcutaneous TID WC    insulin lispro  0-9 Units Subcutaneous Nightly    enoxaparin  0.5 mg/kg Subcutaneous BID    dexamethasone  6 mg Intravenous Q24H    pantoprazole  40 mg Oral QAM AC    levothyroxine  150 mcg Oral Daily    calcium-vitamin D  1 tablet Oral Daily    QUEtiapine  50 mg Oral BID       PRN Meds:sodium chloride, traMADol, traMADol, promethazine **OR** ondansetron, polyethylene glycol, acetaminophen **OR** acetaminophen, guaiFENesin, albuterol-ipratropium, glucose, dextrose, glucagon (rDNA), dextrose    Radiology  Cta Chest W Wo  (pe Study)    Result Date: 4/12/2021  The EXAMINATION: CT scan of the chest with contrast (pulmonary embolism protocol) INDICATION: Chest pain and shortness of breath. COMPARISON: None TECHNIQUE: Helical CT was performed through the chest utilizing 100 cc of Isovue-300 intravenous contrast.  Images were obtained with bolus tracking in order to opacify the pulmonary arteries. Both MIP and 3D volume rendered reconstructions were performed. FINDINGS: This is a limited examination due to suboptimal opacification. Within limits examination no gross central or proximal pulmonary emboli. There is patchy multifocal to confluent airspace disease throughout the lung parenchyma bilaterally. No pleural effusions. No pneumothoraces.  No significant Aortic, pretracheal, parahilar or subcarinal adenopathy. Within the field-of-view of the abdomen there is a small hiatal hernia. There is findings of multiple foci within the gallbladder most likely consistent with cholelithiasis. Osseous structures are intact. 1. LIMITED EXAMINATION DUE TO SUBOPTIMAL OPACIFICATION. WITHIN THE LIMITS OF THE EXAMINATION NO GROSS CENTRAL OR GROSS PROXIMAL PULMONARY EMBOLI. 2. THERE IS PATCHY MULTIFOCAL TO CONFLUENT AIRSPACE DISEASE THROUGHOUT THE LUNG PARENCHYMA BILATERALLY. COMMONLY REPORT IMAGING FEATURES OF (COVID-19) PNEUMONIA ARE PRESENT. OTHER PROCESSES SUCH AS INFLUENZA, PNEUMONIA AND ORGANIZING PNEUMONIA AS CAN BE SEEN WITH DRUG TOXICITY AND CONNECTIVE TISSUE DISEASE CAN CAUSE A SIMILAR IMAGING PATTERN. 3. CHOLELITHIASIS 4. OTHER FINDINGS DETAILED ABOVE All CT scans at this facility use dose modulation, iterative reconstruction, and/or weight based dosing when appropriate to reduce radiation dose to as low as reasonably achievable. Xr Chest Portable    Result Date: 4/13/2021  EXAMINATION: XR CHEST PORTABLE CLINICAL HISTORY: SHORTNESS OF BREATH COMPARISONS: MARCH 4, 2017 FINDINGS: Image performed in partial expiration. Cardiopericardial silhouette enlarged. Patchy area of increased opacity left upper, mid and lower lung. Pulmonary vasculature indistinct. CARDIOMEGALY. LEFT LUNG ATELECTASIS/PNEUMONIA, PULMONARY VENOUS CONGESTION, IN THIS PARTIALLY EXPIRATORY CHEST RADIOGRAPH    Us Dup Upper Extremity Right Venous    Result Date: 4/14/2021  US DUP LOWER EXTREMITIES BILATERAL VENOUS, US DUP UPPER EXTREMITY RIGHT VENOUS, US DUP UPPER EXTREMITY LEFT VENOUS : 4/13/2021 CLINICAL HISTORY:  dimer . COMPARISON: None available. Grayscale, compression, color and waveform Doppler analysis of both upper and lower extremity deep venous systems was performed with augmentation.  FINDINGS: The study is somewhat limited by the patient's body habitus and some overlying on O2 via nasal cannula. Chest x-ray and CT chest reviewed. Continue present treatment plan  I spent more than 35 min with this patient's care , greater the 50% of this time was spent in counseling and/or coordinating of care. NOTE: This report was transcribed using voice recognition software. Every effort was made to ensure accuracy; however, inadvertent computerized transcription errors may be present.       Electronically signed by Gracie Gutierrez MD, FCCP on 4/19/2021 at 9:43 PM

## 2021-04-21 ENCOUNTER — CARE COORDINATION (OUTPATIENT)
Dept: CASE MANAGEMENT | Age: 51
End: 2021-04-21

## 2021-04-21 DIAGNOSIS — U07.1 PNEUMONIA DUE TO COVID-19 VIRUS: Primary | ICD-10-CM

## 2021-04-21 DIAGNOSIS — J12.82 PNEUMONIA DUE TO COVID-19 VIRUS: Primary | ICD-10-CM

## 2021-04-21 NOTE — CARE COORDINATION
and explanation of the CTN role. CTN reviewed discharge instructions, medical action plan and red flags with patient who verbalized understanding. Patient given an opportunity to ask questions and does not have any further questions or concerns at this time. Were discharge instructions available to patient? Yes. Reviewed appropriate site of care based on symptoms and resources available to patient including: When to call 911. The patient agrees to contact the PCP office for questions related to their healthcare. Medication reconciliation was performed with patient, who verbalizes understanding of administration of home medications. Advised obtaining a 90-day supply of all daily and as-needed medications. Covid Risk Education    Patient has following risk factors of: diabetes. Education provided regarding infection prevention, and signs and symptoms of COVID-19 and when to seek medical attention with patient who verbalized understanding. Discussed exposure protocols and quarantine From CDC: Are you at higher risk for severe illness?   and given an opportunity for questions and concerns. The patient agrees to contact the COVID-19 hotline 789-825-1600 or PCP office for questions related to COVID-19. For more information on steps you can take to protect yourself, see CDC's How to Protect Yourself     Was patient discharged with a pulse oximeter? Yes Discussed and confirmed pulse oximeter discharge instructions and when to notify provider or seek emergency care. Patient/family/caregiver given information for [employer] and agrees to enroll yes  Patient's preferred e-mail: [email or web address]  Patient's preferred phone number: [phone]    Discussed follow-up appointments. If no appointment was previously scheduled, appointment scheduling offered: See above note. Is follow up appointment scheduled within 7 days of discharge?  Yes  Non-[facility] follow up appointment(s):     CTN provided contact information for future needs. Agreeable to [facility] CLP and routed for enrollment. CTN s/o. Care Transitions 24 Hour Call    Do you have any ongoing symptoms?: Yes  Patient-reported symptoms: Cough, Shortness of Breath  Do you have a copy of your discharge instructions?: Yes  Do you have all of your prescriptions and are they filled?: Yes  Have you scheduled your follow up appointment?: Yes  Were you discharged with any Home Care or Post Acute Services: No  Do you feel like you have everything you need to keep you well at home?: Yes  Care Transitions Interventions         Follow Up  Future Appointments   Date Time Provider Sandra Matthew   4/27/2021 12:30 PM Hari Dash APRN - [address]   5/28/2021  9:30 AM Sen Wynn MD [address] [place] [address]     Discharge Instructions for [address]    Your Emergency Department provider has diagnosed you or suspects you have the COVID-19 illness. At this time, you are medically safe to be discharged home. We are giving you a Pulse Oximeter to check your blood oxygen level. Normal blood oxygen level is between 93% to 100%. For patients like you with COVID-19, your oxygen level sometimes drops below 93%.  Please use the Pulse-Oximeter at home to check your blood oxygen level twice per day or anytime you have worsening shortness of breath.  Heres how you use the Pulse-Oximeter:        -Place the Pulse-Oximeter on your index finger (remove any nail polish if present). -Direct the red light downwards towards your finger, on top of your fingernail.  -Hold your finger still while the machine reads your blood oxygen level.  If you notice that your blood oxygen level stays below 88% while being active OR stays below 90% while sitting or standing, PLEASE RETURN IMMEDIATELY TO THE EMERGENCY DEPARTMENT.    If you normally require home Oxygen (even before you got COVID-19) and you notice that you need more than 4

## 2021-04-27 ENCOUNTER — VIRTUAL VISIT (OUTPATIENT)
Dept: FAMILY MEDICINE CLINIC | Age: 51
End: 2021-04-27
Payer: COMMERCIAL

## 2021-04-27 DIAGNOSIS — J12.82 PNEUMONIA DUE TO COVID-19 VIRUS: Primary | ICD-10-CM

## 2021-04-27 DIAGNOSIS — J96.01 ACUTE RESPIRATORY FAILURE WITH HYPOXIA (HCC): ICD-10-CM

## 2021-04-27 DIAGNOSIS — U07.1 PNEUMONIA DUE TO COVID-19 VIRUS: Primary | ICD-10-CM

## 2021-04-27 PROCEDURE — 99495 TRANSJ CARE MGMT MOD F2F 14D: CPT | Performed by: NURSE PRACTITIONER

## 2021-04-27 PROCEDURE — 1111F DSCHRG MED/CURRENT MED MERGE: CPT | Performed by: NURSE PRACTITIONER

## 2021-04-27 RX ORDER — TRAMADOL HYDROCHLORIDE 100 MG/1
TABLET, EXTENDED RELEASE ORAL
COMMUNITY
Start: 2021-01-18

## 2021-04-27 ASSESSMENT — ENCOUNTER SYMPTOMS
EYES NEGATIVE: 1
WHEEZING: 0
SHORTNESS OF BREATH: 0
GASTROINTESTINAL NEGATIVE: 1
COUGH: 1
SORE THROAT: 0
SINUS PRESSURE: 0
TROUBLE SWALLOWING: 0

## 2021-04-27 ASSESSMENT — PATIENT HEALTH QUESTIONNAIRE - PHQ9
2. FEELING DOWN, DEPRESSED OR HOPELESS: 0
SUM OF ALL RESPONSES TO PHQ QUESTIONS 1-9: 0
1. LITTLE INTEREST OR PLEASURE IN DOING THINGS: 0
SUM OF ALL RESPONSES TO PHQ QUESTIONS 1-9: 0

## 2021-04-27 NOTE — PROGRESS NOTES
Post-Discharge Transitional Care Management Services or Hospital Follow Up      Fady Chatman   YOB: 1970    Date of Office Visit:  4/27/2021  Date of Hospital Admission: 4/12/21  Date of Hospital Discharge: 4/20/21  Readmission Risk Score(high >=14%.  Medium >=10%):Readmission Risk Score: 18      Care management risk score Rising risk (score 2-5) and Complex Care (Scores >=6): 5     Non face to face  following discharge, date last encounter closed (first attempt may have been earlier): 4/21/2021 10:58 AM 4/21/2021 10:58 AM    Call initiated 2 business days of discharge: Yes     Patient Active Problem List   Diagnosis    Pilonidal cyst    Panic attacks    Obesity    Type 1 diabetes mellitus ([address]    Background retinopathy due to secondary diabetes ([address]    Chronic lymphocytic thyroiditis    Age related cataract    Acquired hypothyroidism    Vitamin D deficiency    Brow ptosis    Long-term insulin use ([address]    Nuclear sclerotic cataract of both eyes    Respiratory failure ([address]    Pneumonia due to COVID-19 virus       No Known Allergies    Medications [facility] Medication Instructions ELZA:    Printed on:04/27/21 1522   Medication Information                      albuterol sulfate  (90 Base) MCG/ACT inhaler  INHALE 2 PUFFS EVERY 4 TO 6 HOURS AS NEEDED             ALPRAZolam (XANAX) 0.5 MG tablet  TAKE 1 TABLET BY MOUTH THREE TIMES DAILY AS NEEDED for sleep or anxiety for up to 30 days             apixaban (ELIQUIS) 5 MG TABS tablet  Take 1 tablet by mouth 2 times daily             Calcium Carb-Cholecalciferol (CALCIUM 600 + D) 600-200 MG-UNIT TABS  one a day             HUMALOG 100 UNIT/ML injection vial  INJECT PER INSULIN PUMP    MAXIMUM DOSE 100 UNITS PER DAY SUBCUTANEOUSLY             insulin glargine (LANTUS) 100 UNIT/ML injection vial  40 units at bedtime             INSULIN INFUSION PUMP  by Does not apply route. Insulin Syringe-Needle U-100 ([employer] INSULIN SYRINGE) 31G X 5/16\" 0.5 ML MISC  1 each by Does not apply route daily             levothyroxine (SYNTHROID) 125 MCG tablet  2 po daily             lisinopril (PRINIVIL;ZESTRIL) 10 MG tablet  TAKE 1 TABLET DAILY             LYRICA 150 MG capsule  Place 150 mg into the right eye 2 times daily. metFORMIN (GLUCOPHAGE) 500 MG tablet  TAKE 1 TABLET TWICE DAILY  WITH MEALS             mometasone (ELOCON) 0.1 % cream  Apply topically daily.              pantoprazole (PROTONIX) 40 MG tablet  TAKE 1 TABLET DAILY             QUEtiapine (SEROQUEL) 50 MG tablet  TAKE 1 TABLET EVERY MORNINGAND TAKE 2 TABLETS EVERY   EVENING             traMADol (ULTRAM ER) 100 MG extended release tablet  Take 1 tablet by mouth every night as needed for pain             traZODone (DESYREL) 50 MG tablet  TAKE 3 TABLETS AT BEDTIME                   Medications marked \"taking\" at this time  Outpatient Medications Marked as Taking for the 4/27/21 encounter (Virtual Visit) with ADRIANA Patterson CNP   Medication Sig Dispense Refill    traMADol (ULTRAM ER) 100 MG extended release tablet Take 1 tablet by mouth every night as needed for pain      apixaban (ELIQUIS) 5 MG TABS tablet Take 1 tablet by mouth 2 times daily 60 tablet 1    metFORMIN (GLUCOPHAGE) 500 MG tablet TAKE 1 TABLET TWICE DAILY  WITH MEALS 180 tablet 2    HUMALOG 100 UNIT/ML injection vial INJECT PER INSULIN PUMP    MAXIMUM DOSE 100 UNITS PER DAY SUBCUTANEOUSLY 90 mL 2    ALPRAZolam (XANAX) 0.5 MG tablet TAKE 1 TABLET BY MOUTH THREE TIMES DAILY AS NEEDED for sleep or anxiety for up to 30 days 90 tablet 2    levothyroxine (SYNTHROID) 125 MCG tablet 2 po daily 180 tablet 03    traZODone (DESYREL) 50 MG tablet TAKE 3 TABLETS AT BEDTIME 270 tablet 3    lisinopril (PRINIVIL;ZESTRIL) 10 MG tablet TAKE 1 TABLET DAILY 90 tablet 3    pantoprazole (PROTONIX) 40 MG tablet TAKE 1 TABLET DAILY 90 tablet 3  QUEtiapine (SEROQUEL) 50 MG tablet TAKE 1 TABLET EVERY MORNINGAND TAKE 2 TABLETS EVERY   EVENING 270 tablet 3    albuterol sulfate  (90 Base) MCG/ACT inhaler INHALE 2 PUFFS EVERY 4 TO 6 HOURS AS NEEDED  0    mometasone (ELOCON) 0.1 % cream Apply topically daily. 45 g 2    Calcium Carb-Cholecalciferol (CALCIUM 600 + D) 600-200 MG-UNIT TABS one a day      LYRICA 150 MG capsule Place 150 mg into the right eye 2 times daily. 0    insulin glargine (LANTUS) 100 UNIT/ML injection vial 40 units at bedtime (Patient taking differently: Indications: Pt to use as backup if insulin pump fails 40 units at bedtime) 40 mL 3    Insulin Syringe-Needle U-100 ([employer] INSULIN SYRINGE) 31G X 5/16\" 0.5 ML MISC 1 each by Does not apply route daily 300 each 3    INSULIN INFUSION PUMP by Does not apply route. Medications patient taking as of now reconciled against medications ordered at time of hospital discharge: Yes    Chief Complaint   Patient presents with    Follow-Up from Hospital     Pt. is following up from an ER visit to [facility] from 04/12/2021-04/20/2021 for Covid, Hypoxia, Pneumonia due to organism and Acute respiratory failure with hypoxia. HPI    Inpatient course: Discharge summary reviewed- see chart. Interval history/Current status: stable    Review of Systems   Constitutional: Positive for fatigue. Negative for activity change, appetite change, chills, diaphoresis, fever and unexpected weight change. HENT: Negative for congestion, sinus pressure, sore throat and trouble swallowing. Eyes: Negative. Respiratory: Positive for cough. Negative for shortness of breath and wheezing. Cardiovascular: Negative for chest pain, palpitations and leg swelling. Gastrointestinal: Negative. Genitourinary: Negative. Musculoskeletal: Negative. Skin: Negative. Neurological: Negative for dizziness, syncope, weakness, light-headedness and headaches. Psychiatric/Behavioral: Negative. There were no vitals filed for this visit. There is no height or weight on file to calculate BMI. Wt Readings from Last 3 Encounters:   04/16/21 (!) 329 lb 12.9 oz (149.6 kg)   01/13/21 (!) 357 lb (161.9 kg)   11/17/20 (!) 351 lb 9.6 oz (159.5 kg)     BP Readings from Last 3 Encounters:   04/19/21 135/67   01/13/21 139/79   11/17/20 138/74       Physical Exam  Constitutional:       General: She is not in acute distress. Pulmonary:      Effort: No respiratory distress. Neurological:      Mental Status: She is alert and oriented to person, place, and time. Psychiatric:         Mood and Affect: Mood normal.         Behavior: Behavior normal.             Assessment/Plan:  1. Pneumonia due to COVID-19 virus    - [place] DISCHARGE MEDS RECONCILED W/ CURRENT OUTPATIENT MED LIST    2. Acute respiratory failure with hypoxia (HCC)  -continues on 02 at 2.5L  -F/u pulm 3 weeks  - [place] DISCHARGE MEDS RECONCILED W/ CURRENT OUTPATIENT MED LIST        Medical Decision Making: moderate complexity           TELEHEALTH EVALUATION -- Audio/Visual (During GOIYW-02 public health emergency)    -   Nhi Mann is a 48 y.o. female being evaluated by a Virtual Visit (video visit) encounter to address concerns as mentioned above. A caregiver was present when appropriate. Due to this being a TeleHealth encounter (During YGOZC-90 public health emergency), evaluation of the following organ systems was limited: Vitals/Constitutional/EENT/Resp/CV/GI//MS/Neuro/Skin/Heme-Lymph-Imm. Pursuant to the emergency declaration under the [facility], [address] [place] [address] [facility] authority and the [employer] Act, this Virtual Visit was conducted with patient's (and/or legal guardian's) consent, to reduce the patient's risk of exposure to COVID-19 and provide necessary medical care.   The patient (and/or legal guardian) has also been advised to contact this office for worsening conditions or problems, and seek emergency medical treatment and/or call 911 if deemed necessary. Services were provided through a video synchronous discussion virtually to substitute for in-person clinic visit. Type of encounter was x__ Doxy __ MyChart ___Facetime    Patient was located at their home. Provider was located at their ___ home or        __x__ office. --ADRIANA Smith CNP on 4/27/2021 at 11:59 AM    An electronic signature was used to authenticate this note.

## 2021-04-29 ENCOUNTER — CARE COORDINATION (OUTPATIENT)
Dept: CASE MANAGEMENT | Age: 51
End: 2021-04-29

## 2021-04-29 NOTE — CARE COORDINATION
Yes  Non-[facility] follow up appointment(s): n/a    Plan for follow-up call in 5-7 days based on severity of symptoms and risk factors. Plan for next call: symptom management-covid pneumonia  CTN provided contact information for future needs. Care Transitions Subsequent and Final Call    Subsequent and Final Calls  Care Transitions Interventions  Other Interventions:            Follow Up  Future Appointments   Date Time Provider Sandra Matthew   5/21/2021 11:00 AM Kenroy Sanchez MD [facility]   5/28/2021  9:30 AM Karen Stewart  [address]   5/28/2021 11:45 AM Farrell Ganser, MD [address], [address] [facility] Care Transitions Nurse   [phone]

## 2021-05-03 NOTE — DISCHARGE SUMMARY
Discharge Summary    Date: 5/3/2021  Patient Name: Lisa Lemus YOB: 1970 Age: 48 y.o. Admit Date: 4/12/2021  Discharge Date: 4/20/2021  Discharge Condition:    Admission Diagnosis  Respiratory failure ([facility] (J96.90)     Discharge Diagnosis  Active Problems: Respiratory failure ([facility] Pneumonia due to COVID-19 virusResolved Problems: * No resolved hospital problems. [facility] Stay  Narrative of Hospital Course:  Patient was admitted for Covid pneumonia and respiratory failure received oxygen therapy and Decadron and remdesivir. Patient doing a lot better will go home and follow-up with PCP as outpatient. Consultants:  IP CONSULT TO PULMONOLOGYIP CONSULT TO INFECTIOUS DISEASESIP CONSULT TO HOME CARE NEEDS    Surgeries/procedures Performed:       Treatments:   IV Hydration and Steroids        Discharge Plan/Disposition:  Home    Hospital/Incidental Findings Requiring Follow Up:    Patient Instructions:    Diet: Regular Diet    Activity:  For number of days (if applicable): Other Instructions:    Provider Follow-Up:   No follow-ups on file. Significant Diagnostic Studies:    Recent Labs:  Admission on 04/12/2021, Discharged on 04/20/2021No results displayed because visit has over 200 results. ------------    Radiology last 7 days:  No results found.      [unfilled]    Discharge Medications    Discharge Medication List as of 4/20/2021 12:47 PMSTART taking these medicationsapixaban (ELIQUIS) 5 MG TABS tabletTake 1 tablet by mouth 2 times daily, Disp-60 tablet, R-1Normaldexamethasone (DECADRON) 6 MG tabletTake 1 tablet by mouth daily (with breakfast) for 3 days, Disp-3 tablet, R-0Normal    Discharge Medication List as of 4/20/2021 12:47 PM    Discharge Medication List as of 4/20/2021 12:47 PMCONTINUE these medications which have NOT CHANGEDmetFORMIN (GLUCOPHAGE) 500 MG tabletTAKE 1 TABLET TWICE NATALIE  WITH MEALS, Disp-180 tablet, R-2NormalHUMALOG 100 UNIT/ML injection vialINJECT PER INSULIN PUMP    MAXIMUM DOSE 100 UNITS PER DAY SUBCUTANEOUSLY, Disp-90 mL, R-2, DAWNormalALPRAZolam (XANAX) 0.5 MG tabletTAKE 1 TABLET BY MOUTH THREE TIMES DAILY AS NEEDED for sleep or anxiety for up to 30 days, Disp-90 tablet,R-2Printlevothyroxine (SYNTHROID) 125 MCG tablet2 po daily, Disp-180 tablet,R-03NormaltraZODone (DESYREL) 50 MG tabletTAKE 3 TABLETS AT BEDTIME, Disp-270 tablet,R-3Normallisinopril (PRINIVIL;ZESTRIL) 10 MG tabletTAKE 1 TABLET DAILY, Disp-90 tablet,R-3Normalpantoprazole (PROTONIX) 40 MG tabletTAKE 1 TABLET DAILY, Disp-90 tablet,R-3NormalQUEtiapine (SEROQUEL) 50 MG tabletTAKE 1 TABLET EVERY MORNINGAND TAKE 2 TABLETS EVERY   EVENING, Disp-270 tablet,R-3Normalalbuterol sulfate  (90 Base) MCG/ACT inhalerINHALE 2 PUFFS EVERY 4 TO 6 HOURS AS NEEDED, R-0Historical MedtraMADol (ULTRAM ER) 100 MG TB24 extended release tabletTAKE 1 TABLET BY MOUTH EVERY night AS NEEDED FOR PAIN FOR 28 DAYS., R-0Historical Medmometasone (ELOCON) 0.1 % creamApply topically daily. , Disp-45 g, R-2, NormalCalcium Carb-Cholecalciferol (CALCIUM 600 + D) 600-200 MG-UNIT TABSone a dayHistorical MedLYRICA 150 MG capsulePlace 150 mg into the right eye 2 times daily. , R-0, DAWHistorical Medinsulin glargine (LANTUS) 100 UNIT/ML injection vial40 units at bedtime, Disp-40 mL, R-3Insulin Syringe-Needle U-100 ([employer] INSULIN SYRINGE) 31G X 5/16\" 0.5 ML MISCDAILY Starting 5/17/2016, Until Discontinued, Disp-300 each, R-3, NormalINSULIN INFUSION PUMPby Does not apply route. Discharge Medication List as of 4/20/2021 12:47 PMSTOP taking these medicationstraMADol (ULTRAM) 50 MG tabletComments:Reason for Stopping:    Time Spent on Discharge:E] minutes were spent in patient examination, evaluation, counseling as well as medication reconciliation, prescriptions for required medications, discharge plan, and follow up.     Electronically signed by Earline Griggs MD on 5/3/21 at 12:42 PM EDT   overtime on dc summary was 45 min

## 2021-05-04 ENCOUNTER — CARE COORDINATION (OUTPATIENT)
Dept: CASE MANAGEMENT | Age: 51
End: 2021-05-04

## 2021-05-04 NOTE — CARE COORDINATION
[facility] Transitions Follow Up Call    2021    Patient: Garry White  Patient : 1970   MRN: <[id number]>  Reason for Admission: 21  Discharge Date: 21 RARS: Readmission Risk Score: 18         Spoke with: Farhan Koo states she is feeling better. Pt. States she continues to use oxygen PRN \"but I am weaning my self off of it\" pulse ox is 93% today without oxygen. Dyspnea with exertion. Appetite is good. Taking all medications as prescribed. Blood glucose today is 92. No new issues or concerns. Pt. Does not have [facility]. Needs at this time. Next appt. Is 21 . Pt. Verbalized understanding. Final call. Needs to be reviewed by the provider   Additional needs identified to be addressed with provider No  none           Method of communication with provider : none      Care Transition Nurse (CTN) contacted the patient by telephone to follow up after admission on 21 Verified name and  with patient as identifiers. Addressed changes since last contact: symptom management-covid 23  Discharged needs reviewed: none  Follow up appointment completed? Yes    Advance Care Planning:   Does patient have an Advance Directive:  reviewed and current. CTN reviewed discharge instructions, medical action plan and red flags with patient and discussed any barriers to care and/or understanding of plan of care after discharge. Discussed appropriate site of care based on symptoms and resources available to patient including: PCP, Specialist, When to call 911 and [address]. The patient agrees to contact the PCP office for questions related to their healthcare. Patients top risk factors for readmission: medical condition-covid 19  Interventions to address risk factors: Obtained and reviewed discharge summary and/or continuity of care documents    Discussed follow-up appointments.  If no appointment was previously scheduled, appointment scheduling offered: no  Is follow up appointment scheduled within 7 days of discharge? Yes  Non-[facility] follow up appointment(s): n/a      n/a  final call based on severity of symptoms and risk factors. Plan for next call: n/a  CTN provided contact information for future needs. Care Transitions Subsequent and Final Call    Subsequent and Final Calls  Do you have any ongoing symptoms?: Yes  Onset of Patient-reported symptoms: In the past 7 days  Patient-reported symptoms: Shortness of Breath  Have your medications changed?: No  Do you have any questions related to your medications?: No  Do you currently have any active services?: No  Do you have any needs or concerns that I can assist you with?: No  Identified Barriers: None  Care Transitions Interventions  No Identified Needs  Other Interventions:            Follow Up  Future Appointments   Date Time Provider Sandra Matthew   5/21/2021 11:00 AM Fransico Bullard MD  [address]   5/28/2021 11:45 AM Raymond Colon MD [facility]   6/18/2021  9:30 AM Sen Wynn MD [address] [address]     Jameson Durán LPEVELYN Care Transitions Nurse   [phone]

## 2021-05-11 ENCOUNTER — TELEPHONE (OUTPATIENT)
Dept: PULMONOLOGY | Age: 51
End: 2021-05-11

## 2021-05-11 DIAGNOSIS — R05.9 COUGH: Primary | ICD-10-CM

## 2021-05-11 RX ORDER — BENZONATATE 200 MG/1
200 CAPSULE ORAL 3 TIMES DAILY PRN
Qty: 30 CAPSULE | Refills: 0 | Status: SHIPPED | OUTPATIENT
Start: 2021-05-11 | End: 2021-05-21

## 2021-05-11 NOTE — TELEPHONE ENCOUNTER
Pt called stating she is still experiencing a cough like she had in the hospital. She said you mentioned you would give her something if she continued coughing. She would like you to call her in something for it. She uses [facility] in [facility]. Phone number [phone].       - in hospital April FU - 5/21/2021

## 2021-05-21 ENCOUNTER — OFFICE VISIT (OUTPATIENT)
Dept: PULMONOLOGY | Age: 51
End: 2021-05-21
Payer: COMMERCIAL

## 2021-05-21 VITALS
HEIGHT: 66 IN | WEIGHT: 293 LBS | SYSTOLIC BLOOD PRESSURE: 131 MMHG | HEART RATE: 113 BPM | OXYGEN SATURATION: 96 % | TEMPERATURE: 98.5 F | DIASTOLIC BLOOD PRESSURE: 78 MMHG | BODY MASS INDEX: 47.09 KG/M2

## 2021-05-21 DIAGNOSIS — U07.1 COVID-19 VIRUS INFECTION: Primary | ICD-10-CM

## 2021-05-21 DIAGNOSIS — E66.01 MORBID OBESITY (HCC): ICD-10-CM

## 2021-05-21 DIAGNOSIS — J96.01 ACUTE RESPIRATORY FAILURE WITH HYPOXIA (HCC): ICD-10-CM

## 2021-05-21 PROBLEM — R09.02 HYPOXIA: Status: ACTIVE | Noted: 2021-05-21

## 2021-05-21 PROCEDURE — 99214 OFFICE O/P EST MOD 30 MIN: CPT | Performed by: INTERNAL MEDICINE

## 2021-05-21 RX ORDER — ALBUTEROL SULFATE 90 UG/1
2 AEROSOL, METERED RESPIRATORY (INHALATION) 4 TIMES DAILY PRN
Qty: 1 INHALER | Refills: 0 | Status: SHIPPED | OUTPATIENT
Start: 2021-05-21

## 2021-05-21 ASSESSMENT — ENCOUNTER SYMPTOMS
CHEST TIGHTNESS: 0
EYE DISCHARGE: 0
SORE THROAT: 0
SHORTNESS OF BREATH: 0
VOMITING: 0
EYE ITCHING: 0
VOICE CHANGE: 0
NAUSEA: 0
WHEEZING: 0
RHINORRHEA: 0
DIARRHEA: 0
COUGH: 0
TROUBLE SWALLOWING: 0
SINUS PRESSURE: 0
ABDOMINAL PAIN: 0

## 2021-05-21 NOTE — PROGRESS NOTES
Subjective:     Vibha Sarabia is a 48 y.o. female who complains today of:     Chief Complaint   Patient presents with    Follow-Up from Hospital     pneumonia/covid       HPI  She was in hospital for Covid  19 pneumonia  On 4/12/21  She is doing better since discharge home. She is off O2  Since last 1 week. No C/o shortness of breath  with exertion. No Wheezing   No Cough with  Sputum. No Hemoptysis  No Chest tightness. No Chest pain with radiation  or pleuritic pain. No  leg edema   No orthopnea  No Fever or chills. No Rhinorrhea and postnasal drip. Allergies:  Patient has no known allergies.   Past Medical History:   Diagnosis Date    Arthritis     right knee and ankle    Pilonidal cyst     Thyroid disease     hypothyroid    Type 1 diabetes (HCC)      Past Surgical History:   Procedure Laterality Date    APPENDECTOMY      ARTHROSCOPY / ARTHROTOMY KNEE Right 11/8/2016    RIGHT KNEE ARTHROSCOPY / MEDIAL MENISCUS TEAR / SUPINE  performed by Kate Hannon MD at [address] [place] [address]     Family History   Problem Relation Age of Onset    Arthritis Mother     Heart Disease Father     High Blood Pressure Father     No Known Problems Daughter      Social History     Socioeconomic History    Marital status:      Spouse name: Not on file    Number of children: Not on file    Years of education: Not on file    Highest education level: Not on file   Occupational History    Not on file   Tobacco Use    Smoking status: Never Smoker    Smokeless tobacco: Never Used   Vaping Use    Vaping Use: Never used   Substance and Sexual Activity    Alcohol use: No    Drug use: No    Sexual activity: Yes   Other Topics Concern    Not on file   Social History Narrative    Not on file     Social Determinants of Health     Financial Resource Strain: Low Risk     Difficulty of Paying Living Expenses: Not hard at all   Food Insecurity: No Food Insecurity    Worried About Running Out of Food in the Last Year: Never true    Ran Out of Food in the Last Year: Never true   Transportation Needs: No Transportation Needs    Lack of Transportation (Medical): No    Lack of Transportation (Non-Medical): No   Physical Activity:     Days of Exercise per Week:     Minutes of Exercise per Session:    Stress:     Feeling of Stress :    Social Connections:     Frequency of Communication with Friends and Family:     Frequency of Social Gatherings with Friends and Family:     Attends Jehov[employer]'s witness Services:     Active Member of Clubs or Organizations:     Attends Club or Organization Meetings:     Marital Status:    Intimate Partner Violence:     Fear of Current or Ex-Partner:     Emotionally Abused:     Physically Abused:     Sexually Abused:          Review of Systems   Constitutional: Negative for chills, diaphoresis, fatigue and fever. HENT: Negative for congestion, mouth sores, nosebleeds, postnasal drip, rhinorrhea, sinus pressure, sneezing, sore throat, trouble swallowing and voice change. Eyes: Negative for discharge, itching and visual disturbance. Respiratory: Negative for cough, chest tightness, shortness of breath and wheezing. Cardiovascular: Negative for chest pain, palpitations and leg swelling. Gastrointestinal: Negative for abdominal pain, diarrhea, nausea and vomiting. Genitourinary: Negative for difficulty urinating and hematuria. Musculoskeletal: Negative for arthralgias, joint swelling and myalgias. Skin: Negative for rash. Allergic/Immunologic: Negative for environmental allergies and food allergies. Neurological: Negative for dizziness, tremors, weakness and headaches.    Psychiatric/Behavioral: Negative for behavioral problems and sleep disturbance.         :     Vitals:    05/21/21 1100   BP: 131/78   Pulse: 113   Temp: 98.5 °F (36.9 °C)   SpO2: 96%   Weight: (!) 343 lb (155.6 kg)   Height: 5' 6\" (1.676 m)     Wt Readings from Last 3 Encounters: 05/21/21 (!) 343 lb (155.6 kg)   04/16/21 (!) 329 lb 12.9 oz (149.6 kg)   01/13/21 (!) 357 lb (161.9 kg)         Physical Exam  Constitutional:       General: She is not in acute distress. Appearance: She is well-developed. She is not diaphoretic. HENT:      Head: Normocephalic and atraumatic. Nose: Nose normal.   Eyes:      Pupils: Pupils are equal, round, and reactive to light. Neck:      Thyroid: No thyromegaly. Vascular: No JVD. Trachea: No tracheal deviation. Cardiovascular:      Rate and Rhythm: Normal rate and regular rhythm. Heart sounds: No murmur heard. No friction rub. No gallop. Pulmonary:      Effort: No respiratory distress. Breath sounds: No wheezing or rales. Chest:      Chest wall: No tenderness. Abdominal:      General: There is no distension. Tenderness: There is no abdominal tenderness. There is no rebound. Musculoskeletal:         General: Normal range of motion. Lymphadenopathy:      Cervical: No cervical adenopathy. Skin:     General: Skin is warm and dry. Neurological:      Mental Status: She is alert and oriented to person, place, and time.       Coordination: Coordination normal.         Current Outpatient Medications   Medication Sig Dispense Refill    albuterol sulfate HFA (VENTOLIN HFA) 108 (90 Base) MCG/ACT inhaler Inhale 2 puffs into the lungs 4 times daily as needed for Wheezing 1 Inhaler 0    benzonatate (TESSALON) 200 MG capsule Take 1 capsule by mouth 3 times daily as needed for Cough 30 capsule 0    traMADol (ULTRAM ER) 100 MG extended release tablet Take 1 tablet by mouth every night as needed for pain      metFORMIN (GLUCOPHAGE) 500 MG tablet TAKE 1 TABLET TWICE DAILY  WITH MEALS 180 tablet 2    HUMALOG 100 UNIT/ML injection vial INJECT PER INSULIN PUMP    MAXIMUM DOSE 100 UNITS PER DAY SUBCUTANEOUSLY 90 mL 2    ALPRAZolam (XANAX) 0.5 MG tablet TAKE 1 TABLET BY MOUTH THREE TIMES DAILY AS NEEDED for sleep or anxiety for up to 30 days 90 tablet 2    levothyroxine (SYNTHROID) 125 MCG tablet 2 po daily 180 tablet 03    traZODone (DESYREL) 50 MG tablet TAKE 3 TABLETS AT BEDTIME 270 tablet 3    lisinopril (PRINIVIL;ZESTRIL) 10 MG tablet TAKE 1 TABLET DAILY 90 tablet 3    pantoprazole (PROTONIX) 40 MG tablet TAKE 1 TABLET DAILY 90 tablet 3    QUEtiapine (SEROQUEL) 50 MG tablet TAKE 1 TABLET EVERY MORNINGAND TAKE 2 TABLETS EVERY   EVENING 270 tablet 3    albuterol sulfate  (90 Base) MCG/ACT inhaler INHALE 2 PUFFS EVERY 4 TO 6 HOURS AS NEEDED  0    mometasone (ELOCON) 0.1 % cream Apply topically daily. 45 g 2    Calcium Carb-Cholecalciferol (CALCIUM 600 + D) 600-200 MG-UNIT TABS one a day      LYRICA 150 MG capsule Place 150 mg into the right eye 2 times daily. 0    insulin glargine (LANTUS) 100 UNIT/ML injection vial 40 units at bedtime (Patient taking differently: Indications: Pt to use as backup if insulin pump fails 40 units at bedtime) 40 mL 3    Insulin Syringe-Needle U-100 ([employer] INSULIN SYRINGE) 31G X 5/16\" 0.5 ML MISC 1 each by Does not apply route daily 300 each 3    INSULIN INFUSION PUMP by Does not apply route. No current facility-administered medications for this visit. Results for orders placed during the hospital encounter of 03/04/17    XR Chest Standard TWO VW    Narrative  TWO-VIEW CHEST: 3/4/2017    CLINICAL HISTORY: Shortness of breath. COMPARISON: None available. Upright PA and lateral radiographs of the chest were obtained. FINDINGS:    The lungs are clear. The heart is mildly enlarged. There is no significant pleural effusion, vascular congestion, pneumothorax, or fractures identified. Impression  MILD COMPENSATED CARDIOMEGALY.     NO EVIDENCE OF ACTIVE CARDIOPULMONARY DISEASE.  ]  Results for orders placed during the hospital encounter of 04/12/21    XR CHEST PORTABLE    Narrative  EXAMINATION: XR CHEST PORTABLE    CLINICAL HISTORY: SHORTNESS OF BREATH    COMPARISONS: MARCH 4, 2017    FINDINGS: Image performed in partial expiration. Cardiopericardial silhouette enlarged. Patchy area of increased opacity left upper, mid and lower lung. Pulmonary vasculature indistinct. Impression  CARDIOMEGALY. LEFT LUNG ATELECTASIS/PNEUMONIA, PULMONARY VENOUS CONGESTION, IN THIS PARTIALLY EXPIRATORY CHEST RADIOGRAPH    Assessment/Plan:     1. COVID-19 virus infection  She was in hospital for Covid  19 pneumonia  On 4/12/21. She is doing better since discharge home. She is off O2  Since last 1 week. No C/o shortness of breath  with exertion. No Wheezing . No Cough with  Sputum. She is doing much better since she had COVID-19 pneumonia. Last chest x-ray shows cardiomegaly left lung atelectasis/pneumonia. Pulmonary venous congestion in the partially expiratory chest radiograph. Follow-up chest x-ray before next visit    2. Acute respiratory failure with hypoxia ([address]  She was admitted with acute respiratory failure with hypoxia. Hypoxia significantly improved. She was sent home with oxygen but she is not using for last 1 week. Oxygen will be discontinued if patient does not use it daily in few weeks. Return in about 3 months (around 8/21/2021) for CXR result.       Aston Ng MD

## 2021-05-28 ENCOUNTER — OFFICE VISIT (OUTPATIENT)
Dept: FAMILY MEDICINE CLINIC | Age: 51
End: 2021-05-28
Payer: COMMERCIAL

## 2021-05-28 VITALS
BODY MASS INDEX: 47.09 KG/M2 | SYSTOLIC BLOOD PRESSURE: 128 MMHG | WEIGHT: 293 LBS | HEART RATE: 121 BPM | DIASTOLIC BLOOD PRESSURE: 74 MMHG | HEIGHT: 66 IN | OXYGEN SATURATION: 95 % | TEMPERATURE: 98.5 F

## 2021-05-28 DIAGNOSIS — J96.01 ACUTE RESPIRATORY FAILURE WITH HYPOXIA (HCC): ICD-10-CM

## 2021-05-28 DIAGNOSIS — R30.0 DYSURIA: ICD-10-CM

## 2021-05-28 DIAGNOSIS — Z79.899 LONG-TERM USE OF HIGH-RISK MEDICATION: ICD-10-CM

## 2021-05-28 DIAGNOSIS — J12.82 PNEUMONIA DUE TO COVID-19 VIRUS: Primary | ICD-10-CM

## 2021-05-28 DIAGNOSIS — U07.1 PNEUMONIA DUE TO COVID-19 VIRUS: Primary | ICD-10-CM

## 2021-05-28 DIAGNOSIS — F41.0 PANIC ATTACKS: ICD-10-CM

## 2021-05-28 LAB
BILIRUBIN, POC: NORMAL
BLOOD URINE, POC: NORMAL
CLARITY, POC: NORMAL
COLOR, POC: YELLOW
GLUCOSE URINE, POC: NORMAL
KETONES, POC: NORMAL
LEUKOCYTE EST, POC: NORMAL
NITRITE, POC: NORMAL
PH, POC: 6
PROTEIN, POC: NORMAL
SPECIFIC GRAVITY, POC: 1.01
UROBILINOGEN, POC: NORMAL

## 2021-05-28 PROCEDURE — 99213 OFFICE O/P EST LOW 20 MIN: CPT | Performed by: FAMILY MEDICINE

## 2021-05-28 PROCEDURE — 81003 URINALYSIS AUTO W/O SCOPE: CPT | Performed by: FAMILY MEDICINE

## 2021-05-28 RX ORDER — LEVOFLOXACIN 500 MG/1
500 TABLET, FILM COATED ORAL DAILY
Qty: 10 TABLET | Refills: 0 | Status: SHIPPED | OUTPATIENT
Start: 2021-05-28 | End: 2021-06-07

## 2021-05-28 RX ORDER — ALPRAZOLAM 0.5 MG/1
TABLET ORAL
Qty: 90 TABLET | Refills: 2 | Status: SHIPPED | OUTPATIENT
Start: 2021-05-28 | End: 2022-06-23 | Stop reason: SDUPTHER

## 2021-05-28 ASSESSMENT — ENCOUNTER SYMPTOMS
COUGH: 0
EYES NEGATIVE: 1
GASTROINTESTINAL NEGATIVE: 1
CHEST TIGHTNESS: 0
RESPIRATORY NEGATIVE: 1
RHINORRHEA: 0

## 2021-05-28 NOTE — LETTER
CONTROLLED SUBSTANCE MEDICATION AGREEMENT     Patient Name: Candy Orr  Patient YOB: 1970   I understand, that controlled substance medications may be used to help better manage my symptoms and to improve my ability to function at home, work and in social settings. However, I also understand that these medications do have risks, which have been discussed with me, including possible development of physical or psychological dependence. I understand that successful treatment requires mutual trust and honesty between me and my provider. I understand and agree that following this Medication Agreement is necessary in continuing my provider-patient relationship and the success of my treatment plan. Explanation from my Provider: Benefits and Goals of Controlled Substance Medications: There are two potential goals for your treatment: (1) decreased pain and suffering (2) improved daily life functions. There are many possible treatments for your chronic condition(s). Alternatives such as physical therapy, yoga, massage, home daily exercise, meditation, relaxation techniques, injections, chiropractic manipulations, surgery, cognitive therapy, hypnosis and many medications that are not habit-forming may be used. Use of controlled substance medications may be helpful, but they are unlikely to resolve all symptoms or restore all function. Explanation from my Provider: Risks of Controlled Substance Medications:  Opioid pain medications: These medications can lead to problems such as addiction/dependence, sedation, lightheadedness/dizziness, memory issues, falls, constipation, nausea, or vomiting. They may also impair the ability to drive or operate machinery. Additionally, these medications may lower testosterone levels, leading to loss of bone strength, stamina and sex drive.   They may cause problems with breathing, sleep apnea and reduced coughing, which is especially dangerous for patients with lung disease. Overdose or dangerous interactions with alcohol and other medications may occur, leading to death. Hyperalgesia may develop, which means patients receiving opioids for the treatment of pain may become more sensitive to certain painful stimuli, and in some cases, experience pain from ordinarily non-painful stimuli. Women between the ages of 14-53 who could become pregnant should carefully weigh the risks and benefits of opioids with their physicians, as these medications increase the risk of pregnancy complications, including miscarriage,  delivery and stillbirth. It is also possible for babies to be born addicted to opioids. Opioid dependence withdrawal symptoms may include; feelings of uneasiness, increased pain, irritability, belly pain, diarrhea, sweats and goose-flesh. Benzodiazepines and non-benzodiazepine sleep medications: These medications can lead to problems such as addiction/dependence, sedation, fatigue, lightheadedness, dizziness, incoordination, falls, depression, hallucinations, and impaired judgment, memory and concentration. The ability to drive and operate machinery may also be affected. Abnormal sleep-related behaviors have been reported, including sleepwalking, driving, making telephone calls, eating, or having sex while not fully awake. These medications can suppress breathing and worsen sleep apnea, particularly when combined with alcohol or other sedating medications, potentially leading to death. Dependence withdrawal symptoms may include tremors, anxiety, hallucinations and seizures. Stimulants:  Common adverse effects include addiction/dependence, increased blood  pressure and heart rate, decreased appetite, nausea, involuntary weight loss, insomnia,                                                                                                                     Initials:_______   irritability, and headaches.   These risks may increase when these medications are combined with other stimulants, such as caffeine pills or energy drinks, certain weight loss supplements and oral decongestants. Dependence withdrawal symptoms may include depressed mood, loss of interest, suicidal thoughts, anxiety, fatigue, appetite changes and agitation. Testosterone replacement therapy:  Potential side effects include increased risk of stroke and heart attack, blood clots, increased blood pressure, increased cholesterol, enlarged prostate, sleep apnea, irritability/aggression and other mood disorders, and decreased fertility. I agree and understand that I and my prescriber have the following rights and responsibilities regarding my treatment plan:     1. MY RIGHTS:  To be informed of my treatment and medication plan. To be an active participant in my health and wellbeing. 2. MY RESPONSIBILITY AND UNDERSTANDING FOR USE OF MEDICATIONS   I will take medications at the dose and frequency as directed. For my safety, I will not increase or change how I take my medications without the recommendation of my healthcare provider.  I will actively participate in any program recommended by my provider which may improve function, including social, physical, psychological programs.  I will not take my medications with alcohol or other drugs not prescribed to me. I understand that drinking alcohol with my medications increases the chances of side effects, including reduced breathing rate and could lead to personal injury when operating machinery.  I understand that if I have a history of substance use disorders, including alcohol or other illicit drugs, that I may be at increased risk of addiction to my medications.  I agree to notify my provider immediately if I should become pregnant so that my treatment plan can be adjusted.    I agree and understand that I shall only receive controlled substance medications from the prescriber that signed this agreement unless there is written agreement among other prescribers of controlled substances outlining the responsibility of the medications being prescribed.  I understand that the if the controlled medication is not helping to achieve goals, the dosage may be tapered and no longer prescribed. 3. MY RESPONSIBILITY FOR COMMUNICATION / PRESCRIPTION RENEWALS   I agree that all controlled substance medications that I take will be prescribed only by my provider. If another healthcare provider prescribes me medication in an emergency, I will notify my provider within seventy-two (72) hours.  I will arrange for refills at the prescribed interval ONLY during regular office hours. I will not ask for refills earlier than agreed, after-hours, on holidays or weekends. Refills may take up to 72 hours for processing and prescriptions to reach the pharmacy.  I will inform my other health care providers that I am taking these medications and of the existence of this [id number]. In the event of an emergency, I will provide the same information to the emergency department prescribers.  I will keep my provider updated on the pharmacy I am using for controlled medication prescription filling. Initials:_______  4. MY RESPONSIBILITY FOR PROTECTING MEDICATIONS   I will protect my prescriptions and medications. I understand that lost or misplaced prescriptions will not be replaced.  I will keep medications only for my own use and will not share them with others. I will keep all medications away from children.  I agree that if my medications are adjusted or discontinued, I will properly dispose of any remaining medications. I understand that I will be required to dispose of any remaining controlled medications as, directed by my prescriber, prior to being provided with any prescriptions for other controlled medications.   Medication drop box locations can be found at: [email or web address]    5. MY RESPONSIBILITY WITH ILLEGAL DRUGS    I will not use illegal or street drugs or another person's prescription medications not prescribed to me.  If there are identified addiction type symptoms, then referral to a program may be provided by my provider and I agree to follow through with this recommendation. 6. MY RESPONSIBILITY FOR COOPERATION WITH INVESTIGATIONS   I understand that my provider will comply with any applicable law and may discuss my use and/or possible misuse/abuse of controlled substances and alcohol, as appropriate, with any health care provider involved in my care, pharmacist, or legal authority.  I authorize my provider and pharmacy to cooperate fully with law enforcement agencies (as permitted by law) in the investigation of any possible misuse, sale, or other diversion of my controlled substances.  I agree to waive any applicable privilege or right of privacy or confidentiality with respect to these authorizations. 7. PROVIDERS RIGHT TO MONITOR FOR SAFETY: PRESCRIPTION MONITORING / DRUG TESTING   I consent to drug/toxicology screening and will submit to a drug screen upon my providers request to assure I am only taking the prescribed drugs for my safety monitoring. I understand that a drug screen is a laboratory test in which a sample of my urine, blood or saliva is checked to see what drugs I have been taking. This may entail an observed urine specimen, which means that a nurse or other health care provider may watch me provide urine, and I will cooperate if I am asked to provide an observed specimen.  I understand that my provider will check a copy of my State Prescription Monitoring Program () Report in order to safely prescribe medications.  Pill Counts: I consent to pill counts when requested.   I may be asked to bring all my prescribed controlled substance medications, in their original bottles, to all of my scheduled appointments. In addition, my provider may ask me to come to the practice at any time for a random pill count. 8. TERMINATION OF THIS AGREEMENT  For my safety, my prescriber has the right to stop prescribing controlled substance medications and may end this agreement. Initials:_______   Conditions that may result in termination of this agreement:  a. I do not show any improvement in pain, or my activity has not improved. b. I develop rapid tolerance or loss of improvement, as described in my treatment plan.  c. I develop significant side effects from the medication. d. My behavior is not consistent with the responsibilities outlined above, thereby causing safety concerns to continue prescribing controlled substance medications. e. I fail to follow the terms of this agreement. f. Other:____________________________       UNDERSTANDING THIS MEDICATION AGREEMENT:    I have read the above and have had all my questions answered. For chronic disease management, I know that my symptoms can be managed with many types of treatments. A chronic medication trial may be part of my treatment, but I must be an active participant in my care. Medication therapy is only one part of my symptom management plan. In some cases, there may be limited scientific evidence to support the chronic use of certain medications to improve symptoms and daily function. Furthermore, in certain circumstances, there may be scientific information that suggests that the use of chronic controlled substances may worsen my symptoms and increase my risk of unintentional death directly related to this medication therapy. I know that if my provider feels my risk from controlled medications is greater than my benefit, I will have my controlled substance medication(s) compassionately lowered or removed altogether.      I further agree to allow this office to contact my HIPAA contact if there are concerns about my safety and use of the controlled medications. I have agreed to use the prescribed controlled substance medications to me as instructed by my provider and as stated in this Medication Agreement. My initial on each page and my signature below shows that I have read each page and I have had the opportunity to ask questions with answers provided by my provider.     Patient Name (Printed): _____________________________________  Patient Signature:  ______________________   Date: _____________    Prescriber Name (Printed): ___________________________________  Prescriber Signature: _____________________  Date: _____________

## 2021-05-28 NOTE — PROGRESS NOTES
Patient is seen in follow up for   Chief Complaint   Patient presents with    Pneumonia     1 mo followup for pneumonia. States that she's off of the oxygen. Trying to build her function back up.  Dysuria     Urinary frequency, hesitancy, pain. Trying AZO, cranberry juice OTC with no relief. HPIhere for follow up on pneumonia seeing pulmonary.     Past Medical History:   Diagnosis Date    Arthritis     right knee and ankle    Pilonidal cyst     Thyroid disease     hypothyroid    Type 1 diabetes [facility])      Patient Active Problem List    Diagnosis Date Noted    Hypoxia 05/21/2021    COVID-19 virus infection     Respiratory failure ([address] 04/12/2021    Brow ptosis 01/22/2016    Long-term insulin use ([address] 01/22/2016    Nuclear sclerotic cataract of both eyes 01/22/2016    Background retinopathy due to secondary diabetes ([address] 10/16/2014    Age related cataract 10/16/2014    Type 1 diabetes mellitus ([address] 07/02/2014    Obesity 08/18/2013    Panic attacks 11/29/2012    Pilonidal cyst     Vitamin D deficiency 03/28/2011    Chronic lymphocytic thyroiditis 11/05/2002    Acquired hypothyroidism 11/05/2002     Past Surgical History:   Procedure Laterality Date    APPENDECTOMY      ARTHROSCOPY / ARTHROTOMY KNEE Right 11/8/2016    RIGHT KNEE ARTHROSCOPY / MEDIAL MENISCUS TEAR / SUPINE  performed by Adriana Velázquez MD at [address] [place] [address]     Family History   Problem Relation Age of Onset    Arthritis Mother     Heart Disease Father     High Blood Pressure Father     No Known Problems Daughter      Social History     Socioeconomic History    Marital status:      Spouse name: None    Number of children: None    Years of education: None    Highest education level: None   Occupational History    None   Tobacco Use    Smoking status: Never Smoker    Smokeless tobacco: Never Used   Vaping Use    Vaping Use: Never used   Substance and Sexual Activity    Alcohol use: No    Drug use: No    Sexual activity: Yes   Other Topics Concern    None   Social History Narrative    None     Social Determinants of Health     Financial Resource Strain: Low Risk     Difficulty of Paying Living Expenses: Not hard at all   Food Insecurity: No Food Insecurity    Worried About Running Out of Food in the Last Year: Never true    Jason of Food in the Last Year: Never true   Transportation Needs: No Transportation Needs    Lack of Transportation (Medical): No    Lack of Transportation (Non-Medical):  No   Physical Activity:     Days of Exercise per Week:     Minutes of Exercise per Session:    Stress:     Feeling of Stress :    Social Connections:     Frequency of Communication with Friends and Family:     Frequency of Social Gatherings with Friends and Family:     Attends [employer] Services:     Active Member of Clubs or Organizations:     Attends Club or Organization Meetings:     Marital Status:    Intimate Partner Violence:     Fear of Current or Ex-Partner:     Emotionally Abused:     Physically Abused:     Sexually Abused:      Current Outpatient Medications   Medication Sig Dispense Refill    apixaban (ELIQUIS) 5 MG TABS tablet Take 1 tablet by mouth 2 times daily      levoFLOXacin (LEVAQUIN) 500 MG tablet Take 1 tablet by mouth daily for 10 days 10 tablet 0    ALPRAZolam (XANAX) 0.5 MG tablet TAKE 1 TABLET BY MOUTH THREE TIMES DAILY AS NEEDED for sleep or anxiety for up to 30 days 90 tablet 2    albuterol sulfate HFA (VENTOLIN HFA) 108 (90 Base) MCG/ACT inhaler Inhale 2 puffs into the lungs 4 times daily as needed for Wheezing 1 Inhaler 0    traMADol (ULTRAM ER) 100 MG extended release tablet Take 1 tablet by mouth every night as needed for pain      metFORMIN (GLUCOPHAGE) 500 MG tablet TAKE 1 TABLET TWICE DAILY  WITH MEALS 180 tablet 2    HUMALOG 100 UNIT/ML injection vial INJECT PER INSULIN PUMP    MAXIMUM DOSE 100 UNITS PER DAY SUBCUTANEOUSLY 90 mL 2    levothyroxine (SYNTHROID) 125 MCG tablet 2 po daily 180 tablet 03    traZODone (DESYREL) 50 MG tablet TAKE 3 TABLETS AT BEDTIME 270 tablet 3    lisinopril (PRINIVIL;ZESTRIL) 10 MG tablet TAKE 1 TABLET DAILY 90 tablet 3    pantoprazole (PROTONIX) 40 MG tablet TAKE 1 TABLET DAILY 90 tablet 3    QUEtiapine (SEROQUEL) 50 MG tablet TAKE 1 TABLET EVERY MORNINGAND TAKE 2 TABLETS EVERY   EVENING 270 tablet 3    albuterol sulfate  (90 Base) MCG/ACT inhaler INHALE 2 PUFFS EVERY 4 TO 6 HOURS AS NEEDED  0    mometasone (ELOCON) 0.1 % cream Apply topically daily. 45 g 2    Calcium Carb-Cholecalciferol (CALCIUM 600 + D) 600-200 MG-UNIT TABS one a day      LYRICA 150 MG capsule Place 150 mg into the right eye 2 times daily. 0    insulin glargine (LANTUS) 100 UNIT/ML injection vial 40 units at bedtime (Patient taking differently: Indications: Pt to use as backup if insulin pump fails 40 units at bedtime) 40 mL 3    Insulin Syringe-Needle U-100 ([employer] INSULIN SYRINGE) 31G X 5/16\" 0.5 ML MISC 1 each by Does not apply route daily 300 each 3    INSULIN INFUSION PUMP by Does not apply route. No current facility-administered medications for this visit.      Current Outpatient Medications on File Prior to Visit   Medication Sig Dispense Refill    apixaban (ELIQUIS) 5 MG TABS tablet Take 1 tablet by mouth 2 times daily      albuterol sulfate HFA (VENTOLIN HFA) 108 (90 Base) MCG/ACT inhaler Inhale 2 puffs into the lungs 4 times daily as needed for Wheezing 1 Inhaler 0    traMADol (ULTRAM ER) 100 MG extended release tablet Take 1 tablet by mouth every night as needed for pain      metFORMIN (GLUCOPHAGE) 500 MG tablet TAKE 1 TABLET TWICE DAILY  WITH MEALS 180 tablet 2    HUMALOG 100 UNIT/ML injection vial INJECT PER INSULIN PUMP    MAXIMUM DOSE 100 UNITS PER DAY SUBCUTANEOUSLY 90 mL 2    levothyroxine (SYNTHROID) 125 MCG tablet 2 po daily 180 tablet 03    traZODone (DESYREL) 50 MG tablet TAKE 3 TABLETS AT BEDTIME 270 tablet 3    lisinopril (PRINIVIL;ZESTRIL) 10 MG tablet TAKE 1 TABLET DAILY 90 tablet 3    pantoprazole (PROTONIX) 40 MG tablet TAKE 1 TABLET DAILY 90 tablet 3    QUEtiapine (SEROQUEL) 50 MG tablet TAKE 1 TABLET EVERY MORNINGAND TAKE 2 TABLETS EVERY   EVENING 270 tablet 3    albuterol sulfate  (90 Base) MCG/ACT inhaler INHALE 2 PUFFS EVERY 4 TO 6 HOURS AS NEEDED  0    mometasone (ELOCON) 0.1 % cream Apply topically daily. 45 g 2    Calcium Carb-Cholecalciferol (CALCIUM 600 + D) 600-200 MG-UNIT TABS one a day      LYRICA 150 MG capsule Place 150 mg into the right eye 2 times daily. 0    insulin glargine (LANTUS) 100 UNIT/ML injection vial 40 units at bedtime (Patient taking differently: Indications: Pt to use as backup if insulin pump fails 40 units at bedtime) 40 mL 3    Insulin Syringe-Needle U-100 ([employer] INSULIN SYRINGE) 31G X 5/16\" 0.5 ML MISC 1 each by Does not apply route daily 300 each 3    INSULIN INFUSION PUMP by Does not apply route. No current facility-administered medications on file prior to visit.      No Known Allergies  Health Maintenance   Topic Date Due    Pneumococcal 0-64 years Vaccine (1 of 2 - PPSV23) Never done    DTaP/Tdap/Td vaccine (1 - Tdap) Never done    Cervical cancer screen  Never done    Hepatitis B vaccine (3 of 3 - Risk 3-dose series) 06/28/2011    Breast cancer screen  11/01/2020    Shingles Vaccine (1 of 2) Never done    Colon cancer screen colonoscopy  Never done    Diabetic foot exam  03/04/2021    COVID-19 Vaccine (2 - Pfizer 2-dose series) 04/30/2021    Flu vaccine (Season Ended) 11/17/2021 (Originally 9/1/2021)    HIV screen  11/21/2028 (Originally 11/1/1985)    Diabetic microalbuminuria test  08/06/2021    Lipid screen  01/08/2022    Diabetic retinal exam  04/05/2022    TSH testing  04/12/2022    A1C test (Diabetic or Prediabetic)  04/13/2022    Potassium monitoring  04/19/2022    Creatinine monitoring  04/19/2022    Hepatitis C screen  Completed    Hepatitis A vaccine  Aged Out    Hib vaccine  Aged Out    Meningococcal (ACWY) vaccine  Aged Out       Review of Systems     Review of Systems   Constitutional: Negative for activity change, appetite change, fatigue and fever. HENT: Negative for congestion and rhinorrhea. Eyes: Negative. Respiratory: Negative. Negative for cough and chest tightness. Cardiovascular: Negative. Gastrointestinal: Negative. Endocrine: Negative. Genitourinary: Negative. Musculoskeletal: Negative. Skin: Negative. Neurological: Negative for dizziness, light-headedness and numbness. Hematological: Negative. Psychiatric/Behavioral: Negative. Physical Exam  Vitals:    05/28/21 1142   BP: 128/74   Site: Left Upper Arm   Position: Sitting   Cuff Size: Large Adult   Pulse: 121   Temp: 98.5 °F (36.9 °C)   TempSrc: Oral   SpO2: 95%   Weight: (!) 343 lb 8 oz (155.8 kg)   Height: 5' 6\" (1.676 m)       Physical Exam  Constitutional:       Appearance: She is well-developed. HENT:      Right Ear: External ear normal.      Left Ear: External ear normal.   Eyes:      Pupils: Pupils are equal, round, and reactive to light. Neck:      Thyroid: No thyromegaly. Cardiovascular:      Rate and Rhythm: Normal rate and regular rhythm. Heart sounds: Normal heart sounds. No murmur heard. No friction rub. No gallop. Pulmonary:      Effort: Pulmonary effort is normal. No respiratory distress. Breath sounds: No wheezing or rales. Chest:      Chest wall: No tenderness. Abdominal:      General: Bowel sounds are normal. There is no distension. Palpations: Abdomen is soft. There is no mass. Tenderness: There is no abdominal tenderness. There is no guarding or rebound. Musculoskeletal:         General: Normal range of motion. Cervical back: Normal range of motion and neck supple. Lymphadenopathy:      Cervical: No cervical adenopathy.    Skin: General: Skin is warm and dry. Neurological:      Mental Status: She is alert and oriented to person, place, and time. Cranial Nerves: No cranial nerve deficit. Coordination: Coordination normal.         Assessment   Diagnosis Orders   1. Pneumonia due to COVID-19 virus     2. Dysuria  POCT Urinalysis No Micro (Auto)   3. Long-term use of high-risk medication  Pain Management Drug Screen   4. Acute respiratory failure with hypoxia (HCC)     5. Panic attacks  ALPRAZolam (XANAX) 0.5 MG tablet     Problem List     Panic attacks    Relevant Medications    traZODone (DESYREL) 50 MG tablet    ALPRAZolam (XANAX) 0.5 MG tablet    Respiratory failure ([address]          Plan  Orders Placed This Encounter   Procedures    Pain Management Drug Screen     Standing Status:   Future     Standing Expiration Date:   5/28/2022    POCT Urinalysis No Micro (Auto)     Orders Placed This Encounter   Medications    levoFLOXacin (LEVAQUIN) 500 MG tablet     Sig: Take 1 tablet by mouth daily for 10 days     Dispense:  10 tablet     Refill:  0    ALPRAZolam (XANAX) 0.5 MG tablet     Sig: TAKE 1 TABLET BY MOUTH THREE TIMES DAILY AS NEEDED for sleep or anxiety for up to 30 days     Dispense:  90 tablet     Refill:  2     No follow-ups on file.   Maryam Sinclair MD

## 2021-05-30 LAB
6-ACETYLMORPHINE: NOT DETECTED
7-AMINOCLONAZEPAM: NOT DETECTED
ALPHA-OH-ALPRAZOLAM: PRESENT
ALPHA-OH-MIDAZOLAM, URINE: NOT DETECTED
ALPRAZOLAM: NOT DETECTED
AMPHETAMINE: NOT DETECTED
BARBITURATES: NOT DETECTED
BENZOYLECGONINE: NOT DETECTED
BUPRENORPHINE: NOT DETECTED
CARISOPRODOL: NOT DETECTED
CLONAZEPAM: NOT DETECTED
CODEINE: NOT DETECTED
CREATININE URINE: 137.3 MG/DL (ref 20–400)
DIAZEPAM: NOT DETECTED
EER PAIN MGT DRUG PANEL, HIGH RES/EMIT U: NORMAL
ETHYL GLUCURONIDE: NOT DETECTED
FENTANYL: NOT DETECTED
GABAPENTIN: NOT DETECTED
HYDROCODONE: NOT DETECTED
HYDROMORPHONE: NOT DETECTED
LORAZEPAM: NOT DETECTED
MARIJUANA METABOLITE: NOT DETECTED
MDA: NOT DETECTED
MDEA: NOT DETECTED
MDMA URINE: NOT DETECTED
MEPERIDINE: NOT DETECTED
METHADONE: NOT DETECTED
METHAMPHETAMINE: NOT DETECTED
METHYLPHENIDATE: NOT DETECTED
MIDAZOLAM: NOT DETECTED
MORPHINE: NOT DETECTED
NALOXONE: NOT DETECTED
NORBUPRENORPHINE, FREE: NOT DETECTED
NORDIAZEPAM: NOT DETECTED
NORFENTANYL: NOT DETECTED
NORHYDROCODONE, URINE: NOT DETECTED
NOROXYCODONE: NOT DETECTED
NOROXYMORPHONE, URINE: NOT DETECTED
OXAZEPAM: NOT DETECTED
OXYCODONE: NOT DETECTED
OXYMORPHONE: NOT DETECTED
PAIN MANAGEMENT DRUG PANEL: NORMAL
PCP: NOT DETECTED
PHENTERMINE: NOT DETECTED
PREGABALIN: PRESENT
TAPENTADOL, URINE: NOT DETECTED
TAPENTADOL-O-SULFATE, URINE: NOT DETECTED
TEMAZEPAM: NOT DETECTED
TRAMADOL: PRESENT
ZOLPIDEM: NOT DETECTED

## 2021-05-31 LAB
ORGANISM: ABNORMAL
URINE CULTURE, ROUTINE: ABNORMAL

## 2021-06-28 DIAGNOSIS — E10.9 TYPE 1 DIABETES MELLITUS WITHOUT COMPLICATION (HCC): ICD-10-CM

## 2021-06-28 LAB
ANION GAP SERPL CALCULATED.3IONS-SCNC: 14 MEQ/L (ref 9–15)
BUN BLDV-MCNC: 9 MG/DL (ref 6–20)
CALCIUM SERPL-MCNC: 9.6 MG/DL (ref 8.5–9.9)
CHLORIDE BLD-SCNC: 102 MEQ/L (ref 95–107)
CO2: 24 MEQ/L (ref 20–31)
CREAT SERPL-MCNC: 1.11 MG/DL (ref 0.5–0.9)
CREATININE URINE: 98.2 MG/DL
GFR AFRICAN AMERICAN: >60
GFR NON-AFRICAN AMERICAN: 51.9
GLUCOSE BLD-MCNC: 102 MG/DL (ref 70–99)
HBA1C MFR BLD: 7.1 % (ref 4.8–5.9)
MICROALBUMIN UR-MCNC: 2.2 MG/DL
MICROALBUMIN/CREAT UR-RTO: 22.4 MG/G (ref 0–30)
POTASSIUM SERPL-SCNC: 4.7 MEQ/L (ref 3.4–4.9)
SODIUM BLD-SCNC: 140 MEQ/L (ref 135–144)
T4 FREE: 1.38 NG/DL (ref 0.84–1.68)
TSH REFLEX: 0.62 UIU/ML (ref 0.44–3.86)

## 2021-07-02 ENCOUNTER — OFFICE VISIT (OUTPATIENT)
Dept: ENDOCRINOLOGY | Age: 51
End: 2021-07-02
Payer: COMMERCIAL

## 2021-07-02 VITALS
SYSTOLIC BLOOD PRESSURE: 137 MMHG | DIASTOLIC BLOOD PRESSURE: 85 MMHG | HEIGHT: 66 IN | HEART RATE: 100 BPM | WEIGHT: 293 LBS | OXYGEN SATURATION: 94 % | BODY MASS INDEX: 47.09 KG/M2

## 2021-07-02 DIAGNOSIS — E10.9 TYPE 1 DIABETES MELLITUS WITHOUT COMPLICATION (HCC): Primary | ICD-10-CM

## 2021-07-02 DIAGNOSIS — E03.9 ACQUIRED HYPOTHYROIDISM: ICD-10-CM

## 2021-07-02 PROCEDURE — 3051F HG A1C>EQUAL 7.0%<8.0%: CPT | Performed by: INTERNAL MEDICINE

## 2021-07-02 PROCEDURE — 99213 OFFICE O/P EST LOW 20 MIN: CPT | Performed by: INTERNAL MEDICINE

## 2021-07-02 ASSESSMENT — ENCOUNTER SYMPTOMS
COLOR CHANGE: 1
RESPIRATORY NEGATIVE: 1

## 2021-07-02 NOTE — PROGRESS NOTES
7/2/2021    Assessment:       Diagnosis Orders   1. Type 1 diabetes mellitus without complication ([address]     2. Acquired hypothyroidism           PLAN:     Orders Placed This Encounter   Procedures    Basic Metabolic Panel     Standing Status:   Future     Standing Expiration Date:   7/2/2022    Hemoglobin A1C     Standing Status:   Future     Standing Expiration Date:   7/2/2022    TSH without Reflex     Standing Status:   Future     Standing Expiration Date:   7/2/2022    T4, Free     Standing Status:   Future     Standing Expiration Date:   7/2/2022     DIABETES FOOT EXAM     Continue current dose of insulin through pump as per current setting continue levothyroxine at current dose follow-up in 3 months time patient to monitor the area in her rightarm for any changes      Subjective:   No chief complaint on file. There were no vitals filed for this visit. Wt Readings from Last 3 Encounters:   05/28/21 (!) 343 lb 8 oz (155.8 kg)   05/21/21 (!) 343 lb (155.6 kg)   04/16/21 (!) 329 lb 12.9 oz (149.6 kg)     BP Readings from Last 3 Encounters:   05/28/21 128/74   05/21/21 131/78   04/19/21 135/67     Follow-up on type 1 diabetes hypothyroidism patient is on insulin pump was admitted at [place] [address] patient also has had 2 vaccines   Complains of some dry areas over her right arm denies any fever chills  Reviewed pump setting A1c has improved  Hypothyroidism stable on replacement levothyroxine to 250 mcg daily  Patient also on Metformin for insulin resistance obesity in addition to insulin in the pump      Diabetes  She presents for her follow-up diabetic visit. She has type 1 diabetes mellitus. Associated symptoms include fatigue and weakness. Current diabetic treatment includes insulin pump. Her overall blood glucose range is 130-140 mg/dl.  (Lab Results       Component                Value               Date                       LABA1C                   7.1 (H)             06/28/2021            Reviewed pump download patient Medtronic 670 pump auto mode 65%  Manual mode 35%  Sensor wear 65%  Blood sugar averages 180±70  68% time in range    Pump setting was reviewed  Basal rate 12 AM 1.5 units/h 8 AM 1.6 units/h 12 PM 1.7 units/h 8 PM 1.6 units/h  Carb ratio 12 AM 7  Sensitivity 10) An ACE inhibitor/angiotensin II receptor blocker is being taken. Results for Volodymyr Dill (MRN [id number]) as of 7/2/2021 09:10   Ref.  Range 6/28/2021 09:09 6/28/2021 09:11   Sodium Latest Ref Range: 135 - 144 mEq/L 140    Potassium Latest Ref Range: 3.4 - 4.9 mEq/L 4.7    Chloride Latest Ref Range: 95 - 107 mEq/L 102    CO2 Latest Ref Range: 20 - 31 mEq/L 24    BUN Latest Ref Range: 6 - 20 mg/dL 9    Creatinine Latest Ref Range: 0.50 - 0.90 mg/dL 1.11 (H)    Anion Gap Latest Ref Range: 9 - 15 mEq/L 14    GFR Non- Latest Ref Range: >60  51.9 (L)    GFR  Latest Ref Range: >60  >60.0    Glucose Latest Ref Range: 70 - 99 mg/dL 102 (H)    Calcium Latest Ref Range: 8.5 - 9.9 mg/dL 9.6    Hemoglobin A1C Latest Ref Range: 4.8 - 5.9 % 7.1 (H)    TSH Latest Ref Range: 0.440 - 3.860 uIU/mL 0.624    T4 Free Latest Ref Range: 0.84 - 1.68 ng/dL 1.38    Creatinine, Ur Latest Ref Range: Not Established mg/dL  98.2   Microalbumin Creatinine Ratio Latest Ref Range: 0.0 - 30.0 mg/G  22.4   Microalbumin, Random Urine Latest Ref Range: Not Established mg/dL  2.20 (H)       Past Medical History:   Diagnosis Date    Arthritis     right knee and ankle    Pilonidal cyst     Thyroid disease     hypothyroid    Type 1 diabetes (HCC)      Past Surgical History:   Procedure Laterality Date    APPENDECTOMY      ARTHROSCOPY / ARTHROTOMY KNEE Right 11/8/2016    RIGHT KNEE ARTHROSCOPY / MEDIAL MENISCUS TEAR / SUPINE  performed by Renee Gray MD at [address] [place] [address]     Social History     Socioeconomic History    Marital status:      Spouse name: Not on file    Number of children: Not on file lungs 4 times daily as needed for Wheezing, Disp: 1 Inhaler, Rfl: 0    traMADol (ULTRAM ER) 100 MG extended release tablet, Take 1 tablet by mouth every night as needed for pain, Disp: , Rfl:     metFORMIN (GLUCOPHAGE) 500 MG tablet, TAKE 1 TABLET TWICE DAILY  WITH MEALS, Disp: 180 tablet, Rfl: 2    HUMALOG 100 UNIT/ML injection vial, INJECT PER INSULIN PUMP    MAXIMUM DOSE 100 UNITS PER DAY SUBCUTANEOUSLY, Disp: 90 mL, Rfl: 2    levothyroxine (SYNTHROID) 125 MCG tablet, 2 po daily, Disp: 180 tablet, Rfl: 03    traZODone (DESYREL) 50 MG tablet, TAKE 3 TABLETS AT BEDTIME, Disp: 270 tablet, Rfl: 3    lisinopril (PRINIVIL;ZESTRIL) 10 MG tablet, TAKE 1 TABLET DAILY, Disp: 90 tablet, Rfl: 3    pantoprazole (PROTONIX) 40 MG tablet, TAKE 1 TABLET DAILY, Disp: 90 tablet, Rfl: 3    QUEtiapine (SEROQUEL) 50 MG tablet, TAKE 1 TABLET EVERY MORNINGAND TAKE 2 TABLETS EVERY   EVENING, Disp: 270 tablet, Rfl: 3    albuterol sulfate  (90 Base) MCG/ACT inhaler, INHALE 2 PUFFS EVERY 4 TO 6 HOURS AS NEEDED, Disp: , Rfl: 0    mometasone (ELOCON) 0.1 % cream, Apply topically daily. , Disp: 45 g, Rfl: 2    Calcium Carb-Cholecalciferol (CALCIUM 600 + D) 600-200 MG-UNIT TABS, one a day, Disp: , Rfl:     LYRICA 150 MG capsule, Place 150 mg into the right eye 2 times daily. , Disp: , Rfl: 0    insulin glargine (LANTUS) 100 UNIT/ML injection vial, 40 units at bedtime (Patient taking differently: Indications: Pt to use as backup if insulin pump fails 40 units at bedtime), Disp: 40 mL, Rfl: 3    Insulin Syringe-Needle U-100 ([employer] INSULIN SYRINGE) 31G X 5/16\" 0.5 ML MISC, 1 each by Does not apply route daily, Disp: 300 each, Rfl: 3    INSULIN INFUSION PUMP, by Does not apply route.  , Disp: , Rfl:   Lab Results   Component Value Date     06/28/2021    K 4.7 06/28/2021     06/28/2021    CO2 24 06/28/2021    BUN 9 06/28/2021    CREATININE 1.11 (H) 06/28/2021    GLUCOSE 102 (H) 06/28/2021    CALCIUM 9.6 06/28/2021 PROT 6.4 04/17/2021    LABALBU 2.9 (L) 04/17/2021    BILITOT 0.4 04/17/2021    ALKPHOS 117 04/17/2021    AST 37 (H) 04/17/2021    ALT 41 (H) 04/17/2021    LABGLOM 51.9 (L) 06/28/2021    GFRAA >60.0 06/28/2021    GLOB 3.5 04/17/2021     Lab Results   Component Value Date    WBC 7.9 04/19/2021    HGB 12.0 04/19/2021    HCT 38.0 04/19/2021    MCV 74.2 (L) 04/19/2021     04/19/2021     Lab Results   Component Value Date    LABA1C 7.1 (H) 06/28/2021    LABA1C 8.2 (H) 04/13/2021    LABA1C 7.8 (H) 01/08/2021     Lab Results   Component Value Date    HDL 63 (H) 01/08/2021    HDL 67 (H) 09/25/2018    HDL 75 (H) 06/10/2014    LDLCALC 92 01/08/2021    LDLCALC 84 09/25/2018    LDLCALC 79 06/10/2014    CHOL 173 01/08/2021    CHOL 164 09/25/2018    CHOL 168 06/10/2014    TRIG 89 01/08/2021    TRIG 67 09/25/2018    TRIG 72 06/10/2014       Lab Results   Component Value Date    TSH 0.208 (L) 04/12/2021    TSH 0.861 01/08/2021    TSH 4.300 (H) 01/29/2019    TSHREFLEX 0.624 06/28/2021    TSHREFLEX 7.560 (H) 08/06/2020    TSHREFLEX 4.580 (H) 11/19/2019    T4FREE 1.38 06/28/2021    T4FREE 2.31 (H) 04/12/2021    T4FREE 1.53 01/08/2021       Review of Systems   Constitutional: Positive for fatigue. Respiratory: Negative. Cardiovascular: Negative. Skin: Positive for color change. Neurological: Positive for weakness. All other systems reviewed and are negative. Objective:   Physical Exam  Vitals reviewed. Constitutional:       Appearance: Normal appearance. She is obese. HENT:      Head: Normocephalic and atraumatic. Hair is normal.      Right Ear: External ear normal.      Left Ear: External ear normal.      Nose: Nose normal.   Eyes:      General: No scleral icterus. Right eye: No discharge. Left eye: No discharge. Extraocular Movements: Extraocular movements intact.       Conjunctiva/sclera: Conjunctivae normal.   Neck:      Trachea: Trachea normal.   Cardiovascular:      Rate and Rhythm: Normal rate. Pulmonary:      Effort: Pulmonary effort is normal.   Musculoskeletal:         General: Normal range of motion. Cervical back: Normal range of motion and neck supple. Feet:    Skin:         Neurological:      General: No focal deficit present. Mental Status: She is alert and oriented to person, place, and time.    Psychiatric:         Mood and Affect: Mood normal.         Behavior: Behavior normal.

## 2021-08-10 NOTE — TELEPHONE ENCOUNTER
Future Appointments     Provider Department   8/30/2021 MD MARTIN Mac AT [facility]   Appt Notes: 3 mo Dysuria   Recent Visits    05/28/2021 Pneumonia due to COVID-19 virus   SOJOURN AT [facility] MD Magan

## 2021-08-11 RX ORDER — QUETIAPINE FUMARATE 50 MG/1
TABLET, FILM COATED ORAL
Qty: 270 TABLET | Refills: 3 | Status: SHIPPED | OUTPATIENT
Start: 2021-08-11 | End: 2022-08-04

## 2021-08-11 RX ORDER — TRAZODONE HYDROCHLORIDE 50 MG/1
TABLET ORAL
Qty: 270 TABLET | Refills: 3 | Status: SHIPPED | OUTPATIENT
Start: 2021-08-11 | End: 2022-08-04

## 2021-08-11 RX ORDER — LISINOPRIL 10 MG/1
TABLET ORAL
Qty: 90 TABLET | Refills: 3 | Status: SHIPPED | OUTPATIENT
Start: 2021-08-11 | End: 2022-08-04

## 2021-08-11 RX ORDER — PANTOPRAZOLE SODIUM 40 MG/1
TABLET, DELAYED RELEASE ORAL
Qty: 90 TABLET | Refills: 3 | Status: SHIPPED | OUTPATIENT
Start: 2021-08-11 | End: 2022-08-04

## 2021-08-17 ENCOUNTER — HOSPITAL ENCOUNTER (OUTPATIENT)
Dept: CT IMAGING | Age: 51
Discharge: HOME | End: 2021-08-19
Payer: COMMERCIAL

## 2021-08-17 ENCOUNTER — OFFICE VISIT (OUTPATIENT)
Dept: FAMILY MEDICINE CLINIC | Age: 51
End: 2021-08-17
Payer: COMMERCIAL

## 2021-08-17 VITALS
BODY MASS INDEX: 55.68 KG/M2 | HEIGHT: 66 IN | DIASTOLIC BLOOD PRESSURE: 82 MMHG | SYSTOLIC BLOOD PRESSURE: 130 MMHG | OXYGEN SATURATION: 95 % | HEART RATE: 87 BPM | TEMPERATURE: 95.9 F

## 2021-08-17 DIAGNOSIS — R05.9 COUGH: ICD-10-CM

## 2021-08-17 DIAGNOSIS — R06.00 DYSPNEA, UNSPECIFIED TYPE: ICD-10-CM

## 2021-08-17 DIAGNOSIS — J40 BRONCHITIS: ICD-10-CM

## 2021-08-17 DIAGNOSIS — J40 BRONCHITIS: Primary | ICD-10-CM

## 2021-08-17 PROCEDURE — 71250 CT THORAX DX C-: CPT

## 2021-08-17 PROCEDURE — 99213 OFFICE O/P EST LOW 20 MIN: CPT | Performed by: PHYSICIAN ASSISTANT

## 2021-08-17 RX ORDER — CEPHALEXIN 500 MG/1
500 CAPSULE ORAL 2 TIMES DAILY
Qty: 20 CAPSULE | Refills: 0 | Status: SHIPPED | OUTPATIENT
Start: 2021-08-17 | End: 2021-08-27

## 2021-08-17 RX ORDER — AZITHROMYCIN 500 MG/1
500 TABLET, FILM COATED ORAL DAILY
Qty: 7 TABLET | Refills: 0 | Status: SHIPPED | OUTPATIENT
Start: 2021-08-17 | End: 2021-08-24

## 2021-08-17 RX ORDER — DEXTROMETHORPHAN HYDROBROMIDE AND PROMETHAZINE HYDROCHLORIDE 15; 6.25 MG/5ML; MG/5ML
5 SYRUP ORAL 4 TIMES DAILY PRN
Qty: 150 ML | Refills: 0 | Status: SHIPPED | OUTPATIENT
Start: 2021-08-17 | End: 2021-08-24

## 2021-08-17 ASSESSMENT — ENCOUNTER SYMPTOMS
TROUBLE SWALLOWING: 0
BACK PAIN: 0
CHEST TIGHTNESS: 0
SINUS PRESSURE: 0
ABDOMINAL PAIN: 0
COUGH: 1
SHORTNESS OF BREATH: 1
VOMITING: 0
DIARRHEA: 0

## 2021-08-17 ASSESSMENT — PATIENT HEALTH QUESTIONNAIRE - PHQ9
SUM OF ALL RESPONSES TO PHQ QUESTIONS 1-9: 0
2. FEELING DOWN, DEPRESSED OR HOPELESS: 0
1. LITTLE INTEREST OR PLEASURE IN DOING THINGS: 0
SUM OF ALL RESPONSES TO PHQ9 QUESTIONS 1 & 2: 0
SUM OF ALL RESPONSES TO PHQ QUESTIONS 1-9: 0
SUM OF ALL RESPONSES TO PHQ QUESTIONS 1-9: 0

## 2021-08-17 NOTE — PROGRESS NOTES
Subjective:      Patient ID: Nader Shankar is a 48 y.o. female who presents today for:  Chief Complaint   Patient presents with    Cough     Patient is here c/o cough. Pt states she has had bad coughing for about 1 week. Pt states she is coming out of having pneumonia. Pt denies any pain from cough at this time. Pt states she has used OTC medication with minimal relief. HPI  48year old female who presents with a cough. She was admitted in 4/21 for pneumonia. Complains of intermittent shortness of breath    Past Medical History:   Diagnosis Date    Arthritis     right knee and ankle    Pilonidal cyst     Thyroid disease     hypothyroid    Type 1 diabetes (HCC)      Past Surgical History:   Procedure Laterality Date    APPENDECTOMY      ARTHROSCOPY / ARTHROTOMY KNEE Right 11/8/2016    RIGHT KNEE ARTHROSCOPY / MEDIAL MENISCUS TEAR / SUPINE  performed by Corie Davis MD at [address] [place] [address]     Social History     Socioeconomic History    Marital status:      Spouse name: Not on file    Number of children: Not on file    Years of education: Not on file    Highest education level: Not on file   Occupational History    Not on file   Tobacco Use    Smoking status: Never Smoker    Smokeless tobacco: Never Used   Vaping Use    Vaping Use: Never used   Substance and Sexual Activity    Alcohol use: No    Drug use: No    Sexual activity: Yes   Other Topics Concern    Not on file   Social History Narrative    Not on file     Social Determinants of Health     Financial Resource Strain: Low Risk     Difficulty of Paying Living Expenses: Not hard at all   Food Insecurity: No Food Insecurity    Worried About [address] in the Last Year: Never true    [address] in the Last Year: Never true   Transportation Needs: No Transportation Needs    Lack of Transportation (Medical): No    Lack of Transportation (Non-Medical):  No   Physical Activity:     Days of Exercise per Week:     Minutes of Exercise per Session:    Stress:     Feeling of Stress :    Social Connections:     Frequency of Communication with Friends and Family:     Frequency of Social Gatherings with Friends and Family:     Attends Mandae[employer] Services:     Active Member of Clubs or Organizations:     Attends Club or Organization Meetings:     Marital Status:    Intimate Partner Violence:     Fear of Current or Ex-Partner:     Emotionally Abused:     Physically Abused:     Sexually Abused:      Family History   Problem Relation Age of Onset    Arthritis Mother     Heart Disease Father     High Blood Pressure Father     No Known Problems Daughter      Allergies   Allergen Reactions    Shellfish Allergy Anxiety, Hives, Itching, Shortness Of Breath and Swelling     Current Outpatient Medications   Medication Sig Dispense Refill    cephALEXin (KEFLEX) 500 MG capsule Take 1 capsule by mouth 2 times daily for 10 days 20 capsule 0    azithromycin (ZITHROMAX) 500 MG tablet Take 1 tablet by mouth daily for 7 days 7 tablet 0    promethazine-dextromethorphan (PROMETHAZINE-DM) 6.25-15 MG/5ML syrup Take 5 mLs by mouth 4 times daily as needed for Cough 150 mL 0    lisinopril (PRINIVIL;ZESTRIL) 10 MG tablet TAKE 1 TABLET DAILY 90 tablet 3    QUEtiapine (SEROQUEL) 50 MG tablet TAKE 1 TABLET EVERY MORNINGAND TAKE 2 TABLETS EVERY   EVENING 270 tablet 3    traZODone (DESYREL) 50 MG tablet TAKE 3 TABLETS AT BEDTIME 270 tablet 3    pantoprazole (PROTONIX) 40 MG tablet TAKE 1 TABLET DAILY 90 tablet 3    ALPRAZolam (XANAX) 0.5 MG tablet TAKE 1 TABLET BY MOUTH THREE TIMES DAILY AS NEEDED for sleep or anxiety for up to 30 days 90 tablet 2    albuterol sulfate HFA (VENTOLIN HFA) 108 (90 Base) MCG/ACT inhaler Inhale 2 puffs into the lungs 4 times daily as needed for Wheezing 1 Inhaler 0    traMADol (ULTRAM ER) 100 MG extended release tablet Take 1 tablet by mouth every night as needed for pain      metFORMIN (GLUCOPHAGE) 500 MG tablet TAKE 1 TABLET TWICE DAILY  WITH MEALS 180 tablet 2    HUMALOG 100 UNIT/ML injection vial INJECT PER INSULIN PUMP    MAXIMUM DOSE 100 UNITS PER DAY SUBCUTANEOUSLY 90 mL 2    levothyroxine (SYNTHROID) 125 MCG tablet 2 po daily 180 tablet 03    albuterol sulfate  (90 Base) MCG/ACT inhaler INHALE 2 PUFFS EVERY 4 TO 6 HOURS AS NEEDED  0    mometasone (ELOCON) 0.1 % cream Apply topically daily. 45 g 2    Calcium Carb-Cholecalciferol (CALCIUM 600 + D) 600-200 MG-UNIT TABS one a day      LYRICA 150 MG capsule Place 150 mg into the right eye 2 times daily. 0    insulin glargine (LANTUS) 100 UNIT/ML injection vial 40 units at bedtime (Patient taking differently: Indications: Pt to use as backup if insulin pump fails 40 units at bedtime) 40 mL 3    Insulin Syringe-Needle U-100 ([employer] INSULIN SYRINGE) 31G X 5/16\" 0.5 ML MISC 1 each by Does not apply route daily 300 each 3    INSULIN INFUSION PUMP by Does not apply route.  apixaban (ELIQUIS) 5 MG TABS tablet Take 1 tablet by mouth 2 times daily       No current facility-administered medications for this visit. Review of Systems   Constitutional: Negative for activity change, appetite change, chills, fever and unexpected weight change. HENT: Positive for congestion. Negative for drooling, ear pain, nosebleeds, sinus pressure and trouble swallowing. Respiratory: Positive for cough and shortness of breath. Negative for chest tightness. Cardiovascular: Negative for chest pain and leg swelling. Gastrointestinal: Negative for abdominal pain, diarrhea and vomiting. Endocrine: Negative for polydipsia and polyphagia. Genitourinary: Negative for dysuria, flank pain and frequency. Musculoskeletal: Negative for back pain and myalgias. Skin: Negative for pallor and rash. Neurological: Negative for syncope, weakness and headaches. Hematological: Does not bruise/bleed easily.    Psychiatric/Behavioral: Negative for agitation, behavioral problems and confusion. All other systems reviewed and are negative. Objective:   /82 (Site: Right Upper Arm, Position: Sitting, Cuff Size: Large Adult)   Pulse 87   Temp 95.9 °F (35.5 °C)   Ht 5' 6\" (1.676 m)   SpO2 95%   BMI 55.68 kg/m²     Physical Exam  Vitals and nursing note reviewed. Constitutional:       General: She is awake. She is not in acute distress. Appearance: Normal appearance. She is well-developed and normal weight. She is not ill-appearing, toxic-appearing or diaphoretic. Comments: No photophobia. No phonophobia. HENT:      Head: Normocephalic and atraumatic. No Rizzo's sign. Right Ear: External ear normal.      Left Ear: External ear normal.      Nose: Nose normal. No congestion or rhinorrhea. Mouth/Throat:      Mouth: Mucous membranes are moist.      Pharynx: Oropharynx is clear. No oropharyngeal exudate or posterior oropharyngeal erythema. Eyes:      General: No scleral icterus. Right eye: No foreign body or discharge. Left eye: No discharge. Extraocular Movements: Extraocular movements intact. Conjunctiva/sclera: Conjunctivae normal.      Left eye: No exudate. Pupils: Pupils are equal, round, and reactive to light. Neck:      Vascular: No JVD. Trachea: No tracheal deviation. Comments: No meningismus. Cardiovascular:      Rate and Rhythm: Normal rate and regular rhythm. Heart sounds: Normal heart sounds. Heart sounds not distant. No murmur heard. No friction rub. No gallop. Pulmonary:      Effort: Pulmonary effort is normal. No respiratory distress. Breath sounds: Normal breath sounds. Decreased air movement present. No stridor. No wheezing, rhonchi or rales. Chest:      Chest wall: No tenderness. Abdominal:      General: Abdomen is flat. Bowel sounds are normal. There is no distension. Palpations: Abdomen is soft. There is no mass.       Tenderness: There is no abdominal tenderness. There is no right CVA tenderness, left CVA tenderness, guarding or rebound. Hernia: No hernia is present. Musculoskeletal:         General: No swelling, tenderness, deformity or signs of injury. Normal range of motion. Cervical back: Normal range of motion and neck supple. No rigidity. Lymphadenopathy:      Head:      Right side of head: No submental adenopathy. Left side of head: No submental adenopathy. Skin:     General: Skin is warm and dry. Capillary Refill: Capillary refill takes less than 2 seconds. Coloration: Skin is not jaundiced or pale. Findings: No bruising, erythema, lesion or rash. Neurological:      General: No focal deficit present. Mental Status: She is alert and oriented to person, place, and time. Mental status is at baseline. Cranial Nerves: No cranial nerve deficit. Sensory: No sensory deficit. Motor: No weakness. Coordination: Coordination normal.      Deep Tendon Reflexes: Reflexes are normal and symmetric. Psychiatric:         Mood and Affect: Mood normal.         Behavior: Behavior normal. Behavior is cooperative. Thought Content: Thought content normal.         Judgment: Judgment normal.         Assessment:       Diagnosis Orders   1. Bronchitis  cephALEXin (KEFLEX) 500 MG capsule    azithromycin (ZITHROMAX) 500 MG tablet    CT CHEST WO CONTRAST    promethazine-dextromethorphan (PROMETHAZINE-DM) 6.25-15 MG/5ML syrup   2. Cough  XR CHEST STANDARD (2 VW)    cephALEXin (KEFLEX) 500 MG capsule    azithromycin (ZITHROMAX) 500 MG tablet    CT CHEST WO CONTRAST    promethazine-dextromethorphan (PROMETHAZINE-DM) 6.25-15 MG/5ML syrup   3. Dyspnea, unspecified type  cephALEXin (KEFLEX) 500 MG capsule    azithromycin (ZITHROMAX) 500 MG tablet    CT CHEST WO CONTRAST     No results found for this visit on 08/17/21. Plan:     Assessment & Plan   Brice Ann was seen today for cough.     Diagnoses and all orders for this visit:    Bronchitis  -     cephALEXin (KEFLEX) 500 MG capsule; Take 1 capsule by mouth 2 times daily for 10 days  -     azithromycin (ZITHROMAX) 500 MG tablet; Take 1 tablet by mouth daily for 7 days  -     CT CHEST WO CONTRAST; Future  -     promethazine-dextromethorphan (PROMETHAZINE-DM) 6.25-15 MG/5ML syrup; Take 5 mLs by mouth 4 times daily as needed for Cough    Cough  -     XR CHEST STANDARD (2 VW); Future  -     cephALEXin (KEFLEX) 500 MG capsule; Take 1 capsule by mouth 2 times daily for 10 days  -     azithromycin (ZITHROMAX) 500 MG tablet; Take 1 tablet by mouth daily for 7 days  -     CT CHEST WO CONTRAST; Future  -     promethazine-dextromethorphan (PROMETHAZINE-DM) 6.25-15 MG/5ML syrup; Take 5 mLs by mouth 4 times daily as needed for Cough    Dyspnea, unspecified type  -     cephALEXin (KEFLEX) 500 MG capsule; Take 1 capsule by mouth 2 times daily for 10 days  -     azithromycin (ZITHROMAX) 500 MG tablet; Take 1 tablet by mouth daily for 7 days  -     CT CHEST WO CONTRAST;  Future      Orders Placed This Encounter   Procedures    XR CHEST STANDARD (2 VW)     Standing Status:   Future     Number of Occurrences:   1     Standing Expiration Date:   8/17/2022     Order Specific Question:   Reason for exam:     Answer:   cough    CT CHEST WO CONTRAST     Standing Status:   Future     Standing Expiration Date:   8/17/2022     Order Specific Question:   Reason for exam:     Answer:   xr showing opacity in lungs, r/o pna     Orders Placed This Encounter   Medications    cephALEXin (KEFLEX) 500 MG capsule     Sig: Take 1 capsule by mouth 2 times daily for 10 days     Dispense:  20 capsule     Refill:  0    azithromycin (ZITHROMAX) 500 MG tablet     Sig: Take 1 tablet by mouth daily for 7 days     Dispense:  7 tablet     Refill:  0    promethazine-dextromethorphan (PROMETHAZINE-DM) 6.25-15 MG/5ML syrup     Sig: Take 5 mLs by mouth 4 times daily as needed for Cough     Dispense:  150 mL Refill:  0     There are no discontinued medications. Return if symptoms worsen or fail to improve. Reviewed with the patient/family: current clinical status & medications. Side effects of the medication prescribed today, as well as treatment plan/rationale and result expectations have been discussed with the patient/family who expresses understanding. Patient will be discharged home in stable condition. Follow up with PCP to evaluate treatment results or return if symptoms worsen or fail to improve. Discussed signs and symptoms which require immediate follow-up in ED/call to 911. Understanding verbalized. I have reviewed the patient's medical history in detail and updated the computerized patient record.     TOMY Landeros

## 2021-08-17 NOTE — PATIENT INSTRUCTIONS
Patient Education        Cough: Care Instructions  Your Care Instructions     A cough is your body's response to something that bothers your throat or airways. Many things can cause a cough. You might cough because of a cold or the flu, bronchitis, or asthma. Smoking, postnasal drip, allergies, and stomach acid that backs up into your throat also can cause coughs. A cough is a symptom, not a disease. Most coughs stop when the cause, such as a cold, goes away. You can take a few steps at home to cough less and feel better. Follow-up care is a key part of your treatment and safety. Be sure to make and go to all appointments, and call your doctor if you are having problems. It's also a good idea to know your test results and keep a list of the medicines you take. How can you care for yourself at home? · Drink lots of water and other fluids. This helps thin the mucus and soothes a dry or sore throat. Honey or lemon juice in hot water or tea may ease a dry cough. · Take cough medicine as directed by your doctor. · Prop up your head on pillows to help you breathe and ease a dry cough. · Try cough drops to soothe a dry or sore throat. Cough drops don't stop a cough. Medicine-flavored cough drops are no better than candy-flavored drops or hard candy. · Do not smoke. Avoid secondhand smoke. If you need help quitting, talk to your doctor about stop-smoking programs and medicines. These can increase your chances of quitting for good. When should you call for help? Call 911 anytime you think you may need emergency care. For example, call if:    · You have severe trouble breathing. Call your doctor now or seek immediate medical care if:    · You cough up blood.     · You have new or worse trouble breathing.     · You have a new or higher fever.     · You have a new rash.    Watch closely for changes in your health, and be sure to contact your doctor if:    · You cough more deeply or more often, especially if you notice more mucus or a change in the color of your mucus.     · You have new symptoms, such as a sore throat, an earache, or sinus pain.     · You do not get better as expected. Where can you learn more? Go to [email or web address] and sign in to your [employer] account. Enter D279 in the [employer] box to learn more about \"Cough: Care Instructions. \"     If you do not have an account, please click on the \"Sign Up Now\" link. Current as of: October 26, 2020               Content Version: 12.9  © 5391-1328 [employer]. Care instructions adapted under license by [facility] ([facility]). If you have questions about a medical condition or this instruction, always ask your healthcare professional. [address] any warranty or liability for your use of this information.

## 2021-08-24 ENCOUNTER — OFFICE VISIT (OUTPATIENT)
Dept: PULMONOLOGY | Age: 51
End: 2021-08-24
Payer: COMMERCIAL

## 2021-08-24 VITALS
HEIGHT: 66 IN | DIASTOLIC BLOOD PRESSURE: 79 MMHG | OXYGEN SATURATION: 98 % | HEART RATE: 115 BPM | TEMPERATURE: 98.2 F | BODY MASS INDEX: 47.09 KG/M2 | SYSTOLIC BLOOD PRESSURE: 135 MMHG | WEIGHT: 293 LBS

## 2021-08-24 DIAGNOSIS — E66.01 MORBID OBESITY (HCC): ICD-10-CM

## 2021-08-24 DIAGNOSIS — U07.1 COVID-19 VIRUS INFECTION: Primary | ICD-10-CM

## 2021-08-24 PROCEDURE — 99214 OFFICE O/P EST MOD 30 MIN: CPT | Performed by: INTERNAL MEDICINE

## 2021-08-24 ASSESSMENT — ENCOUNTER SYMPTOMS
WHEEZING: 0
RHINORRHEA: 0
NAUSEA: 0
DIARRHEA: 0
TROUBLE SWALLOWING: 0
VOMITING: 0
SORE THROAT: 0
COUGH: 1
EYE ITCHING: 0
SHORTNESS OF BREATH: 0
VOICE CHANGE: 0
EYE DISCHARGE: 0
SINUS PRESSURE: 0
CHEST TIGHTNESS: 0
ABDOMINAL PAIN: 0

## 2021-08-24 NOTE — PROGRESS NOTES
Subjective:     Alida Mix is a 48 y.o. female who complains today of:     Chief Complaint   Patient presents with    Pneumonia     3 month f/u covid       HPI  She had CXR 8/17/2021 which shows faint nodular density within the left lower lobe and recommend CT chest without contrast to further evaluate. CT chest done on 8/17/2021. No focal consolidation, pleural effusion or pneumothorax. Stable 4 mm right middle lobe pulmonary nodule. Cholelithiasis. She has no history of smoking. She was in hospital for Covid 19 pneumonia  On 4/12/21. She is doing better at this time. She has some cough for a few days but it has improved. She has O2 but not using it  And want to d/c   No C/o shortness of breath   No Wheezing. Minimal Cough no Sputum. No Hemoptysis. No Chest tightness. No Chest pain with radiation  or pleuritic pain. No  leg edema. No orthopnea. No Fever or chills. No Rhinorrhea and postnasal drip.       Allergies:  Shellfish allergy  Past Medical History:   Diagnosis Date    Arthritis     right knee and ankle    Pilonidal cyst     Thyroid disease     hypothyroid    Type 1 diabetes (HCC)      Past Surgical History:   Procedure Laterality Date    APPENDECTOMY      ARTHROSCOPY / ARTHROTOMY KNEE Right 11/8/2016    RIGHT KNEE ARTHROSCOPY / MEDIAL MENISCUS TEAR / SUPINE  performed by Alisa Beard MD at [address] [place] [address]     Family History   Problem Relation Age of Onset    Arthritis Mother     Heart Disease Father     High Blood Pressure Father     No Known Problems Daughter      Social History     Socioeconomic History    Marital status:      Spouse name: Not on file    Number of children: Not on file    Years of education: Not on file    Highest education level: Not on file   Occupational History    Not on file   Tobacco Use    Smoking status: Never Smoker    Smokeless tobacco: Never Used   Vaping Use    Vaping Use: Never used   Substance and Sexual Activity    Alcohol use: No    Drug use: No    Sexual activity: Yes   Other Topics Concern    Not on file   Social History Narrative    Not on file     Social Determinants of Health     Financial Resource Strain: Low Risk     Difficulty of Paying Living Expenses: Not hard at all   Food Insecurity: No Food Insecurity    Worried About Running Out of Food in the Last Year: Never true    Jason of Food in the Last Year: Never true   Transportation Needs: No Transportation Needs    Lack of Transportation (Medical): No    Lack of Transportation (Non-Medical): No   Physical Activity:     Days of Exercise per Week:     Minutes of Exercise per Session:    Stress:     Feeling of Stress :    Social Connections:     Frequency of Communication with Friends and Family:     Frequency of Social Gatherings with Friends and Family:     Attends Church Services:     Active Member of Clubs or Organizations:     Attends Club or Organization Meetings:     Marital Status:    Intimate Partner Violence:     Fear of Current or Ex-Partner:     Emotionally Abused:     Physically Abused:     Sexually Abused:          Review of Systems   Constitutional: Negative for chills, diaphoresis, fatigue and fever. HENT: Negative for congestion, mouth sores, nosebleeds, postnasal drip, rhinorrhea, sinus pressure, sneezing, sore throat, trouble swallowing and voice change. Eyes: Negative for discharge, itching and visual disturbance. Respiratory: Positive for cough. Negative for chest tightness, shortness of breath and wheezing. Cardiovascular: Negative for chest pain, palpitations and leg swelling. Gastrointestinal: Negative for abdominal pain, diarrhea, nausea and vomiting. Genitourinary: Negative for difficulty urinating and hematuria. Musculoskeletal: Negative for arthralgias, joint swelling and myalgias. Skin: Negative for rash. Allergic/Immunologic: Negative for environmental allergies and food allergies. Neurological: Negative for dizziness, tremors, weakness and headaches. Psychiatric/Behavioral: Negative for behavioral problems and sleep disturbance.         :     Vitals:    08/24/21 1154   BP: 135/79   Pulse: 115   Temp: 98.2 °F (36.8 °C)   SpO2: 98%   Weight: (!) 348 lb (157.9 kg)   Height: 5' 6\" (1.676 m)     Wt Readings from Last 3 Encounters:   08/24/21 (!) 348 lb (157.9 kg)   07/02/21 (!) 345 lb (156.5 kg)   05/28/21 (!) 343 lb 8 oz (155.8 kg)         Physical Exam  Constitutional:       General: She is not in acute distress. Appearance: She is well-developed. She is obese. She is not diaphoretic. HENT:      Head: Normocephalic and atraumatic. Nose: Nose normal.   Eyes:      Pupils: Pupils are equal, round, and reactive to light. Neck:      Thyroid: No thyromegaly. Vascular: No JVD. Trachea: No tracheal deviation. Cardiovascular:      Rate and Rhythm: Normal rate and regular rhythm. Heart sounds: No murmur heard. No friction rub. No gallop. Pulmonary:      Effort: No respiratory distress. Breath sounds: No wheezing or rales. Chest:      Chest wall: No tenderness. Abdominal:      General: There is no distension. Tenderness: There is no abdominal tenderness. There is no rebound. Musculoskeletal:         General: Normal range of motion. Lymphadenopathy:      Cervical: No cervical adenopathy. Skin:     General: Skin is warm and dry. Neurological:      Mental Status: She is alert and oriented to person, place, and time.       Coordination: Coordination normal.         Current Outpatient Medications   Medication Sig Dispense Refill    OXYGEN Discontinue Oxygen 1 Units 0    cephALEXin (KEFLEX) 500 MG capsule Take 1 capsule by mouth 2 times daily for 10 days 20 capsule 0    promethazine-dextromethorphan (PROMETHAZINE-DM) 6.25-15 MG/5ML syrup Take 5 mLs by mouth 4 times daily as needed for Cough 150 mL 0    lisinopril (PRINIVIL;ZESTRIL) 10 MG tablet TAKE 1 TABLET DAILY 90 tablet 3    QUEtiapine (SEROQUEL) 50 MG tablet TAKE 1 TABLET EVERY MORNINGAND TAKE 2 TABLETS EVERY   EVENING 270 tablet 3    traZODone (DESYREL) 50 MG tablet TAKE 3 TABLETS AT BEDTIME 270 tablet 3    pantoprazole (PROTONIX) 40 MG tablet TAKE 1 TABLET DAILY 90 tablet 3    ALPRAZolam (XANAX) 0.5 MG tablet TAKE 1 TABLET BY MOUTH THREE TIMES DAILY AS NEEDED for sleep or anxiety for up to 30 days 90 tablet 2    albuterol sulfate HFA (VENTOLIN HFA) 108 (90 Base) MCG/ACT inhaler Inhale 2 puffs into the lungs 4 times daily as needed for Wheezing 1 Inhaler 0    traMADol (ULTRAM ER) 100 MG extended release tablet Take 1 tablet by mouth every night as needed for pain      metFORMIN (GLUCOPHAGE) 500 MG tablet TAKE 1 TABLET TWICE DAILY  WITH MEALS 180 tablet 2    HUMALOG 100 UNIT/ML injection vial INJECT PER INSULIN PUMP    MAXIMUM DOSE 100 UNITS PER DAY SUBCUTANEOUSLY 90 mL 2    levothyroxine (SYNTHROID) 125 MCG tablet 2 po daily 180 tablet 03    albuterol sulfate  (90 Base) MCG/ACT inhaler INHALE 2 PUFFS EVERY 4 TO 6 HOURS AS NEEDED  0    mometasone (ELOCON) 0.1 % cream Apply topically daily. 45 g 2    Calcium Carb-Cholecalciferol (CALCIUM 600 + D) 600-200 MG-UNIT TABS one a day      LYRICA 150 MG capsule Place 150 mg into the right eye 2 times daily. 0    insulin glargine (LANTUS) 100 UNIT/ML injection vial 40 units at bedtime (Patient taking differently: Indications: Pt to use as backup if insulin pump fails 40 units at bedtime) 40 mL 3    Insulin Syringe-Needle U-100 ([employer] INSULIN SYRINGE) 31G X 5/16\" 0.5 ML MISC 1 each by Does not apply route daily 300 each 3    INSULIN INFUSION PUMP by Does not apply route.         azithromycin (ZITHROMAX) 500 MG tablet Take 1 tablet by mouth daily for 7 days (Patient not taking: Reported on 8/24/2021) 7 tablet 0    apixaban (ELIQUIS) 5 MG TABS tablet Take 1 tablet by mouth 2 times daily       No current facility-administered medications for this visit. Results for orders placed in visit on 08/17/21    XR CHEST STANDARD (2 VW)    Narrative  EXAMINATION: XR CHEST (2 VW)    CLINICAL HISTORY: R05 Cough ICD10    COMPARISONS: April 12, 2021    FINDINGS:    Two views of the chest are submitted. The cardiac silhouette is of normal size configuration. Pulmonary vascular unremarkable. Right sided trachea. Small bibasilar areas of atelectasis unchanged nodular opacity within the left lower lobe. No focal infiltrates. No effusions. No Pneumothoraces. Impression  FAINT NODULAR OPACITY WITHIN THE LEFT LOWER LOBE. RECOMMEND CT SCAN CHEST WITHOUT IV CONTRAST TO FURTHER EVALUATE      Results for orders placed during the hospital encounter of 03/04/17    XR Chest Standard TWO VW    Narrative  TWO-VIEW CHEST: 3/4/2017    CLINICAL HISTORY: Shortness of breath. COMPARISON: None available. Upright PA and lateral radiographs of the chest were obtained. FINDINGS:    The lungs are clear. The heart is mildly enlarged. There is no significant pleural effusion, vascular congestion, pneumothorax, or fractures identified. Impression  MILD COMPENSATED CARDIOMEGALY. NO EVIDENCE OF ACTIVE CARDIOPULMONARY DISEASE.  ]  Results for orders placed during the hospital encounter of 04/12/21    XR CHEST PORTABLE    Narrative  EXAMINATION: XR CHEST PORTABLE    CLINICAL HISTORY: SHORTNESS OF BREATH    COMPARISONS: MARCH 4, 2017    FINDINGS: Image performed in partial expiration. Cardiopericardial silhouette enlarged. Patchy area of increased opacity left upper, mid and lower lung. Pulmonary vasculature indistinct. Impression  CARDIOMEGALY.  LEFT LUNG ATELECTASIS/PNEUMONIA, PULMONARY VENOUS CONGESTION, IN THIS PARTIALLY EXPIRATORY CHEST RADIOGRAPH  ]  No results found for this or any previous visit.  ]  Results for orders placed during the hospital encounter of 08/17/21    CT CHEST WO CONTRAST    Narrative  Exam: CT CHEST WO CONTRAST dated 8/17/2021 4:45 PM    CLINICAL HISTORY: R05 Cough ICD10    COMPARISON: CT chest 4/12/2021    TECHNIQUE: Multidetector CT imaging was obtained through the chest in the supine position during the administration of intravenous contrast. The images were reconstructed with 5 mm collimation. Additional axial MIP images were reconstructed. FINDINGS:    AIRWAYS & LUNGS: The central airways are patent. Interval resolution of the diffuse bilateral consolidative and groundglass opacities. No new focal consolidation. Mild bibasilar atelectasis. Stable 4 mm right middle lobe pulmonary nodule, along the minor fissure (series 2 image 27). PLEURA: No pleural effusions or pneumothorax. LOWER NECK: Unremarkable    HEART: The heart is normal in size. No significant pericardial effusion. Mild calcified plaque throughout the coronary arteries. THORACIC AORTA: Normal caliber and contour. PULMONARY ARTERIES: Normal in caliber. MEDIASTINUM AND LETY: No pathologically enlarged lymph nodes are identified. CHEST WALL AND AXILLA: Unremarkable. UPPER ABDOMEN: Cholelithiasis. MUSCULOSKELETAL: No suspicious osteolytic or osteoblastic lesions. Impression  No focal consolidation, pleural effusion or pneumothorax. Stable 4 mm right middle lobe pulmonary nodule. Cholelithiasis. ______________________________________________________    Fleischner Society 2017 Guidelines for Management of Incidentally Detected Pulmonary Nodules in Adults    A: Solid Nodules*    Single    Low risk**  <6 mm     No routine follow-up  6-8 mm     CT at 6-12 months, then consider CT at 18-24 months  >8 mm     Consider CT at 3 months, PET/CT, or tissue sampling  Nodules <6 mm do not require routine follow-up, but certain patients at high risk with suspicious nodule morphology, upper lobe location, or both may warrant 12-month follow-up (recommendation 1A).     High risk**  <6 mm     Optional CT at 12 months  6-8 mm     CT at 6-12 months, then CT at 18-24 months  >8 mm     Consider CT at 3 months, PET/CT, or tissue sampling  Nodules <6 mm do not require routine follow-up, but certain patients at high risk with suspicious nodule morphology, upper lobe location, or both may warrant 12-month follow-up (recommendation 1A). Multiple    Low risk**  <6 mm     No routine follow-up  6-8 mm     CT at 3-6 months, then consider CT at 18-24 months  >8 mm     CT at 3-6 months, then  consider CT at 18-24 months  Use most suspicious nodule as guide to management. Follow-up intervals may vary according to size and risk (recommendation 2A). High risk**  <6 mm     Optional CT at 12 months  6-8 mm     CT at 3-6 months, then at 18-24 months  >8 mm     CT at 3-6 months, then at 18-24 months  Use most suspicious nodule as guide to management. Follow-up intervals may vary according to size and risk (recommendation 2A). B: Subsolid Nodules*    Single    Ground glass  <6 mm     No routine follow-up  >=6 mm     CT at 6-12 months to confirm persistence, then CT  every 2 years until 5 years  In certain suspicious nodules <6 mm, consider follow-up at 2 and 4 years. If solid component(s) or growth develops, consider resection. (Recommendations 3A and 4A). Part solid  <6 mm     No routine follow-up  >=6 mm     CT at 3-6 months to confirm persistence. If unchanged and solid component remains <6 mm, annual CT  should be performed for 5 years. In practice, part-solid nodules cannot be defined as such until >=6 mm, and nodules <6 mm do not usually require follow-up. Persistent part-solid nodules with solid components >=6 mm should be considered highly suspicious (recommendations 4A-4C)    Multiple  <6 mm     CT at 3-6 months. If stable, consider CT at 2 and 4 years. >=6 mm     CT at 3-6 months. Subsequent management based  on the most suspicious nodule(s).   Multiple <6 mm pure ground-glass nodules are usually benign, but consider follow-up in selected patients at high risk at 2 and 4 years (recommendation 5A). Note. --These recommendations do not apply to lung cancer screening, patients with immunosuppression, or patients with known primary cancer. * Dimensions are average of long and short axes, rounded to the nearest millimeter. ** Consider all relevant risk factors (see Risk Factors). ____________________________________________________________    Assessment/Plan:     1. COVID-19 virus infection  She was seen by TOMY Zafar and had chest x-ray done which shows faint density in left lower lobe and recommend CT chest.  She also underwent for CT chest which shows stable 4 mm right middle lobe lung nodule. Interval resolution of bilateral groundglass opacity. No no focal new infiltration. Mild bibasilar atelectasis. And cholelithiasis. She has no history of smoking. May follow-up with PCP regarding lung nodule. She said she has minimal cough after she has bronchitis. No complaint of shortness of breath. She is not using oxygen anymore for long time and need a prescription to discontinue. Will write a prescription for to discontinue oxygen.      - OXYGEN; Discontinue Oxygen  Dispense: 1 Units; Refill: 0    2. Morbid obesity ([address]  She is advised try to lose weight. obesity related risk explained to the patient ,  Current weight:  (!) 348 lb (157.9 kg) Lbs. BMI:  Body mass index is 56.17 kg/m². Suggested weight control approaches, including dietary changes , exercise, behavioral modification.   She does not want to go for  Sleep apnea evaluation    No follow up made with me, f/u with PCP from now onward        Kana Sierra MD

## 2021-09-16 PROBLEM — R05.9 COUGH: Status: RESOLVED | Noted: 2021-08-17 | Resolved: 2021-09-16

## 2021-11-11 ENCOUNTER — OFFICE VISIT (OUTPATIENT)
Dept: FAMILY MEDICINE CLINIC | Age: 51
End: 2021-11-11
Payer: COMMERCIAL

## 2021-11-11 VITALS
TEMPERATURE: 97.2 F | SYSTOLIC BLOOD PRESSURE: 138 MMHG | OXYGEN SATURATION: 94 % | WEIGHT: 293 LBS | DIASTOLIC BLOOD PRESSURE: 74 MMHG | HEIGHT: 66 IN | BODY MASS INDEX: 47.09 KG/M2 | HEART RATE: 101 BPM

## 2021-11-11 DIAGNOSIS — F41.0 PANIC ATTACKS: ICD-10-CM

## 2021-11-11 DIAGNOSIS — Z23 NEED FOR 23-POLYVALENT PNEUMOCOCCAL POLYSACCHARIDE VACCINE: ICD-10-CM

## 2021-11-11 DIAGNOSIS — Z12.31 ENCOUNTER FOR SCREENING MAMMOGRAM FOR BREAST CANCER: ICD-10-CM

## 2021-11-11 DIAGNOSIS — F41.9 ANXIETY: Primary | ICD-10-CM

## 2021-11-11 DIAGNOSIS — E10.9 TYPE 1 DIABETES MELLITUS WITHOUT COMPLICATION (HCC): ICD-10-CM

## 2021-11-11 PROCEDURE — 99213 OFFICE O/P EST LOW 20 MIN: CPT | Performed by: FAMILY MEDICINE

## 2021-11-11 PROCEDURE — 90732 PPSV23 VACC 2 YRS+ SUBQ/IM: CPT | Performed by: FAMILY MEDICINE

## 2021-11-11 PROCEDURE — 90471 IMMUNIZATION ADMIN: CPT | Performed by: FAMILY MEDICINE

## 2021-11-11 PROCEDURE — 3051F HG A1C>EQUAL 7.0%<8.0%: CPT | Performed by: FAMILY MEDICINE

## 2021-11-11 ASSESSMENT — ENCOUNTER SYMPTOMS
EYES NEGATIVE: 1
CHEST TIGHTNESS: 0
RHINORRHEA: 0
GASTROINTESTINAL NEGATIVE: 1
RESPIRATORY NEGATIVE: 1
COUGH: 0

## 2021-11-11 NOTE — PROGRESS NOTES
Patient is seen in follow up for   Chief Complaint   Patient presents with    Diabetes     Last A1C done 6/28/21, was 7.1. Follows Dr. Ivory Raines for DM Mgmt.  Anxiety     3 mo controlled medication checkup - Alprazolam     Diabetes  She presents for her follow-up diabetic visit. She has type 2 diabetes mellitus. Her disease course has been stable. There are no hypoglycemic associated symptoms. Pertinent negatives for hypoglycemia include no dizziness. There are no diabetic associated symptoms. Pertinent negatives for diabetes include no fatigue. There are no hypoglycemic complications. Symptoms are stable. There are no diabetic complications. Risk factors for coronary artery disease include diabetes mellitus, hypertension and obesity. Current diabetic treatment includes insulin injections. She is compliant with treatment most of the time. There is no change in her home blood glucose trend.        Past Medical History:   Diagnosis Date    Arthritis     right knee and ankle    Pilonidal cyst     Thyroid disease     hypothyroid    Type 1 diabetes [facility])      Patient Active Problem List    Diagnosis Date Noted    Bronchitis 08/17/2021    Dyspnea 08/17/2021    Hypoxia 05/21/2021    COVID-19 virus infection     Respiratory failure ([address] 04/12/2021    Brow ptosis 01/22/2016    Long-term insulin use ([address] 01/22/2016    Nuclear sclerotic cataract of both eyes 01/22/2016    Background retinopathy due to secondary diabetes ([address] 10/16/2014    Age related cataract 10/16/2014    Type 1 diabetes mellitus ([address] 07/02/2014    Obesity 08/18/2013    Panic attacks 11/29/2012    Pilonidal cyst     Vitamin D deficiency 03/28/2011    Chronic lymphocytic thyroiditis 11/05/2002    Acquired hypothyroidism 11/05/2002     Past Surgical History:   Procedure Laterality Date    APPENDECTOMY      ARTHROSCOPY / ARTHROTOMY KNEE Right 11/8/2016    RIGHT KNEE ARTHROSCOPY / MEDIAL MENISCUS TEAR / SUPINE  performed by Amelia Sprague Last Year: Not on file    Unstable Housing in the Last Year: Not on file     Current Outpatient Medications   Medication Sig Dispense Refill    OXYGEN Discontinue Oxygen 1 Units 0    lisinopril (PRINIVIL;ZESTRIL) 10 MG tablet TAKE 1 TABLET DAILY 90 tablet 3    QUEtiapine (SEROQUEL) 50 MG tablet TAKE 1 TABLET EVERY MORNINGAND TAKE 2 TABLETS EVERY   EVENING 270 tablet 3    traZODone (DESYREL) 50 MG tablet TAKE 3 TABLETS AT BEDTIME 270 tablet 3    pantoprazole (PROTONIX) 40 MG tablet TAKE 1 TABLET DAILY 90 tablet 3    albuterol sulfate HFA (VENTOLIN HFA) 108 (90 Base) MCG/ACT inhaler Inhale 2 puffs into the lungs 4 times daily as needed for Wheezing 1 Inhaler 0    traMADol (ULTRAM ER) 100 MG extended release tablet Take 1 tablet by mouth every night as needed for pain      metFORMIN (GLUCOPHAGE) 500 MG tablet TAKE 1 TABLET TWICE DAILY  WITH MEALS 180 tablet 2    HUMALOG 100 UNIT/ML injection vial INJECT PER INSULIN PUMP    MAXIMUM DOSE 100 UNITS PER DAY SUBCUTANEOUSLY 90 mL 2    levothyroxine (SYNTHROID) 125 MCG tablet 2 po daily 180 tablet 03    albuterol sulfate  (90 Base) MCG/ACT inhaler INHALE 2 PUFFS EVERY 4 TO 6 HOURS AS NEEDED  0    mometasone (ELOCON) 0.1 % cream Apply topically daily. 45 g 2    Calcium Carb-Cholecalciferol (CALCIUM 600 + D) 600-200 MG-UNIT TABS one a day      LYRICA 150 MG capsule Place 150 mg into the right eye 2 times daily. 0    insulin glargine (LANTUS) 100 UNIT/ML injection vial 40 units at bedtime (Patient taking differently: Indications: Pt to use as backup if insulin pump fails 40 units at bedtime) 40 mL 3    Insulin Syringe-Needle U-100 ([employer] INSULIN SYRINGE) 31G X 5/16\" 0.5 ML MISC 1 each by Does not apply route daily 300 each 3    INSULIN INFUSION PUMP by Does not apply route.         ALPRAZolam (XANAX) 0.5 MG tablet TAKE 1 TABLET BY MOUTH THREE TIMES DAILY AS NEEDED for sleep or anxiety for up to 30 days 90 tablet 2     No current facility-administered medications for this visit. Current Outpatient Medications on File Prior to Visit   Medication Sig Dispense Refill    OXYGEN Discontinue Oxygen 1 Units 0    lisinopril (PRINIVIL;ZESTRIL) 10 MG tablet TAKE 1 TABLET DAILY 90 tablet 3    QUEtiapine (SEROQUEL) 50 MG tablet TAKE 1 TABLET EVERY MORNINGAND TAKE 2 TABLETS EVERY   EVENING 270 tablet 3    traZODone (DESYREL) 50 MG tablet TAKE 3 TABLETS AT BEDTIME 270 tablet 3    pantoprazole (PROTONIX) 40 MG tablet TAKE 1 TABLET DAILY 90 tablet 3    albuterol sulfate HFA (VENTOLIN HFA) 108 (90 Base) MCG/ACT inhaler Inhale 2 puffs into the lungs 4 times daily as needed for Wheezing 1 Inhaler 0    traMADol (ULTRAM ER) 100 MG extended release tablet Take 1 tablet by mouth every night as needed for pain      metFORMIN (GLUCOPHAGE) 500 MG tablet TAKE 1 TABLET TWICE DAILY  WITH MEALS 180 tablet 2    HUMALOG 100 UNIT/ML injection vial INJECT PER INSULIN PUMP    MAXIMUM DOSE 100 UNITS PER DAY SUBCUTANEOUSLY 90 mL 2    levothyroxine (SYNTHROID) 125 MCG tablet 2 po daily 180 tablet 03    albuterol sulfate  (90 Base) MCG/ACT inhaler INHALE 2 PUFFS EVERY 4 TO 6 HOURS AS NEEDED  0    mometasone (ELOCON) 0.1 % cream Apply topically daily. 45 g 2    Calcium Carb-Cholecalciferol (CALCIUM 600 + D) 600-200 MG-UNIT TABS one a day      LYRICA 150 MG capsule Place 150 mg into the right eye 2 times daily. 0    insulin glargine (LANTUS) 100 UNIT/ML injection vial 40 units at bedtime (Patient taking differently: Indications: Pt to use as backup if insulin pump fails 40 units at bedtime) 40 mL 3    Insulin Syringe-Needle U-100 ([employer] INSULIN SYRINGE) 31G X 5/16\" 0.5 ML MISC 1 each by Does not apply route daily 300 each 3    INSULIN INFUSION PUMP by Does not apply route.         ALPRAZolam (XANAX) 0.5 MG tablet TAKE 1 TABLET BY MOUTH THREE TIMES DAILY AS NEEDED for sleep or anxiety for up to 30 days 90 tablet 2     No current facility-administered medications on file prior to visit. Allergies   Allergen Reactions    Shellfish Allergy Anxiety, Hives, Itching, Shortness Of Breath and Swelling     Health Maintenance   Topic Date Due    DTaP/Tdap/Td vaccine (1 - Tdap) Never done    Cervical cancer screen  Never done    Hepatitis B vaccine (3 of 3 - Risk 3-dose series) 06/28/2011    Colon cancer screen colonoscopy  Never done    Breast cancer screen  11/01/2020    Shingles Vaccine (1 of 2) Never done    Flu vaccine (1) 11/11/2022 (Originally 9/1/2021)    HIV screen  11/21/2028 (Originally 11/1/1985)    COVID-19 Vaccine (3 - Booster for Pfizer series) 12/10/2021    Lipid screen  01/08/2022    Diabetic retinal exam  04/05/2022    A1C test (Diabetic or Prediabetic)  06/28/2022    Diabetic microalbuminuria test  06/28/2022    TSH testing  06/28/2022    Potassium monitoring  06/28/2022    Creatinine monitoring  06/28/2022    Diabetic foot exam  07/02/2022    Pneumococcal 0-64 years Vaccine (2 of 2 - PPSV23) 11/01/2035    Hepatitis C screen  Completed    Hepatitis A vaccine  Aged Out    Hib vaccine  Aged Out    Meningococcal (ACWY) vaccine  Aged Out       Review of Systems     Review of Systems   Constitutional: Negative for activity change, appetite change, fatigue and fever. HENT: Negative for congestion and rhinorrhea. Eyes: Negative. Respiratory: Negative. Negative for cough and chest tightness. Cardiovascular: Negative. Gastrointestinal: Negative. Endocrine: Negative. Genitourinary: Negative. Musculoskeletal: Negative. Skin: Negative. Neurological: Negative for dizziness, light-headedness and numbness. Hematological: Negative. Psychiatric/Behavioral: Negative.         Physical Exam  Vitals:    11/11/21 0834   BP: 138/74   Site: Left Upper Arm   Position: Sitting   Cuff Size: Large Adult   Pulse: 101   Temp: 97.2 °F (36.2 °C)   TempSrc: Infrared   SpO2: 94%   Weight: (!) 356 lb 6.4 oz (161.7 kg)   Height: 5' 6\" (1.676 m) Physical Exam  Constitutional:       Appearance: She is well-developed. HENT:      Right Ear: External ear normal.      Left Ear: External ear normal.   Eyes:      Pupils: Pupils are equal, round, and reactive to light. Neck:      Thyroid: No thyromegaly. Cardiovascular:      Rate and Rhythm: Normal rate and regular rhythm. Heart sounds: Normal heart sounds. No murmur heard. No friction rub. No gallop. Pulmonary:      Effort: Pulmonary effort is normal. No respiratory distress. Breath sounds: No wheezing or rales. Chest:      Chest wall: No tenderness. Abdominal:      General: Bowel sounds are normal. There is no distension. Palpations: Abdomen is soft. There is no mass. Tenderness: There is no abdominal tenderness. There is no guarding or rebound. Musculoskeletal:         General: Normal range of motion. Cervical back: Normal range of motion and neck supple. Lymphadenopathy:      Cervical: No cervical adenopathy. Skin:     General: Skin is warm and dry. Neurological:      Mental Status: She is alert and oriented to person, place, and time. Cranial Nerves: No cranial nerve deficit. Coordination: Coordination normal.         Assessment   Diagnosis Orders   1. Anxiety     2. Need for 23-polyvalent pneumococcal polysaccharide vaccine  PNEUMOVAX 23 subcutaneous/IM (Pneumococcal polysaccharide vaccine 23-valent >= 3yo)   3. Encounter for screening mammogram for breast cancer  [facility] DIGITAL SCREEN W OR WO CAD BILATERAL   4. Panic attacks     5.  Type 1 diabetes mellitus without complication (HCC)       Problem List     Panic attacks    Relevant Medications    traZODone (DESYREL) 50 MG tablet    Type 1 diabetes mellitus (HCC)    Relevant Medications    insulin glargine (LANTUS) 100 UNIT/ML injection vial    metFORMIN (GLUCOPHAGE) 500 MG tablet    HUMALOG 100 UNIT/ML injection vial          Plan  Orders Placed This Encounter   Procedures    [facility] DIGITAL SCREEN W OR WO CAD BILATERAL     Standing Status:   Future     Standing Expiration Date:   1/11/2023    PNEUMOVAX 23 subcutaneous/IM (Pneumococcal polysaccharide vaccine 23-valent >= 1yo)     No orders of the defined types were placed in this encounter. Return in about 3 months (around 2/11/2022).   Nigel Jimenez MD

## 2021-11-12 RX ORDER — LEVOTHYROXINE SODIUM 0.12 MG/1
TABLET ORAL
Qty: 180 TABLET | Refills: 3 | Status: SHIPPED | OUTPATIENT
Start: 2021-11-12

## 2021-12-20 DIAGNOSIS — E03.9 ACQUIRED HYPOTHYROIDISM: ICD-10-CM

## 2021-12-20 DIAGNOSIS — E10.9 TYPE 1 DIABETES MELLITUS WITHOUT COMPLICATION (HCC): ICD-10-CM

## 2021-12-20 LAB
ANION GAP SERPL CALCULATED.3IONS-SCNC: 12 MEQ/L (ref 9–15)
BUN BLDV-MCNC: 16 MG/DL (ref 6–20)
CALCIUM SERPL-MCNC: 9.1 MG/DL (ref 8.5–9.9)
CHLORIDE BLD-SCNC: 100 MEQ/L (ref 95–107)
CO2: 24 MEQ/L (ref 20–31)
CREAT SERPL-MCNC: 1.37 MG/DL (ref 0.5–0.9)
GFR AFRICAN AMERICAN: 49.2
GFR NON-AFRICAN AMERICAN: 40.6
GLUCOSE BLD-MCNC: 151 MG/DL (ref 70–99)
HBA1C MFR BLD: 7.3 % (ref 4.8–5.9)
POTASSIUM SERPL-SCNC: 4.7 MEQ/L (ref 3.4–4.9)
SODIUM BLD-SCNC: 136 MEQ/L (ref 135–144)
T4 FREE: 1.64 NG/DL (ref 0.84–1.68)
TSH SERPL DL<=0.05 MIU/L-ACNC: 1.51 UIU/ML (ref 0.44–3.86)

## 2022-02-08 ENCOUNTER — OFFICE VISIT (OUTPATIENT)
Dept: ENDOCRINOLOGY | Age: 52
End: 2022-02-08
Payer: COMMERCIAL

## 2022-02-08 VITALS
DIASTOLIC BLOOD PRESSURE: 82 MMHG | HEIGHT: 67 IN | OXYGEN SATURATION: 95 % | SYSTOLIC BLOOD PRESSURE: 155 MMHG | WEIGHT: 293 LBS | HEART RATE: 102 BPM | BODY MASS INDEX: 45.99 KG/M2

## 2022-02-08 DIAGNOSIS — E10.9 TYPE 1 DIABETES MELLITUS WITHOUT COMPLICATION (HCC): ICD-10-CM

## 2022-02-08 DIAGNOSIS — E03.9 ACQUIRED HYPOTHYROIDISM: Primary | ICD-10-CM

## 2022-02-08 PROCEDURE — 99213 OFFICE O/P EST LOW 20 MIN: CPT | Performed by: INTERNAL MEDICINE

## 2022-02-08 ASSESSMENT — ENCOUNTER SYMPTOMS: BACK PAIN: 1

## 2022-02-08 NOTE — PROGRESS NOTES
2/8/2022    Assessment:       Diagnosis Orders   1. Acquired hypothyroidism     2. Type 1 diabetes mellitus without complication (HCC)           PLAN:     Increase water intake monitor creatinine number advised patient to not use NSAIDs  Continue current dose of insulin continue current dose of levothyroxine follow-up in 3 months    Continue insulin via current pump setting continue levothyroxine follow-up in 3 months    Orders Placed This Encounter   Procedures    T4, Free     Standing Status:   Future     Standing Expiration Date:   2/8/2023    TSH with Reflex     Standing Status:   Future     Standing Expiration Date:   2/8/2023    Hemoglobin A1C     Standing Status:   Future     Standing Expiration Date:   2/8/2023    Basic Metabolic Panel, Fasting     Standing Status:   Future     Standing Expiration Date:   2/8/2023    Lipid, Fasting     Standing Status:   Future     Standing Expiration Date:   2/8/2023         Subjective:     Chief Complaint   Patient presents with    Diabetes    Hypothyroidism     Vitals:    02/08/22 0919 02/08/22 0921   BP: (!) 160/82 (!) 155/82   Pulse: 102    SpO2: 95%    Weight: (!) 353 lb (160.1 kg)    Height: 5' 7\" (1.702 m)      Wt Readings from Last 3 Encounters:   02/08/22 (!) 353 lb (160.1 kg)   11/11/21 (!) 356 lb 6.4 oz (161.7 kg)   08/24/21 (!) 348 lb (157.9 kg)     BP Readings from Last 3 Encounters:   02/08/22 (!) 155/82   11/11/21 138/74   08/24/21 135/79     Follow-up on type 1 diabetes hypothyroidism patient using insulin pump labs were reviewed A1c was slightly high pump was downloaded hypothyroidism on replacement thyroid function test otherwise have been stable  History of morbid obesity BMI 55  Patient also on metformin for hypothyroidism patient is on levothyroxine 125 mcg daily    Diabetes  She presents for her follow-up diabetic visit. She has type 1 diabetes mellitus. There are no hypoglycemic complications. Diabetic complications include nephropathy.  Risk factors for coronary artery disease include obesity. Current diabetic treatment includes insulin pump. Her overall blood glucose range is 140-180 mg/dl. (Lab Results       Component                Value               Date                       LABA1C                   7.3 (H)             12/20/2021            Reviewed her 2-week pump download average blood 167±51  78% time in range  85% in auto mode    Basal rate 2 units/h carb ratio was 9 sensitivity was 20  )     Past Medical History:   Diagnosis Date    Arthritis     right knee and ankle    Pilonidal cyst     Thyroid disease     hypothyroid    Type 1 diabetes (HCC)      Past Surgical History:   Procedure Laterality Date    APPENDECTOMY      ARTHROSCOPY / ARTHROTOMY KNEE Right 11/8/2016    RIGHT KNEE ARTHROSCOPY / MEDIAL MENISCUS TEAR / SUPINE  performed by Silva Wagoner MD at [address] [place] [address]     Social History     Socioeconomic History    Marital status:      Spouse name: Not on file    Number of children: Not on file    Years of education: Not on file    Highest education level: Not on file   Occupational History    Not on file   Tobacco Use    Smoking status: Never Smoker    Smokeless tobacco: Never Used   Vaping Use    Vaping Use: Never used   Substance and Sexual Activity    Alcohol use: No    Drug use: No    Sexual activity: Yes   Other Topics Concern    Not on file   Social History Narrative    Not on file     Social Determinants of Health     Financial Resource Strain:     Difficulty of Paying Living Expenses: Not on file   Food Insecurity:     Worried About Running Out of Food in the Last Year: Not on file    Jason of Food in the Last Year: Not on file   Transportation Needs:     Lack of Transportation (Medical): Not on file    Lack of Transportation (Non-Medical):  Not on file   Physical Activity:     Days of Exercise per Week: Not on file    Minutes of Exercise per Session: Not on file   Stress:  Feeling of Stress : Not on file   Social Connections:     Frequency of Communication with Friends and Family: Not on file    Frequency of Social Gatherings with Friends and Family: Not on file    Attends Jainism Services: Not on file    Active Member of Clubs or Organizations: Not on file    Attends Club or Organization Meetings: Not on file    Marital Status: Not on file   Intimate Partner Violence:     Fear of Current or Ex-Partner: Not on file    Emotionally Abused: Not on file    Physically Abused: Not on file    Sexually Abused: Not on file   Housing Stability:     Unable to Pay for Housing in the Last Year: Not on file    Number of Places Lived in the Last Year: Not on file    Unstable Housing in the Last Year: Not on file     Family History   Problem Relation Age of Onset    Arthritis Mother     Heart Disease Father     High Blood Pressure Father     No Known Problems Daughter      Allergies   Allergen Reactions    Shellfish Allergy Anxiety, Hives, Itching, Shortness Of Breath and Swelling       Current Outpatient Medications:     levothyroxine (SYNTHROID) 125 MCG tablet, TAKE 2 TABLETS DAILY, Disp: 180 tablet, Rfl: 3    metFORMIN (GLUCOPHAGE) 500 MG tablet, TAKE 1 TABLET TWICE DAILY  WITH MEALS, Disp: 180 tablet, Rfl: 2    HUMALOG 100 UNIT/ML injection vial, INJECT PER INSULIN PUMP    MAXIMUM DOSE 100 UNITS PER DAY SUBCUTANEOUSLY, Disp: 90 mL, Rfl: 2    OXYGEN, Discontinue Oxygen, Disp: 1 Units, Rfl: 0    lisinopril (PRINIVIL;ZESTRIL) 10 MG tablet, TAKE 1 TABLET DAILY, Disp: 90 tablet, Rfl: 3    QUEtiapine (SEROQUEL) 50 MG tablet, TAKE 1 TABLET EVERY MORNINGAND TAKE 2 TABLETS EVERY   EVENING, Disp: 270 tablet, Rfl: 3    traZODone (DESYREL) 50 MG tablet, TAKE 3 TABLETS AT BEDTIME, Disp: 270 tablet, Rfl: 3    pantoprazole (PROTONIX) 40 MG tablet, TAKE 1 TABLET DAILY, Disp: 90 tablet, Rfl: 3    albuterol sulfate HFA (VENTOLIN HFA) 108 (90 Base) MCG/ACT inhaler, Inhale 2 puffs into the lungs 4 times daily as needed for Wheezing, Disp: 1 Inhaler, Rfl: 0    traMADol (ULTRAM ER) 100 MG extended release tablet, Take 1 tablet by mouth every night as needed for pain, Disp: , Rfl:     albuterol sulfate  (90 Base) MCG/ACT inhaler, INHALE 2 PUFFS EVERY 4 TO 6 HOURS AS NEEDED, Disp: , Rfl: 0    mometasone (ELOCON) 0.1 % cream, Apply topically daily. , Disp: 45 g, Rfl: 2    Calcium Carb-Cholecalciferol (CALCIUM 600 + D) 600-200 MG-UNIT TABS, one a day, Disp: , Rfl:     LYRICA 150 MG capsule, Place 150 mg into the right eye 2 times daily. , Disp: , Rfl: 0    insulin glargine (LANTUS) 100 UNIT/ML injection vial, 40 units at bedtime (Patient taking differently: Indications: Pt to use as backup if insulin pump fails 40 units at bedtime), Disp: 40 mL, Rfl: 3    Insulin Syringe-Needle U-100 ([employer] INSULIN SYRINGE) 31G X 5/16\" 0.5 ML MISC, 1 each by Does not apply route daily, Disp: 300 each, Rfl: 3    INSULIN INFUSION PUMP, by Does not apply route.  , Disp: , Rfl:     ALPRAZolam (XANAX) 0.5 MG tablet, TAKE 1 TABLET BY MOUTH THREE TIMES DAILY AS NEEDED for sleep or anxiety for up to 30 days, Disp: 90 tablet, Rfl: 2  Lab Results   Component Value Date     12/20/2021    K 4.7 12/20/2021     12/20/2021    CO2 24 12/20/2021    BUN 16 12/20/2021    CREATININE 1.37 (H) 12/20/2021    GLUCOSE 151 (H) 12/20/2021    CALCIUM 9.1 12/20/2021    PROT 6.4 04/17/2021    LABALBU 2.9 (L) 04/17/2021    BILITOT 0.4 04/17/2021    ALKPHOS 117 04/17/2021    AST 37 (H) 04/17/2021    ALT 41 (H) 04/17/2021    LABGLOM 40.6 (L) 12/20/2021    GFRAA 49.2 (L) 12/20/2021    GLOB 3.5 04/17/2021     Lab Results   Component Value Date    WBC 7.9 04/19/2021    HGB 12.0 04/19/2021    HCT 38.0 04/19/2021    MCV 74.2 (L) 04/19/2021     04/19/2021     Lab Results   Component Value Date    LABA1C 7.3 (H) 12/20/2021    LABA1C 7.1 (H) 06/28/2021    LABA1C 8.2 (H) 04/13/2021     Lab Results   Component Value Date HDL 63 (H) 01/08/2021    HDL 67 (H) 09/25/2018    HDL 75 (H) 06/10/2014    LDLCALC 92 01/08/2021    LDLCALC 84 09/25/2018    LDLCALC 79 06/10/2014    CHOL 173 01/08/2021    CHOL 164 09/25/2018    CHOL 168 06/10/2014    TRIG 89 01/08/2021    TRIG 67 09/25/2018    TRIG 72 06/10/2014     No results found for: TESTM  Lab Results   Component Value Date    TSH 1.510 12/20/2021    TSH 0.208 (L) 04/12/2021    TSH 0.861 01/08/2021    TSHREFLEX 0.624 06/28/2021    TSHREFLEX 7.560 (H) 08/06/2020    TSHREFLEX 4.580 (H) 11/19/2019    T4FREE 1.64 12/20/2021    T4FREE 1.38 06/28/2021    T4FREE 2.31 (H) 04/12/2021     No results found for: TPOABS    Review of Systems   Endocrine: Negative. Musculoskeletal: Positive for arthralgias and back pain. All other systems reviewed and are negative. Objective:   Physical Exam  Vitals reviewed. Constitutional:       Appearance: Normal appearance. She is obese. HENT:      Head: Normocephalic and atraumatic. Hair is normal.      Right Ear: External ear normal.      Left Ear: External ear normal.      Nose: Nose normal.   Eyes:      General: No scleral icterus. Right eye: No discharge. Left eye: No discharge. Extraocular Movements: Extraocular movements intact. Conjunctiva/sclera: Conjunctivae normal.   Neck:      Trachea: Trachea normal.   Cardiovascular:      Rate and Rhythm: Normal rate. Pulmonary:      Effort: Pulmonary effort is normal.   Musculoskeletal:         General: Normal range of motion. Cervical back: Normal range of motion and neck supple. Neurological:      General: No focal deficit present. Mental Status: She is alert and oriented to person, place, and time.    Psychiatric:         Mood and Affect: Mood normal.         Behavior: Behavior normal.

## 2022-06-17 DIAGNOSIS — E10.9 TYPE 1 DIABETES MELLITUS WITHOUT COMPLICATION (HCC): ICD-10-CM

## 2022-06-17 DIAGNOSIS — E03.9 ACQUIRED HYPOTHYROIDISM: ICD-10-CM

## 2022-06-17 LAB
ANION GAP SERPL CALCULATED.3IONS-SCNC: 16 MEQ/L (ref 9–15)
BUN BLDV-MCNC: 18 MG/DL (ref 6–20)
CALCIUM SERPL-MCNC: 8.9 MG/DL (ref 8.5–9.9)
CHLORIDE BLD-SCNC: 101 MEQ/L (ref 95–107)
CHOLESTEROL, FASTING: 166 MG/DL (ref 0–199)
CO2: 21 MEQ/L (ref 20–31)
CREAT SERPL-MCNC: 1.47 MG/DL (ref 0.5–0.9)
GFR AFRICAN AMERICAN: 45.2
GFR NON-AFRICAN AMERICAN: 37.4
GLUCOSE FASTING: 91 MG/DL (ref 70–99)
HBA1C MFR BLD: 7.4 % (ref 4.8–5.9)
HDLC SERPL-MCNC: 53 MG/DL (ref 40–59)
LDL CHOLESTEROL CALCULATED: 87 MG/DL (ref 0–129)
POTASSIUM SERPL-SCNC: 4.4 MEQ/L (ref 3.4–4.9)
SODIUM BLD-SCNC: 138 MEQ/L (ref 135–144)
T4 FREE: 1.47 NG/DL (ref 0.84–1.68)
TRIGLYCERIDE, FASTING: 128 MG/DL (ref 0–150)
TSH REFLEX: 2.29 UIU/ML (ref 0.44–3.86)

## 2022-06-20 ENCOUNTER — COMMUNITY OUTREACH (OUTPATIENT)
Dept: FAMILY MEDICINE CLINIC | Age: 52
End: 2022-06-20

## 2022-06-21 ENCOUNTER — OFFICE VISIT (OUTPATIENT)
Dept: ENDOCRINOLOGY | Age: 52
End: 2022-06-21
Payer: COMMERCIAL

## 2022-06-21 VITALS
BODY MASS INDEX: 45.99 KG/M2 | DIASTOLIC BLOOD PRESSURE: 70 MMHG | WEIGHT: 293 LBS | OXYGEN SATURATION: 93 % | HEIGHT: 67 IN | SYSTOLIC BLOOD PRESSURE: 107 MMHG | HEART RATE: 101 BPM

## 2022-06-21 DIAGNOSIS — E03.9 ACQUIRED HYPOTHYROIDISM: ICD-10-CM

## 2022-06-21 DIAGNOSIS — E66.01 MORBID OBESITY (HCC): ICD-10-CM

## 2022-06-21 DIAGNOSIS — E10.9 TYPE 1 DIABETES MELLITUS WITHOUT COMPLICATION (HCC): Primary | ICD-10-CM

## 2022-06-21 DIAGNOSIS — N18.30 STAGE 3 CHRONIC KIDNEY DISEASE, UNSPECIFIED WHETHER STAGE 3A OR 3B CKD (HCC): ICD-10-CM

## 2022-06-21 PROCEDURE — 3051F HG A1C>EQUAL 7.0%<8.0%: CPT | Performed by: INTERNAL MEDICINE

## 2022-06-21 PROCEDURE — 99214 OFFICE O/P EST MOD 30 MIN: CPT | Performed by: INTERNAL MEDICINE

## 2022-06-21 RX ORDER — TOPIRAMATE 25 MG/1
TABLET ORAL
COMMUNITY
Start: 2022-05-09

## 2022-06-21 RX ORDER — DICLOFENAC SODIUM 75 MG/1
TABLET, DELAYED RELEASE ORAL
COMMUNITY
Start: 2022-06-08

## 2022-06-21 ASSESSMENT — ENCOUNTER SYMPTOMS: EYES NEGATIVE: 1

## 2022-06-21 NOTE — PROGRESS NOTES
6/21/2022    Assessment:       Diagnosis Orders   1. Type 1 diabetes mellitus without complication (HCC)  Hemoglobin Q8A    Basic Metabolic Panel, Fasting   2. Acquired hypothyroidism  T4, Free    TSH with Reflex   3. Morbid obesity (HCC)     4. Stage 3 chronic kidney disease, unspecified whether stage 3a or 3b CKD (HCC)           PLAN:       Continue current insulin regimen  Discontinue metformin  Continue current dose of Synthroid  Discontinue Topamax  Increase fluids  More than 50% of 30 minutes spent patient education counseling  Diabetes education provided today:    Diabetic nephropathy: Kidney function, microalbumin as a sign of diabetic nephropathy. Stages of kidney disease. Insulin pumps, how they work and how they affect blood sugar levels. Continuous Glucose monitor. How it works and checks blood sugars every 5 min. for 4 days during our tests.     Orders Placed This Encounter   Procedures    T4, Free     Standing Status:   Future     Standing Expiration Date:   6/21/2023    TSH with Reflex     Standing Status:   Future     Standing Expiration Date:   6/21/2023    Hemoglobin A1C     Standing Status:   Future     Standing Expiration Date:   6/21/2023    Basic Metabolic Panel, Fasting     Standing Status:   Future     Standing Expiration Date:   6/21/2023       Subjective:     Chief Complaint   Patient presents with    Diabetes    Hypothyroidism     Vitals:    06/21/22 0918   BP: 107/70   Pulse: (!) 101   SpO2: 93%   Weight: (!) 365 lb (165.6 kg)   Height: 5' 7\" (1.702 m)     Wt Readings from Last 3 Encounters:   06/21/22 (!) 365 lb (165.6 kg)   02/08/22 (!) 353 lb (160.1 kg)   11/11/21 (!) 356 lb 6.4 oz (161.7 kg)     BP Readings from Last 3 Encounters:   06/21/22 107/70   02/08/22 (!) 155/82   11/11/21 138/74     Follow-up with type 1 diabetes chronic kidney disease obesity hypothyroidism lab review patient currently on Medtronic pump with sensor reviewed pump download  Hypothyroidism levothyroxine 125 mcg daily  Labs showed increasing creatinine recently also started on Topamax complains of drowsiness    Diabetes  She presents for her follow-up diabetic visit. She has type 1 diabetes mellitus. Pertinent negatives for diabetes include no polydipsia and no polyuria. There are no hypoglycemic complications. Symptoms are stable. Diabetic complications include nephropathy. Risk factors for coronary artery disease include obesity. Current diabetic treatment includes insulin pump. Meal planning includes carbohydrate counting. Her overall blood glucose range is 140-180 mg/dl. (Lab Results       Component                Value               Date                       LABA1C                   7.4 (H)             06/17/2022            2-week pump download average blood sugar 180±78  87% in auto mode  30% manual mode  76% in range  1% low 1% very low    Pump setting basal rate 2 units/h carb ratio was 9 sensitivity was 20)   Other  This is a chronic (Hypothyroidism) problem. The current episode started more than 1 year ago. The problem has been waxing and waning. Treatments tried: Levothyroxine. The treatment provided moderate relief.      Past Medical History:   Diagnosis Date    Arthritis     right knee and ankle    Pilonidal cyst     Thyroid disease     hypothyroid    Type 1 diabetes (HCC)      Past Surgical History:   Procedure Laterality Date    APPENDECTOMY      ARTHROSCOPY / ARTHROTOMY KNEE Right 11/8/2016    RIGHT KNEE ARTHROSCOPY / MEDIAL MENISCUS TEAR / SUPINE  performed by Bettie Fam MD at [address] [place] [address]     Social History     Socioeconomic History    Marital status:      Spouse name: Not on file    Number of children: Not on file    Years of education: Not on file    Highest education level: Not on file   Occupational History    Not on file   Tobacco Use    Smoking status: Never Smoker    Smokeless tobacco: Never Used   Vaping Use    Vaping Use: Never used Substance and Sexual Activity    Alcohol use: No    Drug use: No    Sexual activity: Yes   Other Topics Concern    Not on file   Social History Narrative    Not on file     Social Determinants of Health     Financial Resource Strain:     Difficulty of Paying Living Expenses: Not on file   Food Insecurity:     Worried About Running Out of Food in the Last Year: Not on file    Jason of Food in the Last Year: Not on file   Transportation Needs:     Lack of Transportation (Medical): Not on file    Lack of Transportation (Non-Medical):  Not on file   Physical Activity:     Days of Exercise per Week: Not on file    Minutes of Exercise per Session: Not on file   Stress:     Feeling of Stress : Not on file   Social Connections:     Frequency of Communication with Friends and Family: Not on file    Frequency of Social Gatherings with Friends and Family: Not on file    Attends Islam Services: Not on file    Active Member of [address] [place] [address] [employer] or Organizations: Not on file    Attends Club or Organization Meetings: Not on file    Marital Status: Not on file   Intimate Partner Violence:     Fear of Current or Ex-Partner: Not on file    Emotionally Abused: Not on file    Physically Abused: Not on file    Sexually Abused: Not on file   Housing Stability:     Unable to Pay for Housing in the Last Year: Not on file    Number of [place] in the Last Year: Not on file    Unstable Housing in the Last Year: Not on file     Family History   Problem Relation Age of Onset    Arthritis Mother     Heart Disease Father     High Blood Pressure Father     No Known Problems Daughter      Allergies   Allergen Reactions    Shellfish Allergy Anxiety, Hives, Itching, Shortness Of Breath and Swelling    Poison Ivy Extract        Current Outpatient Medications:     topiramate (TOPAMAX) 25 MG tablet, TAKE 1 TABLET BY MOUTH THREE TIMES DAILY 1 (ONE) hour before meals with a big glass OF water, Disp: , Rfl:     diclofenac (VOLTAREN) 75 MG EC tablet, TAKE 1 TABLET BY MOUTH TWICE DAILY AFTER MEALS, Disp: , Rfl:     levothyroxine (SYNTHROID) 125 MCG tablet, TAKE 2 TABLETS DAILY, Disp: 180 tablet, Rfl: 3    metFORMIN (GLUCOPHAGE) 500 MG tablet, TAKE 1 TABLET TWICE DAILY  WITH MEALS, Disp: 180 tablet, Rfl: 2    HUMALOG 100 UNIT/ML injection vial, INJECT PER INSULIN PUMP    MAXIMUM DOSE 100 UNITS PER DAY SUBCUTANEOUSLY, Disp: 90 mL, Rfl: 2    OXYGEN, Discontinue Oxygen, Disp: 1 Units, Rfl: 0    lisinopril (PRINIVIL;ZESTRIL) 10 MG tablet, TAKE 1 TABLET DAILY, Disp: 90 tablet, Rfl: 3    QUEtiapine (SEROQUEL) 50 MG tablet, TAKE 1 TABLET EVERY MORNINGAND TAKE 2 TABLETS EVERY   EVENING, Disp: 270 tablet, Rfl: 3    traZODone (DESYREL) 50 MG tablet, TAKE 3 TABLETS AT BEDTIME, Disp: 270 tablet, Rfl: 3    pantoprazole (PROTONIX) 40 MG tablet, TAKE 1 TABLET DAILY, Disp: 90 tablet, Rfl: 3    albuterol sulfate HFA (VENTOLIN HFA) 108 (90 Base) MCG/ACT inhaler, Inhale 2 puffs into the lungs 4 times daily as needed for Wheezing, Disp: 1 Inhaler, Rfl: 0    traMADol (ULTRAM ER) 100 MG extended release tablet, Take 1 tablet by mouth every night as needed for pain, Disp: , Rfl:     albuterol sulfate  (90 Base) MCG/ACT inhaler, INHALE 2 PUFFS EVERY 4 TO 6 HOURS AS NEEDED, Disp: , Rfl: 0    mometasone (ELOCON) 0.1 % cream, Apply topically daily. , Disp: 45 g, Rfl: 2    Calcium Carb-Cholecalciferol (CALCIUM 600 + D) 600-200 MG-UNIT TABS, one a day, Disp: , Rfl:     LYRICA 150 MG capsule, Place 150 mg into the right eye 2 times daily. , Disp: , Rfl: 0    insulin glargine (LANTUS) 100 UNIT/ML injection vial, 40 units at bedtime (Patient taking differently: Indications: Pt to use as backup if insulin pump fails 40 units at bedtime), Disp: 40 mL, Rfl: 3    Insulin Syringe-Needle U-100 ([employer] INSULIN SYRINGE) 31G X 5/16\" 0.5 ML MISC, 1 each by Does not apply route daily, Disp: 300 each, Rfl: 3    ALPRAZolam (XANAX) 0.5 MG tablet, TAKE 1 TABLET BY MOUTH THREE TIMES DAILY AS NEEDED for sleep or anxiety for up to 30 days, Disp: 90 tablet, Rfl: 2  Lab Results   Component Value Date     06/17/2022    K 4.4 06/17/2022     06/17/2022    CO2 21 06/17/2022    BUN 18 06/17/2022    CREATININE 1.47 (H) 06/17/2022    GLUCOSE 151 (H) 12/20/2021    CALCIUM 8.9 06/17/2022    PROT 6.4 04/17/2021    LABALBU 2.9 (L) 04/17/2021    BILITOT 0.4 04/17/2021    ALKPHOS 117 04/17/2021    AST 37 (H) 04/17/2021    ALT 41 (H) 04/17/2021    LABGLOM 37.4 (L) 06/17/2022    GFRAA 45.2 (L) 06/17/2022    GLOB 3.5 04/17/2021     Lab Results   Component Value Date    WBC 7.9 04/19/2021    HGB 12.0 04/19/2021    HCT 38.0 04/19/2021    MCV 74.2 (L) 04/19/2021     04/19/2021     Lab Results   Component Value Date    LABA1C 7.4 (H) 06/17/2022    LABA1C 7.3 (H) 12/20/2021    LABA1C 7.1 (H) 06/28/2021     Lab Results   Component Value Date    CHOLFAST 166 06/17/2022    TRIGLYCFAST 128 06/17/2022    HDL 53 06/17/2022    HDL 63 (H) 01/08/2021    HDL 67 (H) 09/25/2018    LDLCALC 87 06/17/2022    LDLCALC 92 01/08/2021    LDLCALC 84 09/25/2018    CHOL 173 01/08/2021    CHOL 164 09/25/2018    CHOL 168 06/10/2014    TRIG 89 01/08/2021    TRIG 67 09/25/2018    TRIG 72 06/10/2014     No results found for: TESTM  Lab Results   Component Value Date    TSH 1.510 12/20/2021    TSH 0.208 (L) 04/12/2021    TSH 0.861 01/08/2021    TSHREFLEX 2.290 06/17/2022    TSHREFLEX 0.624 06/28/2021    TSHREFLEX 7.560 (H) 08/06/2020    T4FREE 1.47 06/17/2022    T4FREE 1.64 12/20/2021    T4FREE 1.38 06/28/2021     No results found for: TPOABS    Review of Systems   Eyes: Negative. Cardiovascular: Negative. Endocrine: Negative for polydipsia and polyuria. Neurological: Negative. Psychiatric/Behavioral: Positive for dysphoric mood and sleep disturbance. All other systems reviewed and are negative. Objective:   Physical Exam  Vitals reviewed.    Constitutional:       General: She is not in acute distress. Appearance: Normal appearance. She is obese. HENT:      Head: Normocephalic and atraumatic. Right Ear: External ear normal.      Left Ear: External ear normal.      Nose: Nose normal.   Eyes:      General: No scleral icterus. Right eye: No discharge. Left eye: No discharge. Extraocular Movements: Extraocular movements intact. Conjunctiva/sclera: Conjunctivae normal.   Cardiovascular:      Rate and Rhythm: Normal rate. Pulmonary:      Effort: Pulmonary effort is normal.   Musculoskeletal:         General: Normal range of motion. Cervical back: Normal range of motion and neck supple. Neurological:      General: No focal deficit present. Mental Status: She is alert and oriented to person, place, and time.    Psychiatric:         Mood and Affect: Mood normal.         Behavior: Behavior normal.

## 2022-06-23 ENCOUNTER — OFFICE VISIT (OUTPATIENT)
Dept: FAMILY MEDICINE CLINIC | Age: 52
End: 2022-06-23
Payer: COMMERCIAL

## 2022-06-23 VITALS
DIASTOLIC BLOOD PRESSURE: 78 MMHG | TEMPERATURE: 97.7 F | RESPIRATION RATE: 22 BRPM | SYSTOLIC BLOOD PRESSURE: 132 MMHG | BODY MASS INDEX: 58.91 KG/M2 | HEART RATE: 98 BPM | HEIGHT: 66 IN | OXYGEN SATURATION: 97 %

## 2022-06-23 DIAGNOSIS — F41.0 PANIC ATTACKS: Primary | ICD-10-CM

## 2022-06-23 DIAGNOSIS — F41.0 PANIC ATTACKS: ICD-10-CM

## 2022-06-23 PROCEDURE — 99213 OFFICE O/P EST LOW 20 MIN: CPT | Performed by: FAMILY MEDICINE

## 2022-06-23 RX ORDER — ALPRAZOLAM 0.5 MG/1
TABLET ORAL
Qty: 90 TABLET | Refills: 2 | Status: SHIPPED | OUTPATIENT
Start: 2022-06-23 | End: 2022-09-23

## 2022-06-23 SDOH — ECONOMIC STABILITY: FOOD INSECURITY: WITHIN THE PAST 12 MONTHS, YOU WORRIED THAT YOUR FOOD WOULD RUN OUT BEFORE YOU GOT MONEY TO BUY MORE.: NEVER TRUE

## 2022-06-23 SDOH — ECONOMIC STABILITY: FOOD INSECURITY: WITHIN THE PAST 12 MONTHS, THE FOOD YOU BOUGHT JUST DIDN'T LAST AND YOU DIDN'T HAVE MONEY TO GET MORE.: NEVER TRUE

## 2022-06-23 ASSESSMENT — ENCOUNTER SYMPTOMS
CHEST TIGHTNESS: 0
GASTROINTESTINAL NEGATIVE: 1
EYES NEGATIVE: 1
COUGH: 0
RESPIRATORY NEGATIVE: 1
RHINORRHEA: 0

## 2022-06-23 ASSESSMENT — PATIENT HEALTH QUESTIONNAIRE - PHQ9
1. LITTLE INTEREST OR PLEASURE IN DOING THINGS: 0
SUM OF ALL RESPONSES TO PHQ9 QUESTIONS 1 & 2: 0
SUM OF ALL RESPONSES TO PHQ QUESTIONS 1-9: 0
2. FEELING DOWN, DEPRESSED OR HOPELESS: 0
SUM OF ALL RESPONSES TO PHQ QUESTIONS 1-9: 0

## 2022-06-23 ASSESSMENT — SOCIAL DETERMINANTS OF HEALTH (SDOH): HOW HARD IS IT FOR YOU TO PAY FOR THE VERY BASICS LIKE FOOD, HOUSING, MEDICAL CARE, AND HEATING?: NOT HARD AT ALL

## 2022-06-23 NOTE — PROGRESS NOTES
Patient is seen in follow up for   Chief Complaint   Patient presents with    3 Month Follow-Up    Anxiety     xanax     HPIhere for follow up on anxiety doing well.     Past Medical History:   Diagnosis Date    Arthritis     right knee and ankle    Pilonidal cyst     Thyroid disease     hypothyroid    Type 1 diabetes [facility])      Patient Active Problem List    Diagnosis Date Noted    Bronchitis 08/17/2021    Dyspnea 08/17/2021    Hypoxia 05/21/2021    COVID-19 virus infection     Respiratory failure ([facility] 04/12/2021    Brow ptosis 01/22/2016    Long-term insulin use ([facility] 01/22/2016    Nuclear sclerotic cataract of both eyes 01/22/2016    Background retinopathy due to secondary diabetes ([facility] 10/16/2014    Age related cataract 10/16/2014    Type 1 diabetes mellitus ([facility] 07/02/2014    Obesity 08/18/2013    Panic attacks 11/29/2012    Pilonidal cyst     Vitamin D deficiency 03/28/2011    Chronic lymphocytic thyroiditis 11/05/2002    Acquired hypothyroidism 11/05/2002     Past Surgical History:   Procedure Laterality Date    APPENDECTOMY      ARTHROSCOPY / ARTHROTOMY KNEE Right 11/8/2016    RIGHT KNEE ARTHROSCOPY / MEDIAL MENISCUS TEAR / SUPINE  performed by Alisa Beard MD at [address] [place] [address]     Family History   Problem Relation Age of Onset    Arthritis Mother     Heart Disease Father     High Blood Pressure Father     No Known Problems Daughter      Social History     Socioeconomic History    Marital status:      Spouse name: None    Number of children: None    Years of education: None    Highest education level: None   Occupational History    None   Tobacco Use    Smoking status: Never Smoker    Smokeless tobacco: Never Used   Vaping Use    Vaping Use: Never used   Substance and Sexual Activity    Alcohol use: No    Drug use: No    Sexual activity: Yes   Other Topics Concern    None   Social History Narrative    None     Social Determinants of Health     Financial Resource Strain: Low Risk     Difficulty of Paying Living Expenses: Not hard at all   Food Insecurity: No Food Insecurity    Worried About Running Out of Food in the Last Year: Never true    Jason of Food in the Last Year: Never true   Transportation Needs:     Lack of Transportation (Medical): Not on file    Lack of Transportation (Non-Medical):  Not on file   Physical Activity:     Days of Exercise per Week: Not on file    Minutes of Exercise per Session: Not on file   Stress:     Feeling of Stress : Not on file   Social Connections:     Frequency of Communication with Friends and Family: Not on file    Frequency of Social Gatherings with Friends and Family: Not on file    Attends Yarsani Services: Not on file    Active Member of Clubs or Organizations: Not on file    Attends Club or Organization Meetings: Not on file    Marital Status: Not on file   Intimate Partner Violence:     Fear of Current or Ex-Partner: Not on file    Emotionally Abused: Not on file    Physically Abused: Not on file    Sexually Abused: Not on file   Housing Stability:     Unable to Pay for Housing in the Last Year: Not on file    Number of Places Lived in the Last Year: Not on file    Unstable Housing in the Last Year: Not on file     Current Outpatient Medications   Medication Sig Dispense Refill    ALPRAZolam (XANAX) 0.5 MG tablet TAKE 1 TABLET BY MOUTH THREE TIMES DAILY AS NEEDED for sleep or anxiety for up to 30 days 90 tablet 2    topiramate (TOPAMAX) 25 MG tablet TAKE 1 TABLET BY MOUTH THREE TIMES DAILY 1 (ONE) hour before meals with a big glass OF water      diclofenac (VOLTAREN) 75 MG EC tablet TAKE 1 TABLET BY MOUTH TWICE DAILY AFTER MEALS      levothyroxine (SYNTHROID) 125 MCG tablet TAKE 2 TABLETS DAILY 180 tablet 3    HUMALOG 100 UNIT/ML injection vial INJECT PER INSULIN PUMP    MAXIMUM DOSE 100 UNITS PER DAY SUBCUTANEOUSLY 90 mL 2    OXYGEN Discontinue Oxygen 1 Units 0    lisinopril (PRINIVIL;ZESTRIL) 10 MG tablet TAKE 1 TABLET DAILY 90 tablet 3    QUEtiapine (SEROQUEL) 50 MG tablet TAKE 1 TABLET EVERY MORNINGAND TAKE 2 TABLETS EVERY   EVENING 270 tablet 3    traZODone (DESYREL) 50 MG tablet TAKE 3 TABLETS AT BEDTIME 270 tablet 3    pantoprazole (PROTONIX) 40 MG tablet TAKE 1 TABLET DAILY 90 tablet 3    albuterol sulfate HFA (VENTOLIN HFA) 108 (90 Base) MCG/ACT inhaler Inhale 2 puffs into the lungs 4 times daily as needed for Wheezing 1 Inhaler 0    traMADol (ULTRAM ER) 100 MG extended release tablet Take 1 tablet by mouth every night as needed for pain      albuterol sulfate  (90 Base) MCG/ACT inhaler INHALE 2 PUFFS EVERY 4 TO 6 HOURS AS NEEDED  0    mometasone (ELOCON) 0.1 % cream Apply topically daily. 45 g 2    Calcium Carb-Cholecalciferol (CALCIUM 600 + D) 600-200 MG-UNIT TABS one a day      LYRICA 150 MG capsule Place 150 mg into the right eye 2 times daily. 0    insulin glargine (LANTUS) 100 UNIT/ML injection vial 40 units at bedtime (Patient taking differently: Indications: Pt to use as backup if insulin pump fails 40 units at bedtime) 40 mL 3    Insulin Syringe-Needle U-100 ([employer] INSULIN SYRINGE) 31G X 5/16\" 0.5 ML MISC 1 each by Does not apply route daily 300 each 3     No current facility-administered medications for this visit.      Current Outpatient Medications on File Prior to Visit   Medication Sig Dispense Refill    topiramate (TOPAMAX) 25 MG tablet TAKE 1 TABLET BY MOUTH THREE TIMES DAILY 1 (ONE) hour before meals with a big glass OF water      diclofenac (VOLTAREN) 75 MG EC tablet TAKE 1 TABLET BY MOUTH TWICE DAILY AFTER MEALS      levothyroxine (SYNTHROID) 125 MCG tablet TAKE 2 TABLETS DAILY 180 tablet 3    HUMALOG 100 UNIT/ML injection vial INJECT PER INSULIN PUMP    MAXIMUM DOSE 100 UNITS PER DAY SUBCUTANEOUSLY 90 mL 2    OXYGEN Discontinue Oxygen 1 Units 0    lisinopril (PRINIVIL;ZESTRIL) 10 MG tablet TAKE 1 TABLET DAILY 90 tablet 3  QUEtiapine (SEROQUEL) 50 MG tablet TAKE 1 TABLET EVERY MORNINGAND TAKE 2 TABLETS EVERY   EVENING 270 tablet 3    traZODone (DESYREL) 50 MG tablet TAKE 3 TABLETS AT BEDTIME 270 tablet 3    pantoprazole (PROTONIX) 40 MG tablet TAKE 1 TABLET DAILY 90 tablet 3    albuterol sulfate HFA (VENTOLIN HFA) 108 (90 Base) MCG/ACT inhaler Inhale 2 puffs into the lungs 4 times daily as needed for Wheezing 1 Inhaler 0    traMADol (ULTRAM ER) 100 MG extended release tablet Take 1 tablet by mouth every night as needed for pain      albuterol sulfate  (90 Base) MCG/ACT inhaler INHALE 2 PUFFS EVERY 4 TO 6 HOURS AS NEEDED  0    mometasone (ELOCON) 0.1 % cream Apply topically daily. 45 g 2    Calcium Carb-Cholecalciferol (CALCIUM 600 + D) 600-200 MG-UNIT TABS one a day      LYRICA 150 MG capsule Place 150 mg into the right eye 2 times daily. 0    insulin glargine (LANTUS) 100 UNIT/ML injection vial 40 units at bedtime (Patient taking differently: Indications: Pt to use as backup if insulin pump fails 40 units at bedtime) 40 mL 3    Insulin Syringe-Needle U-100 ([employer] INSULIN SYRINGE) 31G X 5/16\" 0.5 ML MISC 1 each by Does not apply route daily 300 each 3     No current facility-administered medications on file prior to visit.      Allergies   Allergen Reactions    Shellfish Allergy Anxiety, Hives, Itching, Shortness Of Breath and Swelling    Poison Ivy Extract      Health Maintenance   Topic Date Due    DTaP/Tdap/Td vaccine (1 - Tdap) Never done    Cervical cancer screen  Never done    Hepatitis B vaccine (3 of 3 - Risk 3-dose series) 06/28/2011    Colorectal Cancer Screen  Never done    Breast cancer screen  11/01/2020    Shingles vaccine (1 of 2) Never done    COVID-19 Vaccine (3 - Booster for Ruelas Peter series) 11/10/2021    Diabetic retinal exam  04/05/2022    Diabetic microalbuminuria test  06/28/2022    Diabetic foot exam  07/02/2022    Flu vaccine (Season Ended) 11/11/2022 (Originally 9/1/2022)   [facility] HIV screen  11/21/2028 (Originally [birth year]/1/1985)    Depression Screen  08/17/2022    Pneumococcal 0-64 years Vaccine (2 - PCV) 11/11/2022    A1C test (Diabetic or Prediabetic)  06/17/2023    Lipids  06/17/2023    Hepatitis C screen  Completed    Hepatitis A vaccine  Aged Out    Hib vaccine  Aged Out    Meningococcal (ACWY) vaccine  Aged Out       Review of Systems     Review of Systems   Constitutional: Negative for activity change, appetite change, fatigue and fever. HENT: Negative for congestion and rhinorrhea. Eyes: Negative. Respiratory: Negative. Negative for cough and chest tightness. Cardiovascular: Negative. Gastrointestinal: Negative. Endocrine: Negative. Genitourinary: Negative. Musculoskeletal: Negative. Skin: Negative. Neurological: Negative for dizziness, light-headedness and numbness. Hematological: Negative. Psychiatric/Behavioral: Negative. Physical Exam  Vitals:    06/23/22 0906   BP: 132/78   Pulse: 98   Resp: 22   Temp: 97.7 °F (36.5 °C)   SpO2: 97%   Height: 5' 6\" (1.676 m)       Physical Exam  Constitutional:       Appearance: She is well-developed. HENT:      Right Ear: External ear normal.      Left Ear: External ear normal.   Eyes:      Pupils: Pupils are equal, round, and reactive to light. Neck:      Thyroid: No thyromegaly. Cardiovascular:      Rate and Rhythm: Normal rate and regular rhythm. Heart sounds: Normal heart sounds. No murmur heard. No friction rub. No gallop. Pulmonary:      Effort: Pulmonary effort is normal. No respiratory distress. Breath sounds: No wheezing or rales. Chest:      Chest wall: No tenderness. Abdominal:      General: Bowel sounds are normal. There is no distension. Palpations: Abdomen is soft. There is no mass. Tenderness: There is no abdominal tenderness. There is no guarding or rebound. Musculoskeletal:         General: Normal range of motion.       Cervical back: Normal range of motion and neck supple. Lymphadenopathy:      Cervical: No cervical adenopathy. Skin:     General: Skin is warm and dry. Neurological:      Mental Status: She is alert and oriented to person, place, and time. Cranial Nerves: No cranial nerve deficit. Coordination: Coordination normal.         Assessment   Diagnosis Orders   1. Panic attacks  Pain Management Drug Screen    ALPRAZolam (XANAX) 0.5 MG tablet     Problem List     Panic attacks - Primary    Relevant Medications    traZODone (DESYREL) 50 MG tablet    ALPRAZolam (XANAX) 0.5 MG tablet    Other Relevant Orders    Pain Management Drug Screen          Plan  Orders Placed This Encounter   Procedures    Pain Management Drug Screen     Standing Status:   Future     Standing Expiration Date:   6/22/2023     Orders Placed This Encounter   Medications    ALPRAZolam (XANAX) 0.5 MG tablet     Sig: TAKE 1 TABLET BY MOUTH THREE TIMES DAILY AS NEEDED for sleep or anxiety for up to 30 days     Dispense:  90 tablet     Refill:  2     No follow-ups on file.   Maged Bojorquez MD

## 2022-06-25 LAB
6-ACETYLMORPHINE: NOT DETECTED
7-AMINOCLONAZEPAM: NOT DETECTED
ALPHA-OH-ALPRAZOLAM: PRESENT
ALPHA-OH-MIDAZOLAM, URINE: NOT DETECTED
ALPRAZOLAM: NOT DETECTED
AMPHETAMINE: NOT DETECTED
BARBITURATES: NOT DETECTED
BENZOYLECGONINE: NOT DETECTED
BUPRENORPHINE: NOT DETECTED
CARISOPRODOL: NOT DETECTED
CLONAZEPAM: NOT DETECTED
CODEINE: NOT DETECTED
CREATININE URINE: 154.2 MG/DL (ref 20–400)
DIAZEPAM: NOT DETECTED
EER PAIN MGT DRUG PANEL, HIGH RES/EMIT U: NORMAL
ETHYL GLUCURONIDE: NOT DETECTED
FENTANYL: NOT DETECTED
GABAPENTIN: NOT DETECTED
HYDROCODONE: NOT DETECTED
HYDROMORPHONE: NOT DETECTED
LORAZEPAM: NOT DETECTED
MARIJUANA METABOLITE: NOT DETECTED
MDA: NOT DETECTED
MDEA: NOT DETECTED
MDMA URINE: NOT DETECTED
MEPERIDINE: NOT DETECTED
METHADONE: NOT DETECTED
METHAMPHETAMINE: NOT DETECTED
METHYLPHENIDATE: NOT DETECTED
MIDAZOLAM: NOT DETECTED
MORPHINE: NOT DETECTED
NALOXONE: NOT DETECTED
NORBUPRENORPHINE, FREE: NOT DETECTED
NORDIAZEPAM: NOT DETECTED
NORFENTANYL: NOT DETECTED
NORHYDROCODONE, URINE: NOT DETECTED
NOROXYCODONE: NOT DETECTED
NOROXYMORPHONE, URINE: NOT DETECTED
OXAZEPAM: NOT DETECTED
OXYCODONE: NOT DETECTED
OXYMORPHONE: NOT DETECTED
PAIN MANAGEMENT DRUG PANEL: NORMAL
PCP: NOT DETECTED
PHENTERMINE: NOT DETECTED
PREGABALIN: PRESENT
TAPENTADOL, URINE: NOT DETECTED
TAPENTADOL-O-SULFATE, URINE: NOT DETECTED
TEMAZEPAM: NOT DETECTED
TRAMADOL: PRESENT
ZOLPIDEM: NOT DETECTED

## 2022-08-03 NOTE — TELEPHONE ENCOUNTER
Rx requested:  Requested Prescriptions     Pending Prescriptions Disp Refills    QUEtiapine (SEROQUEL) 50 MG tablet [Pharmacy Med Name: QUETIAPINE TAB 50MG] 270 tablet 3     Sig: TAKE 1 TABLET EVERY MORNINGAND TAKE 2 TABLETS EVERY   EVENING    lisinopril (PRINIVIL;ZESTRIL) 10 MG tablet [Pharmacy Med Name: LISINOPRIL TAB 10MG] 90 tablet 3     Sig: TAKE 1 TABLET DAILY    pantoprazole (PROTONIX) 40 MG tablet [Pharmacy Med Name: PANTOPRAZOLE TAB 40MG DR] 90 tablet 3     Sig: TAKE 1 TABLET DAILY    traZODone (DESYREL) 50 MG tablet [Pharmacy Med Name: TRAZODONE TAB 50MG] 270 tablet 3     Sig: TAKE 3 TABLETS AT BEDTIME         Last Office Visit:   6/23/2022      Next Visit Date:  Future Appointments   Date Time Provider Sandra Matthew   9/20/2022  9:00 AM [facility], [facility]   10/4/2022  9:00 AM Jaylene Figueroa  [place], [place]

## 2022-08-04 RX ORDER — QUETIAPINE FUMARATE 50 MG/1
TABLET, FILM COATED ORAL
Qty: 270 TABLET | Refills: 3 | Status: SHIPPED | OUTPATIENT
Start: 2022-08-04

## 2022-08-04 RX ORDER — PANTOPRAZOLE SODIUM 40 MG/1
TABLET, DELAYED RELEASE ORAL
Qty: 90 TABLET | Refills: 3 | Status: SHIPPED | OUTPATIENT
Start: 2022-08-04

## 2022-08-04 RX ORDER — LISINOPRIL 10 MG/1
TABLET ORAL
Qty: 90 TABLET | Refills: 3 | Status: SHIPPED | OUTPATIENT
Start: 2022-08-04

## 2022-08-04 RX ORDER — TRAZODONE HYDROCHLORIDE 50 MG/1
TABLET ORAL
Qty: 270 TABLET | Refills: 3 | Status: SHIPPED | OUTPATIENT
Start: 2022-08-04

## 2022-08-04 RX ORDER — INSULIN LISPRO 100 [IU]/ML
INJECTION, SOLUTION INTRAVENOUS; SUBCUTANEOUS
Qty: 90 ML | Refills: 3 | Status: SHIPPED | OUTPATIENT
Start: 2022-08-04

## 2022-08-09 ENCOUNTER — COMMUNITY OUTREACH (OUTPATIENT)
Dept: FAMILY MEDICINE CLINIC | Age: 52
End: 2022-08-09

## 2022-08-09 NOTE — PROGRESS NOTES
Patient's HM shows they are overdue for Colonoscopy and Mammogram.   [employer] and  files searched  without success.

## 2022-10-20 DIAGNOSIS — E10.9 TYPE 1 DIABETES MELLITUS WITHOUT COMPLICATION (HCC): ICD-10-CM

## 2022-10-20 DIAGNOSIS — E03.9 ACQUIRED HYPOTHYROIDISM: ICD-10-CM

## 2022-10-20 LAB
ANION GAP SERPL CALCULATED.3IONS-SCNC: 12 MEQ/L (ref 9–15)
BUN BLDV-MCNC: 22 MG/DL (ref 6–20)
CALCIUM SERPL-MCNC: 8.9 MG/DL (ref 8.5–9.9)
CHLORIDE BLD-SCNC: 106 MEQ/L (ref 95–107)
CO2: 23 MEQ/L (ref 20–31)
CREAT SERPL-MCNC: 1.59 MG/DL (ref 0.5–0.9)
GFR SERPL CREATININE-BSD FRML MDRD: 38.9 ML/MIN/{1.73_M2}
GLUCOSE FASTING: 165 MG/DL (ref 70–99)
HBA1C MFR BLD: 7.6 % (ref 4.8–5.9)
POTASSIUM SERPL-SCNC: 4.6 MEQ/L (ref 3.4–4.9)
SODIUM BLD-SCNC: 141 MEQ/L (ref 135–144)
T4 FREE: 1.37 NG/DL (ref 0.84–1.68)
TSH REFLEX: 4.11 UIU/ML (ref 0.44–3.86)

## 2022-10-25 ENCOUNTER — OFFICE VISIT (OUTPATIENT)
Dept: ENDOCRINOLOGY | Age: 52
End: 2022-10-25
Payer: COMMERCIAL

## 2022-10-25 VITALS
SYSTOLIC BLOOD PRESSURE: 139 MMHG | BODY MASS INDEX: 47.09 KG/M2 | OXYGEN SATURATION: 94 % | HEIGHT: 66 IN | WEIGHT: 293 LBS | HEART RATE: 99 BPM | DIASTOLIC BLOOD PRESSURE: 80 MMHG

## 2022-10-25 DIAGNOSIS — E10.9 TYPE 1 DIABETES MELLITUS WITHOUT COMPLICATION (HCC): Primary | ICD-10-CM

## 2022-10-25 DIAGNOSIS — E03.9 ACQUIRED HYPOTHYROIDISM: ICD-10-CM

## 2022-10-25 PROCEDURE — 99213 OFFICE O/P EST LOW 20 MIN: CPT | Performed by: INTERNAL MEDICINE

## 2022-10-25 PROCEDURE — 3051F HG A1C>EQUAL 7.0%<8.0%: CPT | Performed by: INTERNAL MEDICINE

## 2022-10-25 RX ORDER — BLOOD SUGAR DIAGNOSTIC
1 STRIP MISCELLANEOUS DAILY
Qty: 300 EACH | Refills: 3 | Status: SHIPPED | OUTPATIENT
Start: 2022-10-25

## 2022-10-25 RX ORDER — INSULIN GLARGINE 100 [IU]/ML
INJECTION, SOLUTION SUBCUTANEOUS
Qty: 40 ML | Refills: 3 | Status: SHIPPED | OUTPATIENT
Start: 2022-10-25

## 2022-10-25 NOTE — PROGRESS NOTES
10/25/2022    Assessment:       Diagnosis Orders   1. Type 1 diabetes mellitus without complication ([facility] [address]        2.  Acquired hypothyroidism              PLAN:     Orders Placed This Encounter   Procedures    T4, Free     Standing Status:   Future     Standing Expiration Date:   10/25/2023    TSH with Reflex     Standing Status:   Future     Standing Expiration Date:   10/25/2023    Hemoglobin A1C     Standing Status:   Future     Standing Expiration Date:   01/10/5113    Basic Metabolic Panel, Fasting     Standing Status:   Future     Standing Expiration Date:   10/25/2023    Microalbumin / Creatinine Urine Ratio     Standing Status:   Future     Standing Expiration Date:   10/25/2023     Continue current dose of Synthroid continue insulin via pump as per current pump setting A1c goal of 7 or lower  Orders Placed This Encounter   Medications    Insulin Syringe-Needle U-100 ([employer] INSULIN SYRINGE) 31G X 5/16\" 0.5 ML MISC     Si each by Does not apply route daily     Dispense:  300 each     Refill:  3    insulin glargine (LANTUS) 100 UNIT/ML injection vial     Sig: Indications: Pt to use as backup if insulin pump fails 40 units at bedtime     Dispense:  40 mL     Refill:  3       Subjective:     Chief Complaint   Patient presents with    Diabetes    Hypothyroidism    Obesity     Vitals:    10/25/22 0933   BP: 139/80   Pulse: 99   SpO2: 94%   Weight: (!) 369 lb (167.4 kg)   Height: 5' 6\" (1.676 m)     Wt Readings from Last 3 Encounters:   10/25/22 (!) 369 lb (167.4 kg)   22 (!) 365 lb (165.6 kg)   22 (!) 353 lb (160.1 kg)     BP Readings from Last 3 Encounters:   10/25/22 139/80   22 132/78   22 107/70     Follow-up on type 1 diabetes obesity hypothyroidism on replacement patient's pump was downloaded reviewed also requesting Lantus as a backup for the pump fails  History of chronic kidney disease  Hypothyroidism on Synthroid to 250 mcg daily last TSH was slightly high  Hemoglobin A1c was 7.6    Diabetes  She presents for her follow-up diabetic visit. She has type 1 diabetes mellitus. Associated symptoms include fatigue. Pertinent negatives for diabetes include no polydipsia, no polyuria and no weight loss. Symptoms are stable. Diabetic complications include nephropathy. Risk factors for coronary artery disease include obesity. Current diabetic treatment includes insulin pump. She never participates in exercise. Her overall blood glucose range is 140-180 mg/dl. (Hemoglobin A1C       Date                     Value               Ref Range           Status                10/20/2022               7.6 (H)             4.8 - 5.9 %         Final            ----------  Review 2-week pump download average blood sugar 138±34  90% in automatic mode  86% in range  12% hyperglycemia  1% severe hyperglycemia  1% mild hypoglycemia    Basal rate 2 units/h  Carb ratio 9 sensitivity 20)   Thyroid Problem  Presents for follow-up visit. Symptoms include fatigue. Patient reports no weight loss. The symptoms have been stable.    Past Medical History:   Diagnosis Date    Arthritis     right knee and ankle    Pilonidal cyst     Thyroid disease     hypothyroid    Type 1 diabetes [facility])      Past Surgical History:   Procedure Laterality Date    APPENDECTOMY      ARTHROSCOPY / ARTHROTOMY KNEE Right 11/8/2016    RIGHT KNEE ARTHROSCOPY / MEDIAL MENISCUS TEAR / SUPINE  performed by Donte Mckeon MD at [address] [place] [address]     Social History     Socioeconomic History    Marital status:      Spouse name: Not on file    Number of children: Not on file    Years of education: Not on file    Highest education level: Not on file   Occupational History    Not on file   Tobacco Use    Smoking status: Never    Smokeless tobacco: Never   Vaping Use    Vaping Use: Never used   Substance and Sexual Activity    Alcohol use: No    Drug use: No    Sexual activity: Yes   Other Topics Concern    Not on file   Social History Narrative    Not on file     Social Determinants of Health     Financial Resource Strain: Low Risk     Difficulty of Paying Living Expenses: Not hard at all   Food Insecurity: No Food Insecurity    Worried About Running Out of Food in the Last Year: Never true    Ran Out of Food in the Last Year: Never true   Transportation Needs: Not on file   Physical Activity: Not on file   Stress: Not on file   Social Connections: Not on file   Intimate Partner Violence: Not on file   Housing Stability: Not on file     Family History   Problem Relation Age of Onset    Arthritis Mother     Heart Disease Father     High Blood Pressure Father     No Known Problems Daughter      Allergies   Allergen Reactions    Shellfish Allergy Anxiety, Hives, Itching, Shortness Of Breath and Swelling    Poison Ivy Extract        Current Outpatient Medications:     QUEtiapine (SEROQUEL) 50 MG tablet, TAKE 1 TABLET EVERY MORNINGAND TAKE 2 TABLETS EVERY   EVENING, Disp: 270 tablet, Rfl: 3    lisinopril (PRINIVIL;ZESTRIL) 10 MG tablet, TAKE 1 TABLET DAILY, Disp: 90 tablet, Rfl: 3    pantoprazole (PROTONIX) 40 MG tablet, TAKE 1 TABLET DAILY, Disp: 90 tablet, Rfl: 3    HUMALOG 100 UNIT/ML SOLN injection vial, INJECT PER INSULIN PUMP    MAXIMUM DOSE 100 UNITS PER DAY SUBCUTANEOUSLY, Disp: 90 mL, Rfl: 3    traZODone (DESYREL) 50 MG tablet, TAKE 3 TABLETS AT BEDTIME, Disp: 270 tablet, Rfl: 3    topiramate (TOPAMAX) 25 MG tablet, TAKE 1 TABLET BY MOUTH THREE TIMES DAILY 1 (ONE) hour before meals with a big glass OF water, Disp: , Rfl:     diclofenac (VOLTAREN) 75 MG EC tablet, TAKE 1 TABLET BY MOUTH TWICE DAILY AFTER MEALS, Disp: , Rfl:     levothyroxine (SYNTHROID) 125 MCG tablet, TAKE 2 TABLETS DAILY, Disp: 180 tablet, Rfl: 3    OXYGEN, Discontinue Oxygen, Disp: 1 Units, Rfl: 0    albuterol sulfate HFA (VENTOLIN HFA) 108 (90 Base) MCG/ACT inhaler, Inhale 2 puffs into the lungs 4 times daily as needed for Wheezing, Disp: 1 Inhaler, Rfl: 0    traMADol [facility]) 100 MG extended release tablet, Take 1 tablet by mouth every night as needed for pain, Disp: , Rfl:     albuterol sulfate  (90 Base) MCG/ACT inhaler, INHALE 2 PUFFS EVERY 4 TO 6 HOURS AS NEEDED, Disp: , Rfl: 0    mometasone (ELOCON) 0.1 % cream, Apply topically daily. , Disp: 45 g, Rfl: 2    calcium carb-cholecalciferol 600-200 MG-UNIT TABS tablet, one a day, Disp: , Rfl:     LYRICA 150 MG capsule, Place 150 mg into the right eye 2 times daily. , Disp: , Rfl: 0    insulin glargine (LANTUS) 100 UNIT/ML injection vial, 40 units at bedtime (Patient taking differently: Indications: Pt to use as backup if insulin pump fails 40 units at bedtime), Disp: 40 mL, Rfl: 3    Insulin Syringe-Needle U-100 ([employer] INSULIN SYRINGE) 31G X 5/16\" 0.5 ML MISC, 1 each by Does not apply route daily, Disp: 300 each, Rfl: 3    ALPRAZolam (XANAX) 0.5 MG tablet, TAKE 1 TABLET BY MOUTH THREE TIMES DAILY AS NEEDED for sleep or anxiety for up to 30 days, Disp: 90 tablet, Rfl: 2  Lab Results   Component Value Date     10/20/2022    K 4.6 10/20/2022     10/20/2022    CO2 23 10/20/2022    BUN 22 (H) 10/20/2022    CREATININE 1.59 (H) 10/20/2022    GLUCOSE 151 (H) 12/20/2021    CALCIUM 8.9 10/20/2022    PROT 6.4 04/17/2021    LABALBU 2.9 (L) 04/17/2021    BILITOT 0.4 04/17/2021    ALKPHOS 117 04/17/2021    AST 37 (H) 04/17/2021    ALT 41 (H) 04/17/2021    LABGLOM 38.9 (L) 10/20/2022    GFRAA 45.2 (L) 06/17/2022    GLOB 3.5 04/17/2021     Lab Results   Component Value Date    WBC 7.9 04/19/2021    HGB 12.0 04/19/2021    HCT 38.0 04/19/2021    MCV 74.2 (L) 04/19/2021     04/19/2021     Lab Results   Component Value Date    LABA1C 7.6 (H) 10/20/2022    LABA1C 7.4 (H) 06/17/2022    LABA1C 7.3 (H) 12/20/2021     Lab Results   Component Value Date    CHOLFAST 166 06/17/2022    TRIGLYCFAST 128 06/17/2022    HDL 53 06/17/2022    HDL 63 (H) 01/08/2021    HDL 67 (H) 09/25/2018    LDLCALC 87 06/17/2022    LDLCALC 92 01/08/2021    LDLCALC 84 09/25/2018    CHOL 173 01/08/2021    CHOL 164 09/25/2018    CHOL 168 06/10/2014    TRIG 89 01/08/2021    TRIG 67 09/25/2018    TRIG 72 06/10/2014     No results found for: TESTM  Lab Results   Component Value Date    TSH 1.510 12/20/2021    TSH 0.208 (L) 04/12/2021    TSH 0.861 01/08/2021    TSHREFLEX 4.110 (H) 10/20/2022    TSHREFLEX 2.290 06/17/2022    TSHREFLEX 0.624 06/28/2021    T4FREE 1.37 10/20/2022    T4FREE 1.47 06/17/2022    T4FREE 1.64 12/20/2021     No results found for: TPOABS    Review of Systems   Constitutional:  Positive for fatigue. Negative for weight loss. Eyes: Negative. Endocrine: Negative for polydipsia and polyuria. Musculoskeletal:  Positive for arthralgias. All other systems reviewed and are negative. Objective:   Physical Exam  Vitals reviewed. Constitutional:       General: She is not in acute distress. Appearance: Normal appearance. She is obese. HENT:      Head: Normocephalic and atraumatic. Right Ear: External ear normal.      Left Ear: External ear normal.      Nose: Nose normal.   Eyes:      General: No scleral icterus. Right eye: No discharge. Left eye: No discharge. Extraocular Movements: Extraocular movements intact. Conjunctiva/sclera: Conjunctivae normal.   Cardiovascular:      Rate and Rhythm: Normal rate. Pulmonary:      Effort: Pulmonary effort is normal.   Musculoskeletal:         General: Normal range of motion. Cervical back: Normal range of motion and neck supple. Neurological:      General: No focal deficit present. Mental Status: She is alert and oriented to person, place, and time.    Psychiatric:         Mood and Affect: Mood normal.         Behavior: Behavior normal.

## 2022-10-30 ASSESSMENT — ENCOUNTER SYMPTOMS: EYES NEGATIVE: 1

## 2022-11-08 ENCOUNTER — OFFICE VISIT (OUTPATIENT)
Dept: FAMILY MEDICINE CLINIC | Age: 52
End: 2022-11-08
Payer: COMMERCIAL

## 2022-11-08 VITALS
RESPIRATION RATE: 22 BRPM | TEMPERATURE: 97.7 F | DIASTOLIC BLOOD PRESSURE: 70 MMHG | HEART RATE: 104 BPM | HEIGHT: 66 IN | BODY MASS INDEX: 59.56 KG/M2 | SYSTOLIC BLOOD PRESSURE: 132 MMHG | OXYGEN SATURATION: 98 %

## 2022-11-08 DIAGNOSIS — G47.33 OSA (OBSTRUCTIVE SLEEP APNEA): ICD-10-CM

## 2022-11-08 DIAGNOSIS — R30.0 DYSURIA: ICD-10-CM

## 2022-11-08 DIAGNOSIS — F41.9 ANXIETY: Primary | ICD-10-CM

## 2022-11-08 LAB
BILIRUBIN, POC: NORMAL
BLOOD URINE, POC: NORMAL
CLARITY, POC: NORMAL
COLOR, POC: YELLOW
GLUCOSE URINE, POC: NORMAL
KETONES, POC: NORMAL
LEUKOCYTE EST, POC: NORMAL
NITRITE, POC: NORMAL
PH, POC: 5.5
PROTEIN, POC: NORMAL
SPECIFIC GRAVITY, POC: 1.02
UROBILINOGEN, POC: NORMAL

## 2022-11-08 PROCEDURE — 90677 PCV20 VACCINE IM: CPT | Performed by: FAMILY MEDICINE

## 2022-11-08 PROCEDURE — 81003 URINALYSIS AUTO W/O SCOPE: CPT | Performed by: FAMILY MEDICINE

## 2022-11-08 PROCEDURE — 90471 IMMUNIZATION ADMIN: CPT | Performed by: FAMILY MEDICINE

## 2022-11-08 PROCEDURE — 99213 OFFICE O/P EST LOW 20 MIN: CPT | Performed by: FAMILY MEDICINE

## 2022-11-08 ASSESSMENT — ENCOUNTER SYMPTOMS
EYES NEGATIVE: 1
RHINORRHEA: 0
CHEST TIGHTNESS: 0
COUGH: 0
GASTROINTESTINAL NEGATIVE: 1
RESPIRATORY NEGATIVE: 1

## 2022-11-08 NOTE — PROGRESS NOTES
Patient is seen in follow up for   Chief Complaint   Patient presents with    Check-Up    Anxiety     Xanax//uds and contract done 6/23/22     Anxiety  Patient reports no dizziness. here for follow up on xanax working well.     Past Medical History:   Diagnosis Date    Arthritis     right knee and ankle    Pilonidal cyst     Thyroid disease     hypothyroid    Type 1 diabetes [facility])      Patient Active Problem List    Diagnosis Date Noted    Bronchitis 08/17/2021    Dyspnea 08/17/2021    Hypoxia 05/21/2021    COVID-19 virus infection     Respiratory failure ([address] 04/12/2021    Brow ptosis 01/22/2016    Long-term insulin use ([address] 01/22/2016    Nuclear sclerotic cataract of both eyes 01/22/2016    Background retinopathy due to secondary diabetes ([address] 10/16/2014    Age related cataract 10/16/2014    Type 1 diabetes mellitus ([address] 07/02/2014    Obesity 08/18/2013    Panic attacks 11/29/2012    Pilonidal cyst     Vitamin D deficiency 03/28/2011    Chronic lymphocytic thyroiditis 11/05/2002    Acquired hypothyroidism 11/05/2002     Past Surgical History:   Procedure Laterality Date    APPENDECTOMY      ARTHROSCOPY / ARTHROTOMY KNEE Right 11/8/2016    RIGHT KNEE ARTHROSCOPY / MEDIAL MENISCUS TEAR / SUPINE  performed by Geremias Coughlin MD at [address] [place] [address]     Family History   Problem Relation Age of Onset    Arthritis Mother     Heart Disease Father     High Blood Pressure Father     No Known Problems Daughter      Social History     Socioeconomic History    Marital status:      Spouse name: None    Number of children: None    Years of education: None    Highest education level: None   Tobacco Use    Smoking status: Never    Smokeless tobacco: Never   Vaping Use    Vaping Use: Never used   Substance and Sexual Activity    Alcohol use: No    Drug use: No    Sexual activity: Yes     Social Determinants of Health     Financial Resource Strain: Low Risk     Difficulty of Paying Living Expenses: Not hard at all   Food Insecurity: No Food Insecurity    Worried About [facility] in the Last Year: Never true    Ran Out of Food in the Last Year: Never true     Current Outpatient Medications   Medication Sig Dispense Refill    Insulin Syringe-Needle U-100 ([employer] INSULIN SYRINGE) 31G X 5/16\" 0.5 ML MISC 1 each by Does not apply route daily 300 each 3    insulin glargine (LANTUS) 100 UNIT/ML injection vial Indications: Pt to use as backup if insulin pump fails 40 units at bedtime 40 mL 3    QUEtiapine (SEROQUEL) 50 MG tablet TAKE 1 TABLET EVERY MORNINGAND TAKE 2 TABLETS EVERY   EVENING 270 tablet 3    lisinopril (PRINIVIL;ZESTRIL) 10 MG tablet TAKE 1 TABLET DAILY 90 tablet 3    pantoprazole (PROTONIX) 40 MG tablet TAKE 1 TABLET DAILY 90 tablet 3    HUMALOG 100 UNIT/ML SOLN injection vial INJECT PER INSULIN PUMP    MAXIMUM DOSE 100 UNITS PER DAY SUBCUTANEOUSLY 90 mL 3    traZODone (DESYREL) 50 MG tablet TAKE 3 TABLETS AT BEDTIME 270 tablet 3    ALPRAZolam (XANAX) 0.5 MG tablet TAKE 1 TABLET BY MOUTH THREE TIMES DAILY AS NEEDED for sleep or anxiety for up to 30 days 90 tablet 2    topiramate (TOPAMAX) 25 MG tablet TAKE 1 TABLET BY MOUTH THREE TIMES DAILY 1 (ONE) hour before meals with a big glass OF water      diclofenac (VOLTAREN) 75 MG EC tablet TAKE 1 TABLET BY MOUTH TWICE DAILY AFTER MEALS      levothyroxine (SYNTHROID) 125 MCG tablet TAKE 2 TABLETS DAILY 180 tablet 3    OXYGEN Discontinue Oxygen 1 Units 0    albuterol sulfate HFA (VENTOLIN HFA) 108 (90 Base) MCG/ACT inhaler Inhale 2 puffs into the lungs 4 times daily as needed for Wheezing 1 Inhaler 0    traMADol (ULTRAM ER) 100 MG extended release tablet Take 1 tablet by mouth every night as needed for pain      albuterol sulfate  (90 Base) MCG/ACT inhaler INHALE 2 PUFFS EVERY 4 TO 6 HOURS AS NEEDED  0    mometasone (ELOCON) 0.1 % cream Apply topically daily.  45 g 2    calcium carb-cholecalciferol 600-200 MG-UNIT TABS tablet one a day LYRICA 150 MG capsule Place 150 mg into the right eye 2 times daily. 0     No current facility-administered medications for this visit. Current Outpatient Medications on File Prior to Visit   Medication Sig Dispense Refill    Insulin Syringe-Needle U-100 ([employer] INSULIN SYRINGE) 31G X 5/16\" 0.5 ML MISC 1 each by Does not apply route daily 300 each 3    insulin glargine (LANTUS) 100 UNIT/ML injection vial Indications: Pt to use as backup if insulin pump fails 40 units at bedtime 40 mL 3    QUEtiapine (SEROQUEL) 50 MG tablet TAKE 1 TABLET EVERY MORNINGAND TAKE 2 TABLETS EVERY   EVENING 270 tablet 3    lisinopril (PRINIVIL;ZESTRIL) 10 MG tablet TAKE 1 TABLET DAILY 90 tablet 3    pantoprazole (PROTONIX) 40 MG tablet TAKE 1 TABLET DAILY 90 tablet 3    HUMALOG 100 UNIT/ML SOLN injection vial INJECT PER INSULIN PUMP    MAXIMUM DOSE 100 UNITS PER DAY SUBCUTANEOUSLY 90 mL 3    traZODone (DESYREL) 50 MG tablet TAKE 3 TABLETS AT BEDTIME 270 tablet 3    ALPRAZolam (XANAX) 0.5 MG tablet TAKE 1 TABLET BY MOUTH THREE TIMES DAILY AS NEEDED for sleep or anxiety for up to 30 days 90 tablet 2    topiramate (TOPAMAX) 25 MG tablet TAKE 1 TABLET BY MOUTH THREE TIMES DAILY 1 (ONE) hour before meals with a big glass OF water      diclofenac (VOLTAREN) 75 MG EC tablet TAKE 1 TABLET BY MOUTH TWICE DAILY AFTER MEALS      levothyroxine (SYNTHROID) 125 MCG tablet TAKE 2 TABLETS DAILY 180 tablet 3    OXYGEN Discontinue Oxygen 1 Units 0    albuterol sulfate HFA (VENTOLIN HFA) 108 (90 Base) MCG/ACT inhaler Inhale 2 puffs into the lungs 4 times daily as needed for Wheezing 1 Inhaler 0    traMADol (ULTRAM ER) 100 MG extended release tablet Take 1 tablet by mouth every night as needed for pain      albuterol sulfate  (90 Base) MCG/ACT inhaler INHALE 2 PUFFS EVERY 4 TO 6 HOURS AS NEEDED  0    mometasone (ELOCON) 0.1 % cream Apply topically daily.  45 g 2    calcium carb-cholecalciferol 600-200 MG-UNIT TABS tablet one a day      LYRICA 150 MG capsule Place 150 mg into the right eye 2 times daily. 0     No current facility-administered medications on file prior to visit. Allergies   Allergen Reactions    Shellfish Allergy Anxiety, Hives, Itching, Shortness Of Breath and Swelling    Poison Ivy Extract      Health Maintenance   Topic Date Due    DTaP/Tdap/Td vaccine (1 - Tdap) Never done    Cervical cancer screen  Never done    Hepatitis B vaccine (3 of 3 - Risk 3-dose series) 06/28/2011    Colorectal Cancer Screen  Never done    Breast cancer screen  11/01/2020    Shingles vaccine (1 of 2) Never done    COVID-19 Vaccine (3 - Booster for Pfizer series) 08/05/2021    Diabetic retinal exam  04/05/2022    Diabetic microalbuminuria test  06/28/2022    Diabetic foot exam  07/02/2022    Flu vaccine (1) Never done    HIV screen  11/21/2028 (Originally 11/1/1985)    Lipids  06/17/2023    Depression Screen  06/23/2023    A1C test (Diabetic or Prediabetic)  10/20/2023    Pneumococcal 0-64 years Vaccine  Completed    Hepatitis C screen  Completed    Hepatitis A vaccine  Aged Out    Hib vaccine  Aged Out    Meningococcal (ACWY) vaccine  Aged Out       Review of Systems     Review of Systems   Constitutional:  Negative for activity change, appetite change, fatigue and fever. HENT:  Negative for congestion and rhinorrhea. Eyes: Negative. Respiratory: Negative. Negative for cough and chest tightness. Cardiovascular: Negative. Gastrointestinal: Negative. Endocrine: Negative. Genitourinary: Negative. Musculoskeletal: Negative. Skin: Negative. Neurological:  Negative for dizziness, light-headedness and numbness. Hematological: Negative. Psychiatric/Behavioral: Negative. Physical Exam  Vitals:    11/08/22 0906   BP: 132/70   Pulse: (!) 104   Resp: 22   Temp: 97.7 °F (36.5 °C)   SpO2: 98%   Height: 5' 6\" (1.676 m)       Physical Exam  Constitutional:       Appearance: She is well-developed.    HENT:      Right Ear: External ear normal.      Left Ear: External ear normal.   Eyes:      Pupils: Pupils are equal, round, and reactive to light. Neck:      Thyroid: No thyromegaly. Cardiovascular:      Rate and Rhythm: Normal rate and regular rhythm. Heart sounds: Normal heart sounds. No murmur heard. No friction rub. No gallop. Pulmonary:      Effort: Pulmonary effort is normal. No respiratory distress. Breath sounds: No wheezing or rales. Chest:      Chest wall: No tenderness. Abdominal:      General: Bowel sounds are normal. There is no distension. Palpations: Abdomen is soft. There is no mass. Tenderness: There is no abdominal tenderness. There is no guarding or rebound. Musculoskeletal:         General: Normal range of motion. Cervical back: Normal range of motion and neck supple. Lymphadenopathy:      Cervical: No cervical adenopathy. Skin:     General: Skin is warm and dry. Neurological:      Mental Status: She is alert and oriented to person, place, and time. Cranial Nerves: No cranial nerve deficit. Coordination: Coordination normal.     Controlled Substance Monitoring:    Acute and Chronic Pain Monitoring:   RX Monitoring 11/26/2018   Attestation The Prescription Monitoring Report for this patient was reviewed today. Periodic Controlled Substance Monitoring Possible medication side effects, risk of tolerance/dependence & alternative treatments discussed. ;No signs of potential drug abuse or diversion identified. Patient has witnessed apnea at home. Assessment   Diagnosis Orders   1. Anxiety        2. STEFANO (obstructive sleep apnea)  Baseline Diagnostic Sleep Study      3. Dysuria  POCT Urinalysis No Micro (Auto)        Problem List    None    Plan  Continue current treatment. No orders of the defined types were placed in this encounter. No follow-ups on file.   Jhon Limon MD

## 2022-11-21 RX ORDER — LEVOTHYROXINE SODIUM 0.12 MG/1
TABLET ORAL
Qty: 180 TABLET | Refills: 3 | Status: SHIPPED | OUTPATIENT
Start: 2022-11-21

## 2023-01-04 ENCOUNTER — TELEPHONE (OUTPATIENT)
Dept: GASTROENTEROLOGY | Age: 53
End: 2023-01-04

## 2023-01-04 ENCOUNTER — TELEPHONE (OUTPATIENT)
Dept: FAMILY MEDICINE CLINIC | Age: 53
End: 2023-01-04

## 2023-01-04 DIAGNOSIS — Z12.31 ENCOUNTER FOR SCREENING MAMMOGRAM FOR MALIGNANT NEOPLASM OF BREAST: Primary | ICD-10-CM

## 2023-01-04 DIAGNOSIS — Z12.11 COLON CANCER SCREENING: Primary | ICD-10-CM

## 2023-01-04 NOTE — TELEPHONE ENCOUNTER
Spoke to patient, agreeable to colonoscopy. Patient scheduled 5/22/2023 at 10:30 am. Miralax Prep mailed to patient. Referral pended to PCP. Info faxed to [facility].

## 2023-01-10 ENCOUNTER — HOSPITAL ENCOUNTER (OUTPATIENT)
Dept: SLEEP CENTER | Age: 53
Discharge: HOME | End: 2023-01-12
Payer: COMMERCIAL

## 2023-01-10 PROCEDURE — 95810 POLYSOM 6/> YRS 4/> PARAM: CPT

## 2023-01-23 DIAGNOSIS — G47.33 OSA (OBSTRUCTIVE SLEEP APNEA): Primary | ICD-10-CM

## 2023-01-26 ENCOUNTER — OFFICE VISIT (OUTPATIENT)
Dept: FAMILY MEDICINE CLINIC | Age: 53
End: 2023-01-26
Payer: COMMERCIAL

## 2023-01-26 ENCOUNTER — HOSPITAL ENCOUNTER (OUTPATIENT)
Dept: WOMENS IMAGING | Age: 53
Discharge: HOME | End: 2023-01-28
Payer: COMMERCIAL

## 2023-01-26 VITALS
WEIGHT: 293 LBS | BODY MASS INDEX: 47.09 KG/M2 | HEIGHT: 66 IN | HEART RATE: 83 BPM | SYSTOLIC BLOOD PRESSURE: 132 MMHG | TEMPERATURE: 97 F | OXYGEN SATURATION: 97 % | RESPIRATION RATE: 15 BRPM | DIASTOLIC BLOOD PRESSURE: 76 MMHG

## 2023-01-26 DIAGNOSIS — Z12.31 ENCOUNTER FOR SCREENING MAMMOGRAM FOR MALIGNANT NEOPLASM OF BREAST: ICD-10-CM

## 2023-01-26 DIAGNOSIS — M79.671 RIGHT FOOT PAIN: Primary | ICD-10-CM

## 2023-01-26 PROCEDURE — 77067 SCR MAMMO BI INCL CAD: CPT

## 2023-01-26 PROCEDURE — 99213 OFFICE O/P EST LOW 20 MIN: CPT | Performed by: PHYSICIAN ASSISTANT

## 2023-01-26 ASSESSMENT — PATIENT HEALTH QUESTIONNAIRE - PHQ9
1. LITTLE INTEREST OR PLEASURE IN DOING THINGS: 0
SUM OF ALL RESPONSES TO PHQ QUESTIONS 1-9: 0
SUM OF ALL RESPONSES TO PHQ QUESTIONS 1-9: 0
SUM OF ALL RESPONSES TO PHQ9 QUESTIONS 1 & 2: 0
SUM OF ALL RESPONSES TO PHQ QUESTIONS 1-9: 0
SUM OF ALL RESPONSES TO PHQ QUESTIONS 1-9: 0
2. FEELING DOWN, DEPRESSED OR HOPELESS: 0

## 2023-01-26 ASSESSMENT — ENCOUNTER SYMPTOMS
GASTROINTESTINAL NEGATIVE: 1
EYES NEGATIVE: 1
RESPIRATORY NEGATIVE: 1

## 2023-01-26 NOTE — PROGRESS NOTES
[address] [facility]  [address] [facility] [address]  Dept: Janice [place]: [phone]     Pt Name: Sepideh Middleton  MRN: [id number]  Zo[place] 1970      HISTORY OF PRESENT ILLNESS    Sepideh Middleton is a 46 y.o. female who presents to the [facility] with chief complaint of stepping on a piece of dog food and injuring her right foot about 2 weeks ago. She says she has joint laxity in her right ankle and typically wears an ankle brace. She twister her ankle before bed and says it was causing discomfort. She doesn't know if she has a history of heels spurs. REVIEW OF SYSTEMS       Review of Systems   Constitutional: Negative. HENT: Negative. Eyes: Negative. Respiratory: Negative. Cardiovascular: Negative. Gastrointestinal: Negative. Endocrine: Negative. Genitourinary: Negative. Musculoskeletal:  Positive for arthralgias. Right heel    Skin: Negative. Neurological: Negative. Psychiatric/Behavioral: Negative.          PAST MEDICAL HISTORY     Past Medical History:   Diagnosis Date    Arthritis     right knee and ankle    Pilonidal cyst     Thyroid disease     hypothyroid    Type 1 diabetes ([facility] [address]          SURGICAL HISTORY       Past Surgical History:   Procedure Laterality Date    APPENDECTOMY      ARTHROSCOPY / ARTHROTOMY KNEE Right 11/8/2016    RIGHT KNEE ARTHROSCOPY / MEDIAL MENISCUS TEAR / SUPINE  performed by Deyanira Herring MD at [address] [place] [address]         CURRENT MEDICATIONS       Current Outpatient Medications   Medication Sig Dispense Refill    levothyroxine (SYNTHROID) 125 MCG tablet TAKE 2 TABLETS DAILY 180 tablet 3    Insulin Syringe-Needle U-100 ([employer] INSULIN SYRINGE) 31G X 5/16\" 0.5 ML MISC 1 each by Does not apply route daily 300 each 3    insulin glargine (LANTUS) 100 UNIT/ML injection vial Indications: Pt to use as backup if insulin pump fails 40 units at bedtime 40 mL 3    QUEtiapine (SEROQUEL) 50 MG tablet TAKE 1 TABLET EVERY MORNINGAND TAKE 2 TABLETS EVERY   EVENING 270 tablet 3    lisinopril (PRINIVIL;ZESTRIL) 10 MG tablet TAKE 1 TABLET DAILY 90 tablet 3    pantoprazole (PROTONIX) 40 MG tablet TAKE 1 TABLET DAILY 90 tablet 3    HUMALOG 100 UNIT/ML SOLN injection vial INJECT PER INSULIN PUMP    MAXIMUM DOSE 100 UNITS PER DAY SUBCUTANEOUSLY 90 mL 3    traZODone (DESYREL) 50 MG tablet TAKE 3 TABLETS AT BEDTIME 270 tablet 3    topiramate (TOPAMAX) 25 MG tablet TAKE 1 TABLET BY MOUTH THREE TIMES DAILY 1 (ONE) hour before meals with a big glass OF water      diclofenac (VOLTAREN) 75 MG EC tablet TAKE 1 TABLET BY MOUTH TWICE DAILY AFTER MEALS      OXYGEN Discontinue Oxygen 1 Units 0    albuterol sulfate HFA (VENTOLIN HFA) 108 (90 Base) MCG/ACT inhaler Inhale 2 puffs into the lungs 4 times daily as needed for Wheezing 1 Inhaler 0    traMADol (ULTRAM ER) 100 MG extended release tablet Take 1 tablet by mouth every night as needed for pain      albuterol sulfate  (90 Base) MCG/ACT inhaler INHALE 2 PUFFS EVERY 4 TO 6 HOURS AS NEEDED  0    mometasone (ELOCON) 0.1 % cream Apply topically daily. 45 g 2    calcium carb-cholecalciferol 600-200 MG-UNIT TABS tablet one a day      LYRICA 150 MG capsule Place 150 mg into the right eye 2 times daily. 0    ALPRAZolam (XANAX) 0.5 MG tablet TAKE 1 TABLET BY MOUTH THREE TIMES DAILY AS NEEDED for sleep or anxiety for up to 30 days 90 tablet 2     No current facility-administered medications for this visit.       ALLERGIES     Shellfish allergy and Poison ivy extract    FAMILY HISTORY       Family History   Problem Relation Age of Onset    Arthritis Mother     Heart Disease Father     High Blood Pressure Father     No Known Problems Daughter           SOCIAL HISTORY       Social History     Socioeconomic History    Marital status:      Spouse name: None    Number of children: None    Years of education: None    Highest education level: None   Tobacco Use    Smoking status: Never    Smokeless tobacco: Never   Vaping Use    Vaping Use: Never used   Substance and Sexual Activity    Alcohol use: No    Drug use: No    Sexual activity: Yes     Social Determinants of Health     Financial Resource Strain: Low Risk     Difficulty of Paying Living Expenses: Not hard at all   Food Insecurity: No Food Insecurity    Worried About Running Out of Food in the Last Year: Never true    Ran Out of Food in the Last Year: Never true         PHYSICAL EXAM    (up to 7 for level 4, 8 or more for level 5)       Physical Exam  Constitutional:       General: She is not in acute distress. Appearance: She is well-developed. She is obese. HENT:      Head: Normocephalic and atraumatic. Eyes:      Conjunctiva/sclera: Conjunctivae normal.      Pupils: Pupils are equal, round, and reactive to light. Cardiovascular:      Rate and Rhythm: Normal rate and regular rhythm. Heart sounds: No murmur heard. Pulmonary:      Effort: No respiratory distress. Breath sounds: Normal breath sounds. No wheezing or rales. Abdominal:      General: There is no distension. Palpations: Abdomen is soft. Tenderness: There is no abdominal tenderness. Musculoskeletal:         General: Normal range of motion. Cervical back: Normal range of motion and neck supple. Right foot: Bony tenderness present. Comments: Tenderness of right heel to palpation that does not extend down into the arch (plantar fascia area)    Skin:     General: Skin is warm and dry. Findings: No erythema or rash. Neurological:      Mental Status: She is alert and oriented to person, place, and time. Cranial Nerves: No cranial nerve deficit. Psychiatric:         Judgment: Judgment normal.         All other labs were within normal range or not returned as of this dictation.     Vitals:    Vitals:    01/26/23 1010   BP: 132/76   Site: Right Lower Arm   Position: Sitting   Cuff Size: Medium Adult   Pulse: 83   Resp: 15   Temp: 97 °F (36.1 °C)   TempSrc: Temporal   SpO2: 97%   Weight: (!) 369 lb (167.4 kg)   Height: 5' 6\" (1.676 m)       Patient has seen Dr. Michaelene Spatz in the past for podiatry. Xray is pending. Patient already takes voltaren for arthritis and is to add in 7 minute ice massages. Continue wearing supportive shoes. DISPOSITION/PLAN   1.  Right foot pain

## 2023-01-28 PROBLEM — G47.33 OSA (OBSTRUCTIVE SLEEP APNEA): Status: ACTIVE | Noted: 2023-01-28

## 2023-02-02 ENCOUNTER — OFFICE VISIT (OUTPATIENT)
Dept: PULMONOLOGY | Age: 53
End: 2023-02-02
Payer: COMMERCIAL

## 2023-02-02 ENCOUNTER — OFFICE VISIT (OUTPATIENT)
Dept: FAMILY MEDICINE CLINIC | Age: 53
End: 2023-02-02
Payer: COMMERCIAL

## 2023-02-02 VITALS
HEIGHT: 66 IN | SYSTOLIC BLOOD PRESSURE: 128 MMHG | DIASTOLIC BLOOD PRESSURE: 76 MMHG | OXYGEN SATURATION: 97 % | HEART RATE: 100 BPM | WEIGHT: 293 LBS | TEMPERATURE: 97.6 F | BODY MASS INDEX: 47.09 KG/M2

## 2023-02-02 VITALS
HEART RATE: 102 BPM | RESPIRATION RATE: 22 BRPM | WEIGHT: 293 LBS | DIASTOLIC BLOOD PRESSURE: 82 MMHG | SYSTOLIC BLOOD PRESSURE: 130 MMHG | OXYGEN SATURATION: 98 % | TEMPERATURE: 97.9 F | BODY MASS INDEX: 47.09 KG/M2 | HEIGHT: 66 IN

## 2023-02-02 DIAGNOSIS — E66.9 OBESITY, UNSPECIFIED CLASSIFICATION, UNSPECIFIED OBESITY TYPE, UNSPECIFIED WHETHER SERIOUS COMORBIDITY PRESENT: ICD-10-CM

## 2023-02-02 DIAGNOSIS — F41.9 ANXIETY: Primary | ICD-10-CM

## 2023-02-02 DIAGNOSIS — R06.02 SHORTNESS OF BREATH: ICD-10-CM

## 2023-02-02 DIAGNOSIS — F41.0 PANIC ATTACKS: ICD-10-CM

## 2023-02-02 DIAGNOSIS — G47.34 SLEEP RELATED HYPOXIA: ICD-10-CM

## 2023-02-02 DIAGNOSIS — G47.33 OBSTRUCTIVE SLEEP APNEA: Primary | ICD-10-CM

## 2023-02-02 PROCEDURE — 99204 OFFICE O/P NEW MOD 45 MIN: CPT | Performed by: INTERNAL MEDICINE

## 2023-02-02 PROCEDURE — 99213 OFFICE O/P EST LOW 20 MIN: CPT | Performed by: FAMILY MEDICINE

## 2023-02-02 RX ORDER — ALPRAZOLAM 0.5 MG/1
TABLET ORAL
Qty: 90 TABLET | Refills: 2 | Status: SHIPPED | OUTPATIENT
Start: 2023-02-02 | End: 2023-05-05

## 2023-02-02 SDOH — ECONOMIC STABILITY: INCOME INSECURITY: HOW HARD IS IT FOR YOU TO PAY FOR THE VERY BASICS LIKE FOOD, HOUSING, MEDICAL CARE, AND HEATING?: NOT HARD AT ALL

## 2023-02-02 SDOH — ECONOMIC STABILITY: HOUSING INSECURITY
IN THE LAST 12 MONTHS, WAS THERE A TIME WHEN YOU DID NOT HAVE A STEADY PLACE TO SLEEP OR SLEPT IN A SHELTER (INCLUDING NOW)?: NO

## 2023-02-02 SDOH — ECONOMIC STABILITY: FOOD INSECURITY: WITHIN THE PAST 12 MONTHS, YOU WORRIED THAT YOUR FOOD WOULD RUN OUT BEFORE YOU GOT MONEY TO BUY MORE.: NEVER TRUE

## 2023-02-02 SDOH — ECONOMIC STABILITY: FOOD INSECURITY: WITHIN THE PAST 12 MONTHS, THE FOOD YOU BOUGHT JUST DIDN'T LAST AND YOU DIDN'T HAVE MONEY TO GET MORE.: NEVER TRUE

## 2023-02-02 ASSESSMENT — ENCOUNTER SYMPTOMS
RHINORRHEA: 0
RESPIRATORY NEGATIVE: 1
EYES NEGATIVE: 1
COUGH: 0
CHEST TIGHTNESS: 0
GASTROINTESTINAL NEGATIVE: 1

## 2023-02-02 NOTE — PROGRESS NOTES
Patient is seen in follow up for   Chief Complaint   Patient presents with    3 Month Follow-Up     Uds and contract done 6/3/22    Anxiety     xanax     Anxiety  Patient reports no dizziness. here for follow up on anxiety doing well.     Past Medical History:   Diagnosis Date    Arthritis     right knee and ankle    Pilonidal cyst     Thyroid disease     hypothyroid    Type 1 diabetes [facility])      Patient Active Problem List    Diagnosis Date Noted    STEFANO (obstructive sleep apnea) 01/28/2023    Bronchitis 08/17/2021    Dyspnea 08/17/2021    Hypoxia 05/21/2021    COVID-19 virus infection     Respiratory failure ([address] 04/12/2021    Brow ptosis 01/22/2016    Long-term insulin use ([address] 01/22/2016    Nuclear sclerotic cataract of both eyes 01/22/2016    Background retinopathy due to secondary diabetes ([address] 10/16/2014    Age related cataract 10/16/2014    Type 1 diabetes mellitus ([address] 07/02/2014    Obesity 08/18/2013    Panic attacks 11/29/2012    Pilonidal cyst     Vitamin D deficiency 03/28/2011    Chronic lymphocytic thyroiditis 11/05/2002    Acquired hypothyroidism 11/05/2002     Past Surgical History:   Procedure Laterality Date    APPENDECTOMY      ARTHROSCOPY / ARTHROTOMY KNEE Right 11/8/2016    RIGHT KNEE ARTHROSCOPY / MEDIAL MENISCUS TEAR / SUPINE  performed by Kim Sánchez MD at [address] [place] [address]     Family History   Problem Relation Age of Onset    Arthritis Mother     Heart Disease Father     High Blood Pressure Father     No Known Problems Daughter      Social History     Socioeconomic History    Marital status:      Spouse name: None    Number of children: None    Years of education: None    Highest education level: None   Tobacco Use    Smoking status: Never    Smokeless tobacco: Never   Vaping Use    Vaping Use: Never used   Substance and Sexual Activity    Alcohol use: No    Drug use: No    Sexual activity: Yes     Social Determinants of Health     Financial Resource Strain: Low Risk     Difficulty of Paying Living Expenses: Not hard at all   Food Insecurity: No Food Insecurity    Worried About [facility] in the Last Year: Never true    Ran Out of Food in the Last Year: Never true   Transportation Needs: Unknown    Lack of Transportation (Non-Medical): No   Housing Stability: Unknown    Unstable Housing in the Last Year: No     Current Outpatient Medications   Medication Sig Dispense Refill    ALPRAZolam (XANAX) 0.5 MG tablet TAKE 1 TABLET BY MOUTH THREE TIMES DAILY AS NEEDED for sleep or anxiety for up to 30 days 90 tablet 2    levothyroxine (SYNTHROID) 125 MCG tablet TAKE 2 TABLETS DAILY 180 tablet 3    Insulin Syringe-Needle U-100 ([employer] INSULIN SYRINGE) 31G X 5/16\" 0.5 ML MISC 1 each by Does not apply route daily 300 each 3    insulin glargine (LANTUS) 100 UNIT/ML injection vial Indications: Pt to use as backup if insulin pump fails 40 units at bedtime 40 mL 3    QUEtiapine (SEROQUEL) 50 MG tablet TAKE 1 TABLET EVERY MORNINGAND TAKE 2 TABLETS EVERY   EVENING 270 tablet 3    lisinopril (PRINIVIL;ZESTRIL) 10 MG tablet TAKE 1 TABLET DAILY 90 tablet 3    pantoprazole (PROTONIX) 40 MG tablet TAKE 1 TABLET DAILY 90 tablet 3    HUMALOG 100 UNIT/ML SOLN injection vial INJECT PER INSULIN PUMP    MAXIMUM DOSE 100 UNITS PER DAY SUBCUTANEOUSLY 90 mL 3    traZODone (DESYREL) 50 MG tablet TAKE 3 TABLETS AT BEDTIME 270 tablet 3    topiramate (TOPAMAX) 25 MG tablet TAKE 1 TABLET BY MOUTH THREE TIMES DAILY 1 (ONE) hour before meals with a big glass OF water      diclofenac (VOLTAREN) 75 MG EC tablet TAKE 1 TABLET BY MOUTH TWICE DAILY AFTER MEALS      OXYGEN Discontinue Oxygen 1 Units 0    albuterol sulfate HFA (VENTOLIN HFA) 108 (90 Base) MCG/ACT inhaler Inhale 2 puffs into the lungs 4 times daily as needed for Wheezing 1 Inhaler 0    traMADol (ULTRAM ER) 100 MG extended release tablet Take 1 tablet by mouth every night as needed for pain      albuterol sulfate  (90 Base) MCG/ACT inhaler INHALE 2 PUFFS EVERY 4 TO 6 HOURS AS NEEDED  0    mometasone (ELOCON) 0.1 % cream Apply topically daily. 45 g 2    calcium carb-cholecalciferol 600-200 MG-UNIT TABS tablet one a day      LYRICA 150 MG capsule Place 150 mg into the right eye 2 times daily. 0     No current facility-administered medications for this visit. Current Outpatient Medications on File Prior to Visit   Medication Sig Dispense Refill    levothyroxine (SYNTHROID) 125 MCG tablet TAKE 2 TABLETS DAILY 180 tablet 3    Insulin Syringe-Needle U-100 ([employer] INSULIN SYRINGE) 31G X 5/16\" 0.5 ML MISC 1 each by Does not apply route daily 300 each 3    insulin glargine (LANTUS) 100 UNIT/ML injection vial Indications: Pt to use as backup if insulin pump fails 40 units at bedtime 40 mL 3    QUEtiapine (SEROQUEL) 50 MG tablet TAKE 1 TABLET EVERY MORNINGAND TAKE 2 TABLETS EVERY   EVENING 270 tablet 3    lisinopril (PRINIVIL;ZESTRIL) 10 MG tablet TAKE 1 TABLET DAILY 90 tablet 3    pantoprazole (PROTONIX) 40 MG tablet TAKE 1 TABLET DAILY 90 tablet 3    HUMALOG 100 UNIT/ML SOLN injection vial INJECT PER INSULIN PUMP    MAXIMUM DOSE 100 UNITS PER DAY SUBCUTANEOUSLY 90 mL 3    traZODone (DESYREL) 50 MG tablet TAKE 3 TABLETS AT BEDTIME 270 tablet 3    topiramate (TOPAMAX) 25 MG tablet TAKE 1 TABLET BY MOUTH THREE TIMES DAILY 1 (ONE) hour before meals with a big glass OF water      diclofenac (VOLTAREN) 75 MG EC tablet TAKE 1 TABLET BY MOUTH TWICE DAILY AFTER MEALS      OXYGEN Discontinue Oxygen 1 Units 0    albuterol sulfate HFA (VENTOLIN HFA) 108 (90 Base) MCG/ACT inhaler Inhale 2 puffs into the lungs 4 times daily as needed for Wheezing 1 Inhaler 0    traMADol (ULTRAM ER) 100 MG extended release tablet Take 1 tablet by mouth every night as needed for pain      albuterol sulfate  (90 Base) MCG/ACT inhaler INHALE 2 PUFFS EVERY 4 TO 6 HOURS AS NEEDED  0    mometasone (ELOCON) 0.1 % cream Apply topically daily.  45 g 2    calcium carb-cholecalciferol 600-200 MG-UNIT TABS tablet one a day      LYRICA 150 MG capsule Place 150 mg into the right eye 2 times daily. 0     No current facility-administered medications on file prior to visit. Allergies   Allergen Reactions    Shellfish Allergy Anxiety, Hives, Itching, Shortness Of Breath and Swelling    Poison Ivy Extract      Health Maintenance   Topic Date Due    DTaP/Tdap/Td vaccine (1 - Tdap) Never done    Cervical cancer screen  Never done    Hepatitis B vaccine (3 of 3 - Risk 3-dose series) 06/28/2011    Colorectal Cancer Screen  Never done    Shingles vaccine (1 of 2) Never done    COVID-19 Vaccine (3 - Booster for Pfizer series) 08/05/2021    Diabetic retinal exam  04/05/2022    Diabetic Alb to Cr ratio (uACR) test  06/28/2022    Diabetic foot exam  07/02/2022    Flu vaccine (1) Never done    HIV screen  11/21/2028 (Originally 11/1/1985)    Lipids  06/17/2023    A1C test (Diabetic or Prediabetic)  10/20/2023    GFR test (Diabetes, CKD 3-4, OR last GFR 15-59)  10/20/2023    Depression Screen  01/26/2024    Breast cancer screen  01/26/2025    Pneumococcal 0-64 years Vaccine  Completed    Hepatitis C screen  Completed    Hepatitis A vaccine  Aged Out    Hib vaccine  Aged Out    Meningococcal (ACWY) vaccine  Aged Out       Review of Systems     Review of Systems   Constitutional:  Negative for activity change, appetite change, fatigue and fever. HENT:  Negative for congestion and rhinorrhea. Eyes: Negative. Respiratory: Negative. Negative for cough and chest tightness. Cardiovascular: Negative. Gastrointestinal: Negative. Endocrine: Negative. Genitourinary: Negative. Musculoskeletal: Negative. Skin: Negative. Neurological:  Negative for dizziness, light-headedness and numbness. Hematological: Negative. Psychiatric/Behavioral: Negative.        Physical Exam  Vitals:    02/02/23 0855   BP: 130/82   Pulse: (!) 102   Resp: 22   Temp: 97.9 °F (36.6 °C)   SpO2: 98%   Weight: (!) 354 lb (160.6 kg)   Height: 5' 6\" (1.676 m)       Physical Exam  Constitutional:       Appearance: She is well-developed. HENT:      Right Ear: External ear normal.      Left Ear: External ear normal.   Eyes:      Pupils: Pupils are equal, round, and reactive to light. Neck:      Thyroid: No thyromegaly. Cardiovascular:      Rate and Rhythm: Normal rate and regular rhythm. Heart sounds: Normal heart sounds. No murmur heard. No friction rub. No gallop. Pulmonary:      Effort: Pulmonary effort is normal. No respiratory distress. Breath sounds: No wheezing or rales. Chest:      Chest wall: No tenderness. Abdominal:      General: Bowel sounds are normal. There is no distension. Palpations: Abdomen is soft. There is no mass. Tenderness: There is no abdominal tenderness. There is no guarding or rebound. Musculoskeletal:         General: Normal range of motion. Cervical back: Normal range of motion and neck supple. Lymphadenopathy:      Cervical: No cervical adenopathy. Skin:     General: Skin is warm and dry. Neurological:      Mental Status: She is alert and oriented to person, place, and time. Cranial Nerves: No cranial nerve deficit. Coordination: Coordination normal.       Assessment   Diagnosis Orders   1. Anxiety        2. Panic attacks  ALPRAZolam (XANAX) 0.5 MG tablet        Problem List       Panic attacks    Relevant Medications    traZODone (DESYREL) 50 MG tablet    ALPRAZolam (XANAX) 0.5 MG tablet       Plan  No orders of the defined types were placed in this encounter. Orders Placed This Encounter   Medications    ALPRAZolam (XANAX) 0.5 MG tablet     Sig: TAKE 1 TABLET BY MOUTH THREE TIMES DAILY AS NEEDED for sleep or anxiety for up to 30 days     Dispense:  90 tablet     Refill:  2     No follow-ups on file.   Ferny Francis MD

## 2023-02-02 NOTE — PROGRESS NOTES
NEW PATIENT VISIT-PULMONARY/SLEEP    2/2/2023     REFERRING PHYSICIAN:  Meghana Hall MD     REASON FOR REFERRAL:  STEFANO    HPI:     Arlet Lizarraga is a 46 y.o. female who was referred to sleep and pulmonary clinic for evaluation. She underwent a sleep study due to concern of sleep apnea. Her  and Kids were noticing significant apneas in addition to severe hypersomnia. Her sleep study showed severe obstructive sleep apnea with an AHI of 98. She was not observed in rem sleep. She had significant desaturations. She tired and sleepy during the day and she does off easily.            Past Medical History   Past Medical History:   Diagnosis Date    Arthritis     right knee and ankle    Pilonidal cyst     Thyroid disease     hypothyroid    Type 1 diabetes ([facility] [address]        Past Surgical History  Past Surgical History:   Procedure Laterality Date    APPENDECTOMY      ARTHROSCOPY / ARTHROTOMY KNEE Right 11/8/2016    RIGHT KNEE ARTHROSCOPY / MEDIAL MENISCUS TEAR / SUPINE  performed by Samuel Mittal MD at [address] [place] [address]       Allergies  Allergies   Allergen Reactions    Shellfish Allergy Anxiety, Hives, Itching, Shortness Of Breath and Swelling    Poison Ivy Extract        Medications  Current Outpatient Medications   Medication Sig Dispense Refill    ALPRAZolam (XANAX) 0.5 MG tablet TAKE 1 TABLET BY MOUTH THREE TIMES DAILY AS NEEDED for sleep or anxiety for up to 30 days 90 tablet 2    levothyroxine (SYNTHROID) 125 MCG tablet TAKE 2 TABLETS DAILY 180 tablet 3    Insulin Syringe-Needle U-100 ([employer] INSULIN SYRINGE) 31G X 5/16\" 0.5 ML MISC 1 each by Does not apply route daily 300 each 3    insulin glargine (LANTUS) 100 UNIT/ML injection vial Indications: Pt to use as backup if insulin pump fails 40 units at bedtime 40 mL 3    QUEtiapine (SEROQUEL) 50 MG tablet TAKE 1 TABLET EVERY MORNINGAND TAKE 2 TABLETS EVERY   EVENING 270 tablet 3    lisinopril (PRINIVIL;ZESTRIL) 10 MG tablet TAKE 1 TABLET DAILY 90 tablet 3    pantoprazole (PROTONIX) 40 MG tablet TAKE 1 TABLET DAILY 90 tablet 3    HUMALOG 100 UNIT/ML SOLN injection vial INJECT PER INSULIN PUMP    MAXIMUM DOSE 100 UNITS PER DAY SUBCUTANEOUSLY 90 mL 3    traZODone (DESYREL) 50 MG tablet TAKE 3 TABLETS AT BEDTIME 270 tablet 3    topiramate (TOPAMAX) 25 MG tablet TAKE 1 TABLET BY MOUTH THREE TIMES DAILY 1 (ONE) hour before meals with a big glass OF water      diclofenac (VOLTAREN) 75 MG EC tablet TAKE 1 TABLET BY MOUTH TWICE DAILY AFTER MEALS      OXYGEN Discontinue Oxygen 1 Units 0    albuterol sulfate HFA (VENTOLIN HFA) 108 (90 Base) MCG/ACT inhaler Inhale 2 puffs into the lungs 4 times daily as needed for Wheezing 1 Inhaler 0    traMADol (ULTRAM ER) 100 MG extended release tablet Take 1 tablet by mouth every night as needed for pain      albuterol sulfate  (90 Base) MCG/ACT inhaler INHALE 2 PUFFS EVERY 4 TO 6 HOURS AS NEEDED  0    mometasone (ELOCON) 0.1 % cream Apply topically daily. 45 g 2    calcium carb-cholecalciferol 600-200 MG-UNIT TABS tablet one a day      LYRICA 150 MG capsule Place 150 mg into the right eye 2 times daily. 0     No current facility-administered medications for this visit. Social History  Social History     Tobacco Use    Smoking status: Never    Smokeless tobacco: Never   Substance Use Topics    Alcohol use: No       Family History  Family History   Problem Relation Age of Onset    Arthritis Mother     Heart Disease Father     High Blood Pressure Father     No Known Problems Daughter        Review of Systems  All review of systems has been obtained and negative other than what was mentionedin HPI. Physical Exam                 Vitals:    02/02/23 1001   BP: 128/76   Pulse: 100   Temp: 97.6 °F (36.4 °C)   TempSrc: Tympanic   SpO2: 97%   Weight: (!) 354 lb (160.6 kg)   Height: 5' 6\" (1.676 m)          General appearance: Obese, well appearing. No acute distress.  AAOX3  Head: Normocephalic, without obvious abnormality, atraumatic   Eyes:Pupils bilateral equal and reactive, EOM intact. Normal sclera and conjunctiva   Nose: Mucosa pink  Throat: Clear,  [place] 4  Neck: Supple, No JVD. Nothyromegaly. Neck is thick  Lungs: Diminished to auscultation bilaterally. No wheezing. No crackles. No use of accessory muscles. Heart: RRR, S1, S2 normal, no murmur, click, rub or gallop   Abdomen: soft, non-tender, nondistended. Bowel sounds normal. No hernia. No organomegaly. Extremities: extremities normal, atraumatic, no cyanosis, trace edema  Skin: Skin color, texture, turgor normal. No rashes or lesions   Neurological: No focal deficits,cranial nerves grossly intact. No weakness. Sensation normal   Psych: Normal Mood  Musculoskeletal: No joint abnormalities. Impression:   Diagnosis Orders   1. Obstructive sleep apnea  ECHO 2D WO Color Doppler Complete      2. Sleep related hypoxia        3. Obesity, unspecified classification, unspecified obesity type, unspecified whether serious comorbidity present        4. Shortness of breath  ECHO 2D WO Color Doppler Complete           Orders Placed This Encounter   Procedures    ECHO 2D WO Color Doppler Complete     Standing Status:   Future     Standing Expiration Date:   5/2/2023     Order Specific Question:   Reason for exam:     Answer:   pulmonary hypertension    Sleep Study with PAP Titration     Order Specific Question:   Sleep Study Titration Type     Answer:   CPAP          Recommendations:     - I went over results of sleep study with patient and her daughter in details and answered all their questions.  -She has a very severe case with significant desaturations. She will need CPAP titration study. I ordered today. She was advised that she will need to use the machine every night at least 4 hours.  -Her sleep-related hypoxia is likely due to her sleep apnea.   Obesity hypoventilation syndrome is also highly likely.  -No driving if sleepy or drowsy or otherwise impaired.  -Weight loss has been recommended. I offered referral to weight management center. He is not interested currently in weight loss surgery. - Shortness of breath is likely deconditioning and due to her weight. She might benefit from echocardiogram to assess cardiac function and pulm hypertension. Will order today. Return in about 3 months (around 5/2/2023).        Electronically signed by Radha Batres MD on 2/2/2023 at 11:13 AM

## 2023-03-15 ENCOUNTER — HOSPITAL ENCOUNTER (OUTPATIENT)
Dept: SLEEP CENTER | Age: 53
Discharge: HOME | End: 2023-03-17
Payer: COMMERCIAL

## 2023-03-15 PROCEDURE — 95811 POLYSOM 6/>YRS CPAP 4/> PARM: CPT

## 2023-03-28 DIAGNOSIS — E10.9 TYPE 1 DIABETES MELLITUS WITHOUT COMPLICATION (HCC): ICD-10-CM

## 2023-03-28 DIAGNOSIS — E03.9 ACQUIRED HYPOTHYROIDISM: ICD-10-CM

## 2023-03-28 LAB
ANION GAP SERPL CALCULATED.3IONS-SCNC: 13 MEQ/L (ref 9–15)
BUN SERPL-MCNC: 28 MG/DL (ref 6–20)
CALCIUM SERPL-MCNC: 9.3 MG/DL (ref 8.5–9.9)
CHLORIDE SERPL-SCNC: 109 MEQ/L (ref 95–107)
CO2 SERPL-SCNC: 24 MEQ/L (ref 20–31)
CREAT SERPL-MCNC: 1.54 MG/DL (ref 0.5–0.9)
GLUCOSE FASTING: 89 MG/DL (ref 70–99)
HBA1C MFR BLD: 7.3 % (ref 4.8–5.9)
POTASSIUM SERPL-SCNC: 4.3 MEQ/L (ref 3.4–4.9)
SODIUM SERPL-SCNC: 146 MEQ/L (ref 135–144)
T4 FREE SERPL-MCNC: 1.43 NG/DL (ref 0.84–1.68)
TSH REFLEX: 1.39 UIU/ML (ref 0.44–3.86)

## 2023-03-29 DIAGNOSIS — E10.9 TYPE 1 DIABETES MELLITUS WITHOUT COMPLICATION (HCC): ICD-10-CM

## 2023-03-29 LAB
CREAT UR-MCNC: 79.6 MG/DL
MICROALBUMIN UR-MCNC: <1.2 MG/DL
MICROALBUMIN/CREAT UR-RTO: NORMAL MG/G (ref 0–30)

## 2023-03-30 ENCOUNTER — OFFICE VISIT (OUTPATIENT)
Dept: ENDOCRINOLOGY | Age: 53
End: 2023-03-30
Payer: COMMERCIAL

## 2023-03-30 VITALS
SYSTOLIC BLOOD PRESSURE: 134 MMHG | DIASTOLIC BLOOD PRESSURE: 79 MMHG | BODY MASS INDEX: 47.09 KG/M2 | HEART RATE: 85 BPM | HEIGHT: 66 IN | WEIGHT: 293 LBS | OXYGEN SATURATION: 92 %

## 2023-03-30 DIAGNOSIS — N18.30 STAGE 3 CHRONIC KIDNEY DISEASE, UNSPECIFIED WHETHER STAGE 3A OR 3B CKD (HCC): ICD-10-CM

## 2023-03-30 DIAGNOSIS — E66.01 MORBID OBESITY (HCC): ICD-10-CM

## 2023-03-30 DIAGNOSIS — E03.9 ACQUIRED HYPOTHYROIDISM: ICD-10-CM

## 2023-03-30 DIAGNOSIS — E10.9 TYPE 1 DIABETES MELLITUS WITHOUT COMPLICATION (HCC): Primary | ICD-10-CM

## 2023-03-30 PROCEDURE — 99213 OFFICE O/P EST LOW 20 MIN: CPT | Performed by: INTERNAL MEDICINE

## 2023-03-30 PROCEDURE — 3051F HG A1C>EQUAL 7.0%<8.0%: CPT | Performed by: INTERNAL MEDICINE

## 2023-03-30 RX ORDER — INSULIN PEN,REUSABLE,BT LISPRO
INSULIN PEN (EA) SUBCUTANEOUS
COMMUNITY
Start: 2023-02-17

## 2023-03-30 RX ORDER — TRAMADOL HYDROCHLORIDE 50 MG/1
TABLET ORAL
COMMUNITY
Start: 2023-03-15 | End: 2023-03-30

## 2023-03-30 RX ORDER — TOPIRAMATE 50 MG/1
TABLET, FILM COATED ORAL
COMMUNITY
Start: 2023-02-02

## 2023-03-30 RX ORDER — INSULIN LISPRO 100 [IU]/ML
INJECTION, SOLUTION INTRAVENOUS; SUBCUTANEOUS
Qty: 5 EACH | Refills: 3 | Status: SHIPPED | OUTPATIENT
Start: 2023-03-30

## 2023-03-30 RX ORDER — PREGABALIN 100 MG/1
CAPSULE ORAL
COMMUNITY
Start: 2022-05-15

## 2023-03-30 ASSESSMENT — ENCOUNTER SYMPTOMS: EYES NEGATIVE: 1

## 2023-03-30 NOTE — PROGRESS NOTES
Spouse name: Not on file    Number of children: Not on file    Years of education: Not on file    Highest education level: Not on file   Occupational History    Not on file   Tobacco Use    Smoking status: Never    Smokeless tobacco: Never   Vaping Use    Vaping Use: Never used   Substance and Sexual Activity    Alcohol use: No    Drug use: No    Sexual activity: Yes   Other Topics Concern    Not on file   Social History Narrative    Not on file     Social Determinants of Health     Financial Resource Strain: Low Risk     Difficulty of Paying Living Expenses: Not hard at all   Food Insecurity: No Food Insecurity    Worried About [address] in the Last Year: Never true    [address] [employer] in the Last Year: Never true   Transportation Needs: Unknown    Lack of Transportation (Medical): Not on file    Lack of Transportation (Non-Medical):  No   Physical Activity: Not on file   Stress: Not on file   Social Connections: Not on file   Intimate Partner Violence: Not on file   Housing Stability: Unknown    Unable to Pay for Housing in the Last Year: Not on file    Number of Places Lived in the Last Year: Not on file    Unstable Housing in the Last Year: No     Family History   Problem Relation Age of Onset    Arthritis Mother     Heart Disease Father     High Blood Pressure Father     No Known Problems Daughter      Allergies   Allergen Reactions    Shellfish Allergy Anxiety, Hives, Itching, Shortness Of Breath and Swelling    Poison Ivy Extract        Current Outpatient Medications:     topiramate (TOPAMAX) 50 MG tablet, TAKE 1 TABLET BY MOUTH THREE TIMES DAILY 1 (ONE) hour BEFORE MEALS (TAKE with a big glass OF water), Disp: , Rfl:     pregabalin (LYRICA) 100 MG capsule, , Disp: , Rfl:     INPEN 100-BLUE-ROBLES-HUMALOG VON, , Disp: , Rfl:     ALPRAZolam (XANAX) 0.5 MG tablet, TAKE 1 TABLET BY MOUTH THREE TIMES DAILY AS NEEDED for sleep or anxiety for up to 30 days, Disp: 90 tablet, Rfl: 2

## 2023-04-21 ENCOUNTER — TELEPHONE (OUTPATIENT)
Dept: PULMONOLOGY | Age: 53
End: 2023-04-21

## 2023-05-24 ENCOUNTER — NURSE TRIAGE (OUTPATIENT)
Dept: OTHER | Facility: CLINIC | Age: 53
End: 2023-05-24

## 2023-05-24 NOTE — TELEPHONE ENCOUNTER
Location of patient: [place]    Received call from Nish Arnold at [facility] with [employer]. Subjective: Caller states \"right knee pain, numbness/tingling/weakness\"     Current Symptoms: see above, she has bad knees and legs, walks with a cane, she fell over her dog last Tuesday. Cannot bear weight well. Knee swelling, denies other symptoms    Onset: 9 days ago; worsening    Associated Symptoms: reduced activity    Pain Severity: 8/10; sharp; constant    Temperature: n/a     What has been tried: ice, elevation, meloxicam, tramadol    LMP: NA Pregnant: NA    Recommended disposition: Go to ED/UCC Now (Or to Office with PCP Approval) Writer called [place] . Care advice provided, patient verbalizes understanding; denies any other questions or concerns; instructed to call back for any new or worsening symptoms. Writer provided warm transfer to [place] at [facility] for further assistance    Attention Provider: Thank you for allowing me to participate in the care of your patient. The patient was connected to triage in response to information provided to the ECC/PSC. Please do not respond through this encounter as the response is not directed to a shared pool.       Reason for Disposition   Can't stand (bear weight) or walk    Protocols used: Knee Injury-ADULT-[place]

## 2023-05-25 ENCOUNTER — OFFICE VISIT (OUTPATIENT)
Dept: PULMONOLOGY | Age: 53
End: 2023-05-25
Payer: COMMERCIAL

## 2023-05-25 ENCOUNTER — OFFICE VISIT (OUTPATIENT)
Dept: FAMILY MEDICINE CLINIC | Age: 53
End: 2023-05-25
Payer: COMMERCIAL

## 2023-05-25 VITALS
HEART RATE: 78 BPM | OXYGEN SATURATION: 97 % | TEMPERATURE: 97.3 F | WEIGHT: 293 LBS | BODY MASS INDEX: 47.09 KG/M2 | RESPIRATION RATE: 13 BRPM | DIASTOLIC BLOOD PRESSURE: 60 MMHG | SYSTOLIC BLOOD PRESSURE: 132 MMHG | HEIGHT: 66 IN

## 2023-05-25 VITALS
HEART RATE: 80 BPM | HEIGHT: 66 IN | TEMPERATURE: 97.8 F | DIASTOLIC BLOOD PRESSURE: 72 MMHG | WEIGHT: 293 LBS | OXYGEN SATURATION: 92 % | SYSTOLIC BLOOD PRESSURE: 126 MMHG | BODY MASS INDEX: 47.09 KG/M2

## 2023-05-25 DIAGNOSIS — M25.561 ACUTE PAIN OF RIGHT KNEE: Primary | ICD-10-CM

## 2023-05-25 DIAGNOSIS — Z99.89 OSA ON CPAP: Primary | ICD-10-CM

## 2023-05-25 DIAGNOSIS — G47.10 HYPERSOMNIA: ICD-10-CM

## 2023-05-25 DIAGNOSIS — G47.33 OSA ON CPAP: Primary | ICD-10-CM

## 2023-05-25 DIAGNOSIS — Z91.81 HISTORY OF RECENT FALL: ICD-10-CM

## 2023-05-25 DIAGNOSIS — E66.9 OBESITY, UNSPECIFIED CLASSIFICATION, UNSPECIFIED OBESITY TYPE, UNSPECIFIED WHETHER SERIOUS COMORBIDITY PRESENT: ICD-10-CM

## 2023-05-25 DIAGNOSIS — M17.11 ARTHRITIS OF RIGHT KNEE: Primary | ICD-10-CM

## 2023-05-25 PROCEDURE — 99213 OFFICE O/P EST LOW 20 MIN: CPT | Performed by: INTERNAL MEDICINE

## 2023-05-25 PROCEDURE — 99214 OFFICE O/P EST MOD 30 MIN: CPT | Performed by: NURSE PRACTITIONER

## 2023-05-25 ASSESSMENT — ENCOUNTER SYMPTOMS
RESPIRATORY NEGATIVE: 1
WHEEZING: 0
COUGH: 0
SHORTNESS OF BREATH: 0
BLOOD IN STOOL: 0

## 2023-05-25 NOTE — PROGRESS NOTES
hour BEFORE MEALS (TAKE with a big glass OF water) 2/2/23  Yes Historical Provider, MD   pregabalin (LYRICA) 100 MG capsule  5/15/22  Yes Historical Provider, MD   INPEN 100-BLUE-ROBLES-HUMALOG [address]  2/17/23  Yes Historical Provider, MD   insulin lispro (HUMALOG) 100 UNIT/ML injection cartridge 20 -30 units at each meals if pump not used 3/30/23  Yes Steve Hagen MD   levothyroxine (SYNTHROID) 125 MCG tablet TAKE 2 TABLETS DAILY 11/21/22  Yes Steve Hagen MD   Insulin Syringe-Needle U-100 ([employer] INSULIN SYRINGE) 31G X 5/16\" 0.5 ML MISC 1 each by Does not apply route daily 10/25/22  Yes Steve Hagen MD   insulin glargine (LANTUS) 100 UNIT/ML injection vial Indications: Pt to use as backup if insulin pump fails 40 units at bedtime 10/25/22  Yes Steve Hagen MD   QUEtiapine (SEROQUEL) 50 MG tablet TAKE 1 TABLET EVERY MORNINGAND TAKE 2 TABLETS EVERY   EVENING 8/4/22  Yes Josy Alvarado MD   lisinopril (PRINIVIL;ZESTRIL) 10 MG tablet TAKE 1 TABLET DAILY 8/4/22  Yes Josy Alvarado MD   pantoprazole (PROTONIX) 40 MG tablet TAKE 1 TABLET DAILY 8/4/22  Yes Josy Alvarado MD   HUMALOG 100 UNIT/ML SOLN injection vial INJECT PER INSULIN PUMP    MAXIMUM DOSE 100 UNITS PER DAY SUBCUTANEOUSLY 8/4/22  Yes TOMY Mireles   traZODone (DESYREL) 50 MG tablet TAKE 3 TABLETS AT BEDTIME 8/4/22  Yes Josy Alvarado MD   OXYGEN Discontinue Oxygen 8/24/21  Yes Arline Jimenez MD   albuterol sulfate HFA (VENTOLIN HFA) 108 (90 Base) MCG/ACT inhaler Inhale 2 puffs into the lungs 4 times daily as needed for Wheezing 5/21/21  Yes Arline Jimenez MD   traMADol [facility]) 100 MG extended release tablet Take 1 tablet by mouth every night as needed for pain 1/18/21  Yes Historical Provider, MD   albuterol sulfate  (90 Base) MCG/ACT inhaler INHALE 2 PUFFS EVERY 4 TO 6 HOURS AS NEEDED 7/3/19  Yes Historical Provider, MD   mometasone (ELOCON) 0.1 % cream Apply topically daily.  9/10/18  Yes Josy Alvarado MD   calcium carb-cholecalciferol

## 2023-05-25 NOTE — PROGRESS NOTES
Alcohol use: No       Family History  Family History   Problem Relation Age of Onset    Arthritis Mother     Heart Disease Father     High Blood Pressure Father     No Known Problems Daughter        Review of Systems  All review of systems has been obtained and negative other than what was mentionedin HPI. Physical Exam                 Vitals:    05/25/23 0925   BP: 126/72   Pulse: 80   Temp: 97.8 °F (36.6 °C)   TempSrc: Tympanic   SpO2: 92%   Weight: (!) 359 lb (162.8 kg)   Height: 5' 6\" (1.676 m)          General appearance: Obese, well appearing. No acute distress. AAOX3  Head: Normocephalic, without obvious abnormality, atraumatic   Eyes:Pupils bilateral equal and reactive, EOM intact. Normal sclera and conjunctiva   Nose: Mucosa pink  Throat: Clear,  [place] 4  Neck: Supple, No JVD. Nothyromegaly. Neck is thick  Lungs: Diminished to auscultation bilaterally. No wheezing. No crackles. No use of accessory muscles. Heart: RRR, S1, S2 normal, no murmur, click, rub or gallop   Abdomen: soft, non-tender, nondistended. Bowel sounds normal. No hernia. No organomegaly. Extremities: extremities normal, atraumatic, no cyanosis, trace edema  Skin: Skin color, texture, turgor normal. No rashes or lesions   Neurological: No focal deficits,cranial nerves grossly intact. No weakness. Sensation normal   Psych: Normal Mood  Musculoskeletal: No joint abnormalities. Impression:   Diagnosis Orders   1. STEFANO on CPAP        2. Obesity, unspecified classification, unspecified obesity type, unspecified whether serious comorbidity present        3. Hypersomnia               Recommendations:     - I went over compliance data with patient in details and answered all her questions.  -Her compliance is great. She has no residual events.  -Continue using CPAP at the same pressure with the same compliance.  -He should have supplies for the rest of the year.  -Discussed results of echocardiogram with her.   No evidence of

## 2023-07-18 ENCOUNTER — OFFICE VISIT (OUTPATIENT)
Dept: FAMILY MEDICINE CLINIC | Age: 53
End: 2023-07-18
Payer: COMMERCIAL

## 2023-07-18 VITALS
TEMPERATURE: 97.6 F | WEIGHT: 293 LBS | HEIGHT: 66 IN | BODY MASS INDEX: 47.09 KG/M2 | OXYGEN SATURATION: 95 % | HEART RATE: 79 BPM | DIASTOLIC BLOOD PRESSURE: 70 MMHG | RESPIRATION RATE: 18 BRPM | SYSTOLIC BLOOD PRESSURE: 132 MMHG

## 2023-07-18 DIAGNOSIS — F41.0 PANIC ATTACKS: Primary | ICD-10-CM

## 2023-07-18 DIAGNOSIS — E03.9 ACQUIRED HYPOTHYROIDISM: ICD-10-CM

## 2023-07-18 DIAGNOSIS — F41.9 ANXIETY: ICD-10-CM

## 2023-07-18 DIAGNOSIS — E10.9 TYPE 1 DIABETES MELLITUS WITHOUT COMPLICATION (HCC): ICD-10-CM

## 2023-07-18 PROCEDURE — 99214 OFFICE O/P EST MOD 30 MIN: CPT | Performed by: FAMILY MEDICINE

## 2023-07-18 PROCEDURE — 3051F HG A1C>EQUAL 7.0%<8.0%: CPT | Performed by: FAMILY MEDICINE

## 2023-07-18 ASSESSMENT — ENCOUNTER SYMPTOMS
CHEST TIGHTNESS: 0
RHINORRHEA: 0
EYES NEGATIVE: 1
GASTROINTESTINAL NEGATIVE: 1
RESPIRATORY NEGATIVE: 1
COUGH: 0

## 2023-07-18 NOTE — PROGRESS NOTES
Patient is seen in follow up for   Chief Complaint   Patient presents with    3 Month Follow-Up    Anxiety     xanax    Hypertension     Anxiety  Presents for follow-up visit. Patient reports no dizziness. Symptoms occur constantly. The severity of symptoms is moderate. The quality of sleep is fair. Hypertension  This is a chronic problem. The current episode started more than 1 year ago. The problem is unchanged. The problem is controlled. Associated symptoms include anxiety. There are no associated agents to hypertension. Risk factors for coronary artery disease include diabetes mellitus. Past treatments include ACE inhibitors. The current treatment provides moderate improvement. There are no compliance problems.       Past Medical History:   Diagnosis Date    Arthritis     right knee and ankle    Pilonidal cyst     Thyroid disease     hypothyroid    Type 1 diabetes [facility])      Patient Active Problem List    Diagnosis Date Noted    STEFANO (obstructive sleep apnea) 01/28/2023    Bronchitis 08/17/2021    Dyspnea 08/17/2021    Hypoxia 05/21/2021    COVID-19 virus infection     Respiratory failure ([address]) 04/12/2021    Brow ptosis 01/22/2016    Long-term insulin use ([address]) 01/22/2016    Nuclear sclerotic cataract of both eyes 01/22/2016    Background retinopathy due to secondary diabetes ([address]) 10/16/2014    Age related cataract 10/16/2014    Type 1 diabetes mellitus ([address]) 07/02/2014    Obesity 08/18/2013    Panic attacks 11/29/2012    Pilonidal cyst     Vitamin D deficiency 03/28/2011    Chronic lymphocytic thyroiditis 11/05/2002    Acquired hypothyroidism 11/05/2002     Past Surgical History:   Procedure Laterality Date    APPENDECTOMY      ARTHROSCOPY / ARTHROTOMY KNEE Right 11/8/2016    RIGHT KNEE ARTHROSCOPY / MEDIAL MENISCUS TEAR / SUPINE  performed by Freddy Hammond MD at [address] [place] [address]  97-02     Family History   Problem Relation Age of Onset    Arthritis Mother     Heart Disease Father     High

## 2023-07-25 DIAGNOSIS — E10.9 TYPE 1 DIABETES MELLITUS WITHOUT COMPLICATION (HCC): ICD-10-CM

## 2023-07-25 DIAGNOSIS — E03.9 ACQUIRED HYPOTHYROIDISM: ICD-10-CM

## 2023-07-25 LAB
ANION GAP SERPL CALCULATED.3IONS-SCNC: 11 MEQ/L (ref 9–15)
BUN SERPL-MCNC: 21 MG/DL (ref 6–20)
CALCIUM SERPL-MCNC: 8.8 MG/DL (ref 8.5–9.9)
CHLORIDE SERPL-SCNC: 108 MEQ/L (ref 95–107)
CO2 SERPL-SCNC: 24 MEQ/L (ref 20–31)
CREAT SERPL-MCNC: 1.33 MG/DL (ref 0.5–0.9)
GLUCOSE SERPL-MCNC: 93 MG/DL (ref 70–99)
HBA1C MFR BLD: 7.5 % (ref 4.8–5.9)
POTASSIUM SERPL-SCNC: 4.8 MEQ/L (ref 3.4–4.9)
SODIUM SERPL-SCNC: 143 MEQ/L (ref 135–144)
T4 FREE SERPL-MCNC: 1.55 NG/DL (ref 0.84–1.68)
TSH SERPL-MCNC: 0.37 UIU/ML (ref 0.44–3.86)

## 2023-07-26 ENCOUNTER — OFFICE VISIT (OUTPATIENT)
Dept: ENDOCRINOLOGY | Age: 53
End: 2023-07-26

## 2023-07-26 VITALS
OXYGEN SATURATION: 94 % | WEIGHT: 293 LBS | HEIGHT: 66 IN | HEART RATE: 58 BPM | SYSTOLIC BLOOD PRESSURE: 110 MMHG | DIASTOLIC BLOOD PRESSURE: 64 MMHG | BODY MASS INDEX: 47.09 KG/M2

## 2023-07-26 DIAGNOSIS — E03.9 ACQUIRED HYPOTHYROIDISM: ICD-10-CM

## 2023-07-26 DIAGNOSIS — N18.30 STAGE 3 CHRONIC KIDNEY DISEASE, UNSPECIFIED WHETHER STAGE 3A OR 3B CKD (HCC): ICD-10-CM

## 2023-07-26 DIAGNOSIS — E10.9 TYPE 1 DIABETES MELLITUS WITHOUT COMPLICATION (HCC): Primary | ICD-10-CM

## 2023-07-26 RX ORDER — TRAMADOL HYDROCHLORIDE 50 MG/1
50 TABLET ORAL 2 TIMES DAILY PRN
COMMUNITY
Start: 2023-07-11

## 2023-08-23 RX ORDER — LISINOPRIL 10 MG/1
TABLET ORAL
Qty: 90 TABLET | Refills: 3 | Status: SHIPPED | OUTPATIENT
Start: 2023-08-23

## 2023-08-23 RX ORDER — QUETIAPINE FUMARATE 50 MG/1
TABLET, FILM COATED ORAL
Qty: 270 TABLET | Refills: 3 | Status: SHIPPED | OUTPATIENT
Start: 2023-08-23

## 2023-08-23 RX ORDER — INSULIN LISPRO 100 [IU]/ML
INJECTION, SOLUTION INTRAVENOUS; SUBCUTANEOUS
Qty: 90 ML | Refills: 3 | Status: SHIPPED | OUTPATIENT
Start: 2023-08-23

## 2023-08-23 RX ORDER — TRAZODONE HYDROCHLORIDE 50 MG/1
TABLET ORAL
Qty: 270 TABLET | Refills: 3 | Status: SHIPPED | OUTPATIENT
Start: 2023-08-23

## 2023-08-23 RX ORDER — PANTOPRAZOLE SODIUM 40 MG/1
TABLET, DELAYED RELEASE ORAL
Qty: 90 TABLET | Refills: 3 | Status: SHIPPED | OUTPATIENT
Start: 2023-08-23

## 2023-11-28 RX ORDER — LEVOTHYROXINE SODIUM 0.12 MG/1
TABLET ORAL
Qty: 180 TABLET | Refills: 3 | Status: SHIPPED | OUTPATIENT
Start: 2023-11-28

## 2023-11-29 ENCOUNTER — OFFICE VISIT (OUTPATIENT)
Dept: FAMILY MEDICINE CLINIC | Age: 53
End: 2023-11-29
Payer: COMMERCIAL

## 2023-11-29 VITALS
SYSTOLIC BLOOD PRESSURE: 128 MMHG | RESPIRATION RATE: 19 BRPM | DIASTOLIC BLOOD PRESSURE: 74 MMHG | OXYGEN SATURATION: 97 % | HEART RATE: 79 BPM

## 2023-11-29 DIAGNOSIS — D68.69 OTHER THROMBOPHILIA (HCC): Primary | ICD-10-CM

## 2023-11-29 DIAGNOSIS — J40 BRONCHITIS: ICD-10-CM

## 2023-11-29 DIAGNOSIS — F41.0 PANIC ATTACKS: ICD-10-CM

## 2023-11-29 PROCEDURE — 99213 OFFICE O/P EST LOW 20 MIN: CPT | Performed by: FAMILY MEDICINE

## 2023-11-29 PROCEDURE — 90674 CCIIV4 VAC NO PRSV 0.5 ML IM: CPT | Performed by: FAMILY MEDICINE

## 2023-11-29 PROCEDURE — 90471 IMMUNIZATION ADMIN: CPT | Performed by: FAMILY MEDICINE

## 2023-11-29 RX ORDER — AZITHROMYCIN 250 MG/1
250 TABLET, FILM COATED ORAL SEE ADMIN INSTRUCTIONS
Qty: 6 TABLET | Refills: 0 | Status: SHIPPED | OUTPATIENT
Start: 2023-11-29 | End: 2023-12-04

## 2023-11-29 RX ORDER — ALPRAZOLAM 0.5 MG/1
TABLET ORAL
Qty: 90 TABLET | Refills: 2 | Status: SHIPPED | OUTPATIENT
Start: 2023-11-29 | End: 2024-02-29

## 2023-11-29 ASSESSMENT — ENCOUNTER SYMPTOMS
CHEST TIGHTNESS: 0
EYES NEGATIVE: 1
GASTROINTESTINAL NEGATIVE: 1
RHINORRHEA: 0
RESPIRATORY NEGATIVE: 1
COUGH: 0

## 2023-11-29 NOTE — PROGRESS NOTES
Skin: Negative. Neurological:  Negative for dizziness, light-headedness and numbness. Hematological: Negative. Psychiatric/Behavioral: Negative. Physical Exam  Vitals:    11/29/23 1012   BP: 128/74   Pulse: 79   Resp: 19   SpO2: 97%       Physical Exam  Constitutional:       Appearance: She is well-developed. HENT:      Right Ear: External ear normal.      Left Ear: External ear normal.   Eyes:      Pupils: Pupils are equal, round, and reactive to light. Neck:      Thyroid: No thyromegaly. Cardiovascular:      Rate and Rhythm: Normal rate and regular rhythm. Heart sounds: Normal heart sounds. No murmur heard. No friction rub. No gallop. Pulmonary:      Effort: Pulmonary effort is normal. No respiratory distress. Breath sounds: No wheezing or rales. Chest:      Chest wall: No tenderness. Abdominal:      General: Bowel sounds are normal. There is no distension. Palpations: Abdomen is soft. There is no mass. Tenderness: There is no abdominal tenderness. There is no guarding or rebound. Musculoskeletal:         General: Normal range of motion. Cervical back: Normal range of motion and neck supple. Lymphadenopathy:      Cervical: No cervical adenopathy. Skin:     General: Skin is warm and dry. Neurological:      Mental Status: She is alert and oriented to person, place, and time. Cranial Nerves: No cranial nerve deficit. Coordination: Coordination normal.         Assessment   Diagnosis Orders   1. Other thrombophilia ([address])        2. Panic attacks  ALPRAZolam (XANAX) 0.5 MG tablet      3.  Bronchitis          Problem List       Panic attacks    Relevant Medications    traZODone (DESYREL) 50 MG tablet    ALPRAZolam (XANAX) 0.5 MG tablet    Bronchitis    Other thrombophilia ([address]) - Primary       Plan  Orders Placed This Encounter   Procedures    Influenza, FLUCELVAX, (age 10 mo+), IM, Preservative Free, 0.5 mL     Orders Placed This Encounter

## 2023-12-08 DIAGNOSIS — E03.9 ACQUIRED HYPOTHYROIDISM: ICD-10-CM

## 2023-12-08 DIAGNOSIS — E10.9 TYPE 1 DIABETES MELLITUS WITHOUT COMPLICATION (HCC): ICD-10-CM

## 2023-12-08 LAB
ANION GAP SERPL CALCULATED.3IONS-SCNC: 11 MEQ/L (ref 9–15)
BUN SERPL-MCNC: 20 MG/DL (ref 6–20)
CALCIUM SERPL-MCNC: 9.3 MG/DL (ref 8.5–9.9)
CHLORIDE SERPL-SCNC: 107 MEQ/L (ref 95–107)
CHOLEST SERPL-MCNC: 179 MG/DL (ref 0–199)
CO2 SERPL-SCNC: 22 MEQ/L (ref 20–31)
CREAT SERPL-MCNC: 1.2 MG/DL (ref 0.5–0.9)
GLUCOSE SERPL-MCNC: 104 MG/DL (ref 70–99)
HBA1C MFR BLD: 6.5 % (ref 4.8–5.9)
HDLC SERPL-MCNC: 55 MG/DL (ref 40–59)
LDLC SERPL CALC-MCNC: 104 MG/DL (ref 0–129)
POTASSIUM SERPL-SCNC: 4.4 MEQ/L (ref 3.4–4.9)
SODIUM SERPL-SCNC: 140 MEQ/L (ref 135–144)
T4 FREE SERPL-MCNC: 1.62 NG/DL (ref 0.84–1.68)
TRIGL SERPL-MCNC: 101 MG/DL (ref 0–150)
TSH SERPL-MCNC: 0.61 UIU/ML (ref 0.44–3.86)

## 2023-12-13 ENCOUNTER — OFFICE VISIT (OUTPATIENT)
Dept: ENDOCRINOLOGY | Age: 53
End: 2023-12-13
Payer: COMMERCIAL

## 2023-12-13 VITALS
BODY MASS INDEX: 47.09 KG/M2 | WEIGHT: 293 LBS | OXYGEN SATURATION: 99 % | DIASTOLIC BLOOD PRESSURE: 69 MMHG | HEIGHT: 66 IN | HEART RATE: 89 BPM | TEMPERATURE: 96.9 F | SYSTOLIC BLOOD PRESSURE: 147 MMHG

## 2023-12-13 DIAGNOSIS — E03.9 ACQUIRED HYPOTHYROIDISM: ICD-10-CM

## 2023-12-13 DIAGNOSIS — N18.30 STAGE 3 CHRONIC KIDNEY DISEASE, UNSPECIFIED WHETHER STAGE 3A OR 3B CKD (HCC): ICD-10-CM

## 2023-12-13 DIAGNOSIS — Z96.41 INSULIN PUMP IN PLACE: ICD-10-CM

## 2023-12-13 DIAGNOSIS — E10.9 TYPE 1 DIABETES MELLITUS WITHOUT COMPLICATION (HCC): Primary | ICD-10-CM

## 2023-12-13 LAB
CHP ED QC CHECK: NORMAL
GLUCOSE BLD-MCNC: NORMAL MG/DL

## 2023-12-13 PROCEDURE — 82962 GLUCOSE BLOOD TEST: CPT | Performed by: INTERNAL MEDICINE

## 2023-12-13 PROCEDURE — 99214 OFFICE O/P EST MOD 30 MIN: CPT | Performed by: INTERNAL MEDICINE

## 2023-12-13 PROCEDURE — 3044F HG A1C LEVEL LT 7.0%: CPT | Performed by: INTERNAL MEDICINE

## 2023-12-13 RX ORDER — DICLOFENAC SODIUM 75 MG/1
TABLET, DELAYED RELEASE ORAL
COMMUNITY
Start: 2023-12-09

## 2024-05-10 DIAGNOSIS — E10.9 TYPE 1 DIABETES MELLITUS WITHOUT COMPLICATION (HCC): ICD-10-CM

## 2024-05-10 DIAGNOSIS — E03.9 ACQUIRED HYPOTHYROIDISM: ICD-10-CM

## 2024-05-10 LAB
ANION GAP SERPL CALCULATED.3IONS-SCNC: 14 MEQ/L (ref 9–15)
BUN SERPL-MCNC: 23 MG/DL (ref 6–20)
CALCIUM SERPL-MCNC: 9.1 MG/DL (ref 8.5–9.9)
CHLORIDE SERPL-SCNC: 109 MEQ/L (ref 95–107)
CO2 SERPL-SCNC: 21 MEQ/L (ref 20–31)
CREAT SERPL-MCNC: 1.3 MG/DL (ref 0.5–0.9)
GLUCOSE SERPL-MCNC: 110 MG/DL (ref 70–99)
POTASSIUM SERPL-SCNC: 5.3 MEQ/L (ref 3.4–4.9)
SODIUM SERPL-SCNC: 144 MEQ/L (ref 135–144)
T4 FREE SERPL-MCNC: 1.53 NG/DL (ref 0.84–1.68)
TSH REFLEX: 0.84 UIU/ML (ref 0.44–3.86)

## 2024-05-11 LAB
ESTIMATED AVERAGE GLUCOSE: 128 MG/DL
HBA1C MFR BLD: 6.1 % (ref 4–6)

## 2024-05-16 ENCOUNTER — OFFICE VISIT (OUTPATIENT)
Dept: PULMONOLOGY | Age: 54
End: 2024-05-16
Payer: COMMERCIAL

## 2024-05-16 ENCOUNTER — OFFICE VISIT (OUTPATIENT)
Dept: ENDOCRINOLOGY | Age: 54
End: 2024-05-16
Payer: COMMERCIAL

## 2024-05-16 VITALS
DIASTOLIC BLOOD PRESSURE: 69 MMHG | BODY MASS INDEX: 56.65 KG/M2 | SYSTOLIC BLOOD PRESSURE: 112 MMHG | WEIGHT: 293 LBS | HEART RATE: 88 BPM | OXYGEN SATURATION: 95 %

## 2024-05-16 VITALS
TEMPERATURE: 97.3 F | BODY MASS INDEX: 47.09 KG/M2 | HEART RATE: 88 BPM | OXYGEN SATURATION: 94 % | WEIGHT: 293 LBS | HEIGHT: 66 IN | SYSTOLIC BLOOD PRESSURE: 112 MMHG | DIASTOLIC BLOOD PRESSURE: 70 MMHG

## 2024-05-16 DIAGNOSIS — Z96.41 INSULIN PUMP IN PLACE: ICD-10-CM

## 2024-05-16 DIAGNOSIS — N18.30 STAGE 3 CHRONIC KIDNEY DISEASE, UNSPECIFIED WHETHER STAGE 3A OR 3B CKD (HCC): ICD-10-CM

## 2024-05-16 DIAGNOSIS — E03.9 ACQUIRED HYPOTHYROIDISM: ICD-10-CM

## 2024-05-16 DIAGNOSIS — G47.33 OSA ON CPAP: Primary | ICD-10-CM

## 2024-05-16 DIAGNOSIS — E66.9 OBESITY, UNSPECIFIED CLASSIFICATION, UNSPECIFIED OBESITY TYPE, UNSPECIFIED WHETHER SERIOUS COMORBIDITY PRESENT: ICD-10-CM

## 2024-05-16 DIAGNOSIS — R06.02 SHORTNESS OF BREATH: ICD-10-CM

## 2024-05-16 DIAGNOSIS — E10.9 TYPE 1 DIABETES MELLITUS WITHOUT COMPLICATION (HCC): Primary | ICD-10-CM

## 2024-05-16 PROCEDURE — 99213 OFFICE O/P EST LOW 20 MIN: CPT | Performed by: INTERNAL MEDICINE

## 2024-05-16 PROCEDURE — 99214 OFFICE O/P EST MOD 30 MIN: CPT | Performed by: INTERNAL MEDICINE

## 2024-05-16 PROCEDURE — 3044F HG A1C LEVEL LT 7.0%: CPT | Performed by: INTERNAL MEDICINE

## 2024-05-16 NOTE — PROGRESS NOTES
(PRINIVIL;ZESTRIL) 10 MG tablet, TAKE 1 TABLET DAILY, Disp: 90 tablet, Rfl: 3    HUMALOG 100 UNIT/ML SOLN injection vial, INJECT PER INSULIN PUMP    MAXIMUM DOSE 100 UNITS PER DAY SUBCUTANEOUSLY, Disp: 90 mL, Rfl: 3    traMADol (ULTRAM) 50 MG tablet, Take 1 tablet by mouth 2 times daily as needed., Disp: , Rfl:     topiramate (TOPAMAX) 50 MG tablet, TAKE 1 TABLET BY MOUTH THREE TIMES DAILY 1 (ONE) hour BEFORE MEALS (TAKE with a big glass OF water), Disp: , Rfl:     pregabalin (LYRICA) 100 MG capsule, , Disp: , Rfl:     INPEN 100-BLUE-ROBLES-HUMALOG VON, , Disp: , Rfl:     Insulin Syringe-Needle U-100 ([employer] INSULIN SYRINGE) 31G X 5/16\" 0.5 ML MISC, 1 each by Does not apply route daily, Disp: 300 each, Rfl: 3    OXYGEN, Discontinue Oxygen, Disp: 1 Units, Rfl: 0    albuterol sulfate HFA (VENTOLIN HFA) 108 (90 Base) MCG/ACT inhaler, Inhale 2 puffs into the lungs 4 times daily as needed for Wheezing, Disp: 1 Inhaler, Rfl: 0    albuterol sulfate  (90 Base) MCG/ACT inhaler, INHALE 2 PUFFS EVERY 4 TO 6 HOURS AS NEEDED, Disp: , Rfl: 0    mometasone (ELOCON) 0.1 % cream, Apply topically daily., Disp: 45 g, Rfl: 2    calcium carb-cholecalciferol 600-200 MG-UNIT TABS tablet, one a day, Disp: , Rfl:     ALPRAZolam (XANAX) 0.5 MG tablet, TAKE 1 TABLET BY MOUTH THREE TIMES DAILY AS NEEDED for sleep or anxiety for up to 30 days, Disp: 90 tablet, Rfl: 2  Lab Results   Component Value Date     05/10/2024    K 5.3 (H) 05/10/2024     (H) 05/10/2024    CO2 21 05/10/2024    BUN 23 (H) 05/10/2024    CREATININE 1.30 (H) 05/10/2024    GLUCOSE 110 (H) 05/10/2024    CALCIUM 9.1 05/10/2024    BILITOT 0.4 04/17/2021    ALKPHOS 117 04/17/2021    AST 37 (H) 04/17/2021    ALT 41 (H) 04/17/2021    LABGLOM 49.0 (L) 05/10/2024    GFRAA 45.2 (L) 06/17/2022    GLOB 3.5 04/17/2021     Lab Results   Component Value Date    WBC 7.9 04/19/2021    HGB 12.0 04/19/2021    HCT 38.0 04/19/2021    MCV 74.2 (L) 04/19/2021

## 2024-05-16 NOTE — PROGRESS NOTES
PATIENT VISIT-PULMONARY/SLEEP    2024     REFERRING PHYSICIAN:  Adria Quintanilla MD     REASON FOR REFERRAL:  STEFANO    HPI:     Abby Bertrand is a 53 y.o. female who was referred to sleep and pulmonary clinic for evaluation.   She underwent a sleep study due to concern of sleep apnea.  Her  and Kids were noticing significant apneas in addition to severe hypersomnia.  Her sleep study showed severe obstructive sleep apnea with an AHI of 98.  She was not observed in rem sleep.  She had significant desaturations.  She tired and sleepy during the day and she does off easily.         23:    She comes for follow-up.  She underwent CPAP titration study which showed a pressure of 15 cm H2O was adequate.  We will prescribe CPAP for her.  She has received it and has been using it for about a months.  Compliance is great.  He is averaging about 7 hours every night.  AHI is normal on the machine.  Leak is minimal.  She also underwent an echocardiogram and there was no evidence of pulm hypertension.      24:    Follow-up.  Has been doing well since last year.  Continues to be compliant with the machine.  Averaging about 4 to 6 hours every night.  AHI is normal on the machine.  No significant leak.  Weight has been about the same.    Past Medical History   Past Medical History:   Diagnosis Date    Arthritis     right knee and ankle    Pilonidal cyst     Thyroid disease     hypothyroid    Type 1 diabetes (HCC)        Past Surgical History  Past Surgical History:   Procedure Laterality Date    APPENDECTOMY      ARTHROSCOPY / ARTHROTOMY KNEE Right 2016    RIGHT KNEE ARTHROSCOPY / MEDIAL MENISCUS TEAR / SUPINE  performed by Stephane Wilson MD at [facility] OR     SECTION  97-02       Allergies  Allergies   Allergen Reactions    Shellfish Allergy Anxiety, Hives, Itching, Shortness Of Breath and Swelling    Poison Ivy Extract        Medications  Current Outpatient Medications   Medication Sig

## 2024-07-22 ASSESSMENT — PATIENT HEALTH QUESTIONNAIRE - PHQ9
SUM OF ALL RESPONSES TO PHQ9 QUESTIONS 1 & 2: 1
2. FEELING DOWN, DEPRESSED OR HOPELESS: NOT AT ALL
1. LITTLE INTEREST OR PLEASURE IN DOING THINGS: SEVERAL DAYS

## 2024-07-25 ENCOUNTER — OFFICE VISIT (OUTPATIENT)
Dept: FAMILY MEDICINE CLINIC | Age: 54
End: 2024-07-25
Payer: COMMERCIAL

## 2024-07-25 VITALS
OXYGEN SATURATION: 97 % | RESPIRATION RATE: 19 BRPM | BODY MASS INDEX: 56.03 KG/M2 | WEIGHT: 293 LBS | HEART RATE: 84 BPM | SYSTOLIC BLOOD PRESSURE: 132 MMHG | DIASTOLIC BLOOD PRESSURE: 78 MMHG

## 2024-07-25 DIAGNOSIS — J96.90 RESPIRATORY FAILURE, UNSPECIFIED CHRONICITY, UNSPECIFIED WHETHER WITH HYPOXIA OR HYPERCAPNIA (HCC): ICD-10-CM

## 2024-07-25 DIAGNOSIS — N18.32 TYPE 1 DIABETES MELLITUS WITH STAGE 3B CHRONIC KIDNEY DISEASE (HCC): ICD-10-CM

## 2024-07-25 DIAGNOSIS — E10.22 TYPE 1 DIABETES MELLITUS WITH STAGE 3B CHRONIC KIDNEY DISEASE (HCC): ICD-10-CM

## 2024-07-25 DIAGNOSIS — F41.9 ANXIETY: ICD-10-CM

## 2024-07-25 DIAGNOSIS — F32.A DEPRESSION, UNSPECIFIED DEPRESSION TYPE: ICD-10-CM

## 2024-07-25 DIAGNOSIS — D68.69 OTHER THROMBOPHILIA (HCC): ICD-10-CM

## 2024-07-25 DIAGNOSIS — F41.0 PANIC ATTACKS: Primary | ICD-10-CM

## 2024-07-25 PROCEDURE — 3044F HG A1C LEVEL LT 7.0%: CPT | Performed by: FAMILY MEDICINE

## 2024-07-25 PROCEDURE — 99214 OFFICE O/P EST MOD 30 MIN: CPT | Performed by: FAMILY MEDICINE

## 2024-07-25 RX ORDER — QUETIAPINE FUMARATE 100 MG/1
100 TABLET, FILM COATED ORAL 3 TIMES DAILY
Qty: 90 TABLET | Refills: 5 | Status: SHIPPED | OUTPATIENT
Start: 2024-07-25

## 2024-07-25 RX ORDER — TRAZODONE HYDROCHLORIDE 50 MG/1
TABLET ORAL
Qty: 270 TABLET | Refills: 3 | Status: SHIPPED | OUTPATIENT
Start: 2024-07-25

## 2024-07-25 ASSESSMENT — ENCOUNTER SYMPTOMS
CHEST TIGHTNESS: 0
RHINORRHEA: 0
RESPIRATORY NEGATIVE: 1
GASTROINTESTINAL NEGATIVE: 1
COUGH: 0
EYES NEGATIVE: 1

## 2024-07-25 NOTE — PROGRESS NOTES
Patient is seen in follow up for   Chief Complaint   Patient presents with    Depression     Discuss changing or adjusting medication    Discuss Medications     Depression  here to discuss depression treatment seems like it is worse.    Past Medical History:   Diagnosis Date    Arthritis     right knee and ankle    Pilonidal cyst     Thyroid disease     hypothyroid    Type 1 diabetes ([facility])      Patient Active Problem List    Diagnosis Date Noted    STEFANO (obstructive sleep apnea) 2023    Stage 3 chronic kidney disease, unspecified whether stage 3a or 3b CKD ([facility]) 2024    Other thrombophilia ([facility]) 2023    Bronchitis 2021    Dyspnea 2021    Hypoxia 2021    COVID-19 virus infection     Respiratory failure ([facility]) 2021    Brow ptosis 2016    Long-term insulin use ([facility]) 2016    Nuclear sclerotic cataract of both eyes 2016    Background retinopathy due to secondary diabetes ([facility]) 10/16/2014    Age related cataract 10/16/2014    Type 1 diabetes mellitus ([facility]) 2014    Obesity 2013    Panic attacks 2012    Pilonidal cyst     Vitamin D deficiency 2011    Chronic lymphocytic thyroiditis 2002    Acquired hypothyroidism 2002     Past Surgical History:   Procedure Laterality Date    APPENDECTOMY      ARTHROSCOPY / ARTHROTOMY KNEE Right 2016    RIGHT KNEE ARTHROSCOPY / MEDIAL MENISCUS TEAR / SUPINE  performed by Stephane Wilson MD at [facility] OR     SECTION  97-02     Family History   Problem Relation Age of Onset    Arthritis Mother     Heart Disease Father     High Blood Pressure Father     No Known Problems Daughter      Social History     Socioeconomic History    Marital status:      Spouse name: None    Number of children: None    Years of education: None    Highest education level: None   Tobacco Use    Smoking status: Never    Smokeless tobacco: Never   Vaping Use    Vaping Use: Never used   Substance and Sexual

## 2024-08-19 ENCOUNTER — HOSPITAL ENCOUNTER (OUTPATIENT)
Dept: PHYSICAL THERAPY | Age: 54
Setting detail: THERAPIES SERIES
Discharge: HOME | End: 2024-08-19
Payer: COMMERCIAL

## 2024-08-19 PROCEDURE — 97162 PT EVAL MOD COMPLEX 30 MIN: CPT

## 2024-08-19 RX ORDER — LISINOPRIL 10 MG/1
TABLET ORAL
Qty: 90 TABLET | Refills: 3 | Status: SHIPPED | OUTPATIENT
Start: 2024-08-19

## 2024-08-19 RX ORDER — PANTOPRAZOLE SODIUM 40 MG/1
TABLET, DELAYED RELEASE ORAL
Qty: 90 TABLET | Refills: 3 | Status: SHIPPED | OUTPATIENT
Start: 2024-08-19

## 2024-08-19 RX ORDER — QUETIAPINE FUMARATE 100 MG/1
100 TABLET, FILM COATED ORAL 3 TIMES DAILY
Qty: 270 TABLET | Refills: 3 | Status: SHIPPED | OUTPATIENT
Start: 2024-08-19

## 2024-08-19 RX ORDER — TRAZODONE HYDROCHLORIDE 50 MG/1
TABLET ORAL
Qty: 180 TABLET | Refills: 3 | Status: SHIPPED | OUTPATIENT
Start: 2024-08-19

## 2024-08-19 NOTE — PLAN OF CARE
Physical Therapy Evaluation/Plan of Care   [facility] OUT PATIENT THERAPY AND REHABILITATION  [address]  [facility] [address]  Dept: [phone]  Dept Fax: [phone]  Loc: [phone]    Physical Therapy: Initial Evaluation    General Information    Patient: Abby Bertrand (53 y.o.     female)   Examination Date: 2024   :  1970 ;    Confirmed: Yes MRN: [id number]  CSN: [id number]   Insurance: Payor: [facility] / Plan: [facility] - [place] PPO / Product Type: *No Product type* /   Insurance ID: [id number] - ([facility])  PT Insurance Information: [facility] Secondary Insurance (if applicable):     Referring Physician: Veena Chatterjee PA-C       Visits to Date/Visits Approved:     No Show/Cancelled Appts: 0  0     Medical Diagnosis: Other intervertebral disc displacement, lumbar region [M51.26]  Spondylosis without myelopathy or radiculopathy, lumbosacral region [M47.817] intervertebral disc displacement, spondylosis without myelopathy  Diagnosis: intervertebral disc displacement, spondylosis without myelopathy   Treatment Diagnosis: back pain, radicular pain bilateral LEs, decreased core strength,      SUBJECTIVE:     Onset date:   Onset Date:  ( with worsening over the past few mos)    Subjective/ Mechanism of Injury:   Subjective: Pt reports worsening of back problems since . Pt reports no specific injury. Pt has had multiple ablations and injections. Pt concerned that lumbar vertebrae on \"twisting\" on each other per physician report. Pt had xray in  with results as follows: There are severe degenerative changes at L4-5 with grade 2 anterolisthesis and likely spondylolysis of the posterior elements of L4 which is not well-seen on the current study. There is severe joint space narrowing and sclerosis of articular cartilages   indicating intervertebral osteochondrosis. Pt reports recent worsening of pain in LEs and unable to stand. pt reports severe

## 2024-08-21 ENCOUNTER — HOSPITAL ENCOUNTER (OUTPATIENT)
Dept: PHYSICAL THERAPY | Age: 54
Setting detail: THERAPIES SERIES
Discharge: HOME | End: 2024-08-21
Payer: COMMERCIAL

## 2024-08-21 PROCEDURE — 97110 THERAPEUTIC EXERCISES: CPT

## 2024-08-21 PROCEDURE — G0283 ELEC STIM OTHER THAN WOUND: HCPCS

## 2024-08-21 ASSESSMENT — PAIN DESCRIPTION - LOCATION: LOCATION: BACK

## 2024-08-21 ASSESSMENT — PAIN SCALES - GENERAL: PAINLEVEL_OUTOF10: 6

## 2024-08-21 ASSESSMENT — PAIN DESCRIPTION - DESCRIPTORS: DESCRIPTORS: THROBBING

## 2024-08-21 ASSESSMENT — PAIN DESCRIPTION - ORIENTATION: ORIENTATION: RIGHT;LEFT

## 2024-08-21 ASSESSMENT — PAIN DESCRIPTION - PAIN TYPE: TYPE: CHRONIC PAIN

## 2024-08-21 NOTE — PROGRESS NOTES
[facility]  Outpatient Physical Therapy    Treatment Note        Date: 2024  Patient: Abby Bertrand  : 1970   Confirmed: Yes  MRN: [id number]  Referring Provider: Veena Chatterjee PA-C   Secondary Referring Provider (If applicable):     Medical Diagnosis: Other intervertebral disc displacement, lumbar region [M51.26]  Spondylosis without myelopathy or radiculopathy, lumbosacral region [M47.817]    Treatment Diagnosis: back pain, radicular pain bilateral LEs, decreased core strength,    Visit Information:  Insurance: Payor: [employer] / Plan: [employer] - [place] PPO / Product Type: *No Product type* /   PT Visit Information  PT Insurance Information: [employer]  Total # of Visits Approved: 180  Total # of Visits to Date: 2  No Show: 0  Canceled Appointment: 0  Progress Note Counter:     Subjective Information:  Subjective: Pt reports not able to walk with crutch on Lt side  HEP Compliance:  [x] Good [] Fair [] Poor [] Reports not doing due to:    Pain Screening  Patient Currently in Pain: Yes  Pain Assessment: 0-10  Pain Level: 6  Pain Type: Chronic pain  Pain Location: Back  Pain Orientation: Right, Left  Pain Descriptors: Throbbing    Treatment:  Exercises:  Exercises  Exercise 1: diaphragmatic breathing x5  Exercise 2: LTR x10  Exercise 3: Nu step L1 x5min  Exercise 4: core strength: isometric TA 5 sec/10  Exercise 5: SLR x10  Exercise 6: clams x10  Exercise 7: bridges x10  Exercise 8: * supine Pball  Exercise 9: * gentle wt shifts  Exercise 20: HEP: TA  Treatment Reasoning  Limitations addressed: Mobility, Strength, Proprioception, Flexibility, Activity tolerance, Pain modulation    Manual:   Manual Therapy  Soft Tissue Mobilizaton: tennis ball lumbar paraspinals  Other: * trial MFD cupping  Treatment Reasoning  Limitations addressed: Joint motion, Tissue extensibility, Painful spasm    Modalities:  Moist Heat (CPT 80090)  Patient Position: Seated  Moist heat location: Low back  Post

## 2024-08-26 ENCOUNTER — HOSPITAL ENCOUNTER (OUTPATIENT)
Dept: PHYSICAL THERAPY | Age: 54
Setting detail: THERAPIES SERIES
Discharge: HOME | End: 2024-08-26
Payer: COMMERCIAL

## 2024-08-26 PROCEDURE — 97110 THERAPEUTIC EXERCISES: CPT

## 2024-08-26 ASSESSMENT — PAIN DESCRIPTION - DESCRIPTORS: DESCRIPTORS: THROBBING

## 2024-08-26 ASSESSMENT — PAIN SCALES - GENERAL: PAINLEVEL_OUTOF10: 5

## 2024-08-26 ASSESSMENT — PAIN DESCRIPTION - PAIN TYPE: TYPE: CHRONIC PAIN

## 2024-08-26 ASSESSMENT — PAIN DESCRIPTION - LOCATION: LOCATION: BACK

## 2024-08-26 ASSESSMENT — PAIN DESCRIPTION - ORIENTATION: ORIENTATION: RIGHT;LEFT

## 2024-08-26 NOTE — PROGRESS NOTES
[facility]  Outpatient Physical Therapy    Treatment Note        Date: 2024  Patient: Abby Bertrand  : 1970   Confirmed: Yes  MRN: [id number]  Referring Provider: Veena Chatterjee PA-C   Secondary Referring Provider (If applicable):     Medical Diagnosis: Other intervertebral disc displacement, lumbar region [M51.26]  Spondylosis without myelopathy or radiculopathy, lumbosacral region [M47.817]    Treatment Diagnosis: back pain, radicular pain bilateral LEs, decreased core strength,    Visit Information:  Insurance: Payor: BC / Plan: [employer] - [place] PPO / Product Type: *No Product type* /   PT Visit Information  PT Insurance Information: BCBS  Total # of Visits Approved: 180  Total # of Visits to Date: 3  No Show: 0  Canceled Appointment: 0  Progress Note Counter: 3/12    Subjective Information:  Subjective: Patient reports she had low back X rays last week. Notes she received results on [employer] however the doctor will review them at her next appt. Notes she has been walking with her crutch on the left since last visit.  HEP Compliance:  [x] Good [] Fair [] Poor [] Reports not doing due to:    Pain Screening  Patient Currently in Pain: Yes  Pain Assessment: 0-10  Pain Level: 5  Pain Type: Chronic pain  Pain Location: Back  Pain Orientation: Right, Left  Pain Descriptors: Throbbing    Treatment:  Exercises:  Exercises  Exercise 1: diaphragmatic breathing x5  Exercise 2: LTR x10  Exercise 3: Nu step L1 x5min  Exercise 4: core strength: isometric TA 5 sec/10  Exercise 5: SLR x10, s/l abduction x10  Exercise 6: clams x10  Exercise 7: bridges x10  Exercise 8: supine Pball: LTR, DKTC 5\"x10 ea  Exercise 9: * gentle wt shifts  Exercise 20: HEP: TA  Treatment Reasoning  Limitations addressed: Mobility, Strength, Proprioception, Flexibility, Activity tolerance, Pain modulation    Assessment:   Body Structures, Functions, Activity Limitations Requiring Skilled Therapeutic Intervention:  Decreased functional mobility , Decreased ROM, Decreased tolerance to work activity, Decreased strength, Decreased endurance, Increased pain  Assessment: Continued current exercise program for lumbar mobility and decreased pain. Patient requires verbal cues and demonstration for TA isos. She denies change in pain during/following tx. State she has a home TENS unit at home to use as needed.  Treatment Diagnosis: back pain, radicular pain bilateral LEs, decreased core strength,    Post-Pain Assessment:       Pain Rating (0-10 pain scale):   5/10   Location and pain description same as pre-treatment unless indicated.   Action: [] NA   [] Perform HEP  [x] Meds as prescribed  [] Modalities as prescribed   [] Call Physician     GOALS   Patient Goal(s): Patient Goals : decrease pain    Short Term Goals Completed by 2 wks Goal Status   STG 1 Independent with HEP to promote home management of symptoms In progress   STG 2 Report 25% reduction in symptoms with </= 6/10 pain during daily activities In progress       Long Term Goals Completed by 6 wks Goal Status   LTG 1 Oswestry </= 19 to demonstrate improved overall activity tolerance In progress   LTG 2 Improve core strength >/= 3/5 to improve support for back and standing tolerance In progress   LTG 3 Improve lumbar AROM WFL to improve ease with bed mob and LE dressing In progress   LTG 4 Pt will report centralization of pain with pain not extending past LB In progress       Plan:  Frequency/Duration:  Plan  Plan Frequency: 2x/ wk  Plan weeks: 6 wks  Current Treatment Recommendations: Strengthening, Balance training, Safety education & training, Patient/Caregiver education & training, Equipment evaluation, education, & procurement, Modalities, Functional mobility training, Manual, Neuromuscular re-education  Modalities: Heat/Cold, E-stim - unattended  Pt to continue current HEP.  See objective section for any therapeutic exercise changes, additions or modifications this

## 2024-08-28 ENCOUNTER — HOSPITAL ENCOUNTER (OUTPATIENT)
Dept: PHYSICAL THERAPY | Age: 54
Setting detail: THERAPIES SERIES
Discharge: HOME | End: 2024-08-28
Payer: COMMERCIAL

## 2024-08-28 PROCEDURE — 97112 NEUROMUSCULAR REEDUCATION: CPT

## 2024-08-28 PROCEDURE — 97110 THERAPEUTIC EXERCISES: CPT

## 2024-08-28 ASSESSMENT — PAIN DESCRIPTION - LOCATION: LOCATION: BACK

## 2024-08-28 ASSESSMENT — PAIN SCALES - GENERAL: PAINLEVEL_OUTOF10: 4

## 2024-08-28 ASSESSMENT — PAIN DESCRIPTION - ORIENTATION: ORIENTATION: LEFT;RIGHT

## 2024-08-28 ASSESSMENT — PAIN DESCRIPTION - PAIN TYPE: TYPE: CHRONIC PAIN

## 2024-08-28 ASSESSMENT — PAIN DESCRIPTION - DESCRIPTORS: DESCRIPTORS: ACHING

## 2024-08-28 NOTE — PROGRESS NOTES
[facility]  Outpatient Physical Therapy    Treatment Note        Date: 2024  Patient: Abby Bertrand  : 1970   Confirmed: Yes  MRN: [id number]  Referring Provider: Veena Chatterjee PA-C   Secondary Referring Provider (If applicable):     Medical Diagnosis: Other intervertebral disc displacement, lumbar region [M51.26]  Spondylosis without myelopathy or radiculopathy, lumbosacral region [M47.817] intervertebral disc displacement, spondylosis without myelopathy  Treatment Diagnosis: back pain, radicular pain bilateral LEs, decreased core strength,    Visit Information:  Insurance: Payor: [facility] / Plan: [facility] - [place] PPO / Product Type: *No Product type* /   PT Visit Information  PT Insurance Information: [facility]  Total # of Visits Approved: 180  Total # of Visits to Date: 4  No Show: 0  Canceled Appointment: 0  Progress Note Counter:     Subjective Information:  Subjective: Pt states \"I can feel it.\" Pt continuing to practice use of crutch on Lt side stating \"It's stil awkward.\" Recent Xrays show \"Advanced degenerative changes at L4-5 with 5 mm anterolisthesis of L4-5.  2. Moderate degenerative changes at L1-2.\" Pt expected to have appt w/ MD next Wednesday to discuss results.  HEP Compliance:  [x] Good [] Fair [] Poor [] Reports not doing due to:    Pain Screening  Patient Currently in Pain: Yes  Pain Assessment: 0-10  Pain Level: 4  Pain Type: Chronic pain  Pain Location: Back  Pain Orientation: Left, Right (Lt>Rt)  Pain Descriptors: Aching    Treatment:  Exercises:  Exercises  Exercise 1: diaphragmatic breathing x5  Exercise 2: LTR x10  Exercise 3: Nu step L1 x5min  Exercise 4: core strength: isometric TA 5 sec/10, abd walkouts x10, march x10  Exercise 8: supine Pball: LTR, DKTC 5\"x15 ea  Exercise 9: gentle wt shifts: lat at counter x20, side stepping 4'x4  Exercise 10: S/L trunk rot x10 b/l  Exercise 20: HEP: lat wt shifts, side stepping, S/L trunk rot, SLR, march,  progress   LTG 4 Pt will report centralization of pain with pain not extending past LB In progress     Plan:  Frequency/Duration:  Plan  Plan Frequency: 2x/ wk  Plan weeks: 6 wks  Current Treatment Recommendations: Strengthening, Balance training, Safety education & training, Patient/Caregiver education & training, Equipment evaluation, education, & procurement, Modalities, Functional mobility training, Manual, Neuromuscular re-education  Modalities: Heat/Cold, E-stim - unattended  Pt to continue current HEP.  See objective section for any therapeutic exercise changes, additions or modifications this date.    Therapy Time:      PT Individual Minutes  Time In: 1300  Time Out: 1340  Minutes: 40  Timed Code Treatment Minutes: 40 Minutes  Procedure Minutes: N/A   Timed Activity Minutes Units   Ther Ex 30 2   Neuro Re-ed 10 1     Electronically signed by Susie Paris PTA on 8/28/24 at 1:31 PM EDT

## 2024-09-03 ENCOUNTER — HOSPITAL ENCOUNTER (OUTPATIENT)
Dept: PHYSICAL THERAPY | Age: 54
Setting detail: THERAPIES SERIES
Discharge: HOME | End: 2024-09-03
Payer: COMMERCIAL

## 2024-09-03 PROCEDURE — 97110 THERAPEUTIC EXERCISES: CPT

## 2024-09-03 PROCEDURE — G0283 ELEC STIM OTHER THAN WOUND: HCPCS

## 2024-09-03 ASSESSMENT — PAIN DESCRIPTION - DESCRIPTORS: DESCRIPTORS: ACHING

## 2024-09-03 ASSESSMENT — PAIN DESCRIPTION - ORIENTATION: ORIENTATION: LEFT

## 2024-09-03 ASSESSMENT — PAIN SCALES - GENERAL: PAINLEVEL_OUTOF10: 7

## 2024-09-03 ASSESSMENT — PAIN DESCRIPTION - LOCATION: LOCATION: BACK;KNEE

## 2024-09-05 ENCOUNTER — HOSPITAL ENCOUNTER (OUTPATIENT)
Dept: PHYSICAL THERAPY | Age: 54
Setting detail: THERAPIES SERIES
Discharge: HOME | End: 2024-09-05
Payer: COMMERCIAL

## 2024-09-05 NOTE — PROGRESS NOTES
Therapy                            Cancellation/No-show Note      Date: 2024  Patient: Abby Bertrand (53 y.o. female)  : 1970  MRN:  [id number]  Referring Physician: Veena Chatterjee PA-C    Medical Diagnosis: Other intervertebral disc displacement, lumbar region [M51.26]  Spondylosis without myelopathy or radiculopathy, lumbosacral region [M47.817]      Visit Information:  Visits to Date 5   No Show/Cancelled Appts:       For today's appointment patient:  [x]  Cancelled  []  Rescheduled appointment  []  No-show   []  Called pt to remind of next appointment     Reason given by patient:  [x]  Patient ill  []  Conflicting appointment  []  No transportation    []  Conflict with work  []  No reason given  []  Other:      [x] Pt has future appointments scheduled, no follow up needed  [] Pt requests to be on hold.    Reason:   If > 2 weeks please discuss with therapist.  [] Therapist to call pt for follow up  []  to call to re-schedule      Comments:       Signature: Electronically signed by SHANTELLE SIMMS PTA on 24 at 1:20 PM EDT

## 2024-09-06 ENCOUNTER — TELEPHONE (OUTPATIENT)
Dept: FAMILY MEDICINE CLINIC | Age: 54
End: 2024-09-06

## 2024-09-09 ENCOUNTER — HOSPITAL ENCOUNTER (OUTPATIENT)
Dept: PHYSICAL THERAPY | Age: 54
Setting detail: THERAPIES SERIES
Discharge: HOME | End: 2024-09-09
Payer: COMMERCIAL

## 2024-09-09 PROCEDURE — 97110 THERAPEUTIC EXERCISES: CPT

## 2024-09-09 ASSESSMENT — PAIN DESCRIPTION - PAIN TYPE: TYPE: CHRONIC PAIN

## 2024-09-09 ASSESSMENT — PAIN SCALES - GENERAL: PAINLEVEL_OUTOF10: 6

## 2024-09-09 ASSESSMENT — PAIN DESCRIPTION - DESCRIPTORS: DESCRIPTORS: ACHING

## 2024-09-09 ASSESSMENT — PAIN DESCRIPTION - ORIENTATION: ORIENTATION: LEFT

## 2024-09-09 ASSESSMENT — PAIN DESCRIPTION - LOCATION: LOCATION: BACK;KNEE

## 2024-09-11 ENCOUNTER — HOSPITAL ENCOUNTER (OUTPATIENT)
Dept: PHYSICAL THERAPY | Age: 54
Setting detail: THERAPIES SERIES
Discharge: HOME | End: 2024-09-11
Payer: COMMERCIAL

## 2024-09-11 PROCEDURE — 97110 THERAPEUTIC EXERCISES: CPT

## 2024-09-11 ASSESSMENT — PAIN DESCRIPTION - ORIENTATION: ORIENTATION: LEFT;RIGHT

## 2024-09-11 ASSESSMENT — PAIN DESCRIPTION - PAIN TYPE: TYPE: CHRONIC PAIN

## 2024-09-11 ASSESSMENT — PAIN DESCRIPTION - LOCATION: LOCATION: BACK;KNEE

## 2024-09-16 ENCOUNTER — HOSPITAL ENCOUNTER (OUTPATIENT)
Dept: PHYSICAL THERAPY | Age: 54
Setting detail: THERAPIES SERIES
Discharge: HOME | End: 2024-09-16
Payer: COMMERCIAL

## 2024-09-16 PROCEDURE — 97110 THERAPEUTIC EXERCISES: CPT

## 2024-09-16 ASSESSMENT — PAIN DESCRIPTION - LOCATION: LOCATION: BACK;KNEE

## 2024-09-16 ASSESSMENT — PAIN SCALES - GENERAL: PAINLEVEL_OUTOF10: 3

## 2024-09-16 ASSESSMENT — PAIN DESCRIPTION - ORIENTATION: ORIENTATION: LEFT;RIGHT

## 2024-09-16 ASSESSMENT — PAIN DESCRIPTION - DESCRIPTORS: DESCRIPTORS: ACHING

## 2024-09-18 ENCOUNTER — HOSPITAL ENCOUNTER (OUTPATIENT)
Dept: PHYSICAL THERAPY | Age: 54
Setting detail: THERAPIES SERIES
Discharge: HOME | End: 2024-09-18
Payer: COMMERCIAL

## 2024-09-24 ENCOUNTER — HOSPITAL ENCOUNTER (OUTPATIENT)
Dept: PHYSICAL THERAPY | Age: 54
Setting detail: THERAPIES SERIES
Discharge: HOME | End: 2024-09-24
Payer: COMMERCIAL

## 2024-09-24 PROCEDURE — 97110 THERAPEUTIC EXERCISES: CPT

## 2024-09-24 ASSESSMENT — PAIN DESCRIPTION - DESCRIPTORS: DESCRIPTORS: ACHING

## 2024-09-24 ASSESSMENT — PAIN DESCRIPTION - PAIN TYPE: TYPE: CHRONIC PAIN

## 2024-09-24 ASSESSMENT — PAIN DESCRIPTION - LOCATION: LOCATION: BACK;KNEE

## 2024-09-24 ASSESSMENT — PAIN SCALES - GENERAL: PAINLEVEL_OUTOF10: 3

## 2024-10-09 ENCOUNTER — HOSPITAL ENCOUNTER (OUTPATIENT)
Dept: MRI IMAGING | Age: 54
Discharge: HOME | End: 2024-10-11
Payer: COMMERCIAL

## 2024-10-09 DIAGNOSIS — M54.16 LUMBAR RADICULOPATHY: ICD-10-CM

## 2024-10-09 PROCEDURE — 72148 MRI LUMBAR SPINE W/O DYE: CPT

## 2024-11-11 ENCOUNTER — OFFICE VISIT (OUTPATIENT)
Dept: OBGYN CLINIC | Age: 54
End: 2024-11-11
Payer: COMMERCIAL

## 2024-11-11 VITALS
HEIGHT: 66 IN | WEIGHT: 293 LBS | SYSTOLIC BLOOD PRESSURE: 134 MMHG | DIASTOLIC BLOOD PRESSURE: 68 MMHG | BODY MASS INDEX: 47.09 KG/M2

## 2024-11-11 DIAGNOSIS — Z01.419 WELL WOMAN EXAM WITH ROUTINE GYNECOLOGICAL EXAM: ICD-10-CM

## 2024-11-11 DIAGNOSIS — Z12.4 ENCOUNTER FOR PAP SMEAR OF CERVIX WITH HPV DNA COTESTING: ICD-10-CM

## 2024-11-11 DIAGNOSIS — Z12.11 ENCOUNTER FOR SCREENING COLONOSCOPY: ICD-10-CM

## 2024-11-11 DIAGNOSIS — R93.89 THICKENED ENDOMETRIUM: ICD-10-CM

## 2024-11-11 DIAGNOSIS — Z01.419 WELL WOMAN EXAM WITH ROUTINE GYNECOLOGICAL EXAM: Primary | ICD-10-CM

## 2024-11-11 DIAGNOSIS — Z12.31 BREAST CANCER SCREENING BY MAMMOGRAM: ICD-10-CM

## 2024-11-11 PROCEDURE — 99386 PREV VISIT NEW AGE 40-64: CPT | Performed by: OBSTETRICS & GYNECOLOGY

## 2024-11-11 RX ORDER — INSULIN LISPRO 100 [IU]/ML
INJECTION, SOLUTION INTRAVENOUS; SUBCUTANEOUS
Qty: 90 ML | Refills: 1 | Status: SHIPPED | OUTPATIENT
Start: 2024-11-11

## 2024-11-11 NOTE — PROGRESS NOTES
SUBJECTIVE:   54 y.o.  female here for annual exam. Pt  and no complaints today.  PT reports LMP 2020. Pt with recent MRI for back issues and an ES of 1.8cm was noted. PT denies any VB.     Review of Systems:  General ROS: negative  Psychological ROS: negative  ENT ROS: negative  Endocrine ROS: negative  Respiratory ROS: no cough, shortness of breath, or wheezing  Cardiovascular ROS: no chest pain or dyspnea on exertion  Gastrointestinal ROS: no abdominal pain, change in bowel habits, or black or bloody stools  Genito-Urinary ROS: no dysuria, trouble voiding, or hematuria  Musculoskeletal ROS: negative  Neurological ROS: no TIA or stroke symptoms  Dermatological ROS: negative    OBJECTIVE:   /68   Ht 1.676 m (5' 6\")   Wt (!) 158.8 kg (350 lb)   LMP 2020   BMI 56.49 kg/m²     Physical Exam:  CONSTITUTIONAL: She appears well nourished and developed   NEUROLOGIC: Alert and oriented to time, place and person  NECK: no thyroidmegaly  BREASTS: Bilateral breasts without masses, lesions or nipple discharge  LUNGS: Clear to ascultation bilaterally  CVS: regular rate and rhythm  LYMPHATIC: No palpable lymph nodes  ABDOMEN: benign, soft, nontender, no masses. No liver or splenic organomegaly. No evidence of abdominal or inguinal hernia. No indication for occult blood testing  SKIN: normal texture and tone, no lesions  NEURO: normal tone, no hyperreflexia, 1+DTRs throughout    Pelvic Exam:   EFG: normal external genitalia  URETHRAL MEATUS: normal size, no diverticula   URETHRA: normal appearing without diverticula or lesions  BLADDER:  No masses or tenderness  VAGINA: normal rugae, no discharge   CERVIX: limited exam due to body  habitus  UTERUS: limited exam due to body habitus  ADNEXA: limited exam due to body habitus  PERINEUM: normal appearing without lesions or masses  RECTUM: no hemorrhoids or rectal masses.     ASSESSMENT:   Routine gynecologic exam  Breast cancer screening  Screening

## 2024-11-12 LAB — FOLLICLE STIMULATING HORMONE: 34.9 MIU/ML

## 2024-11-13 ENCOUNTER — TELEPHONE (OUTPATIENT)
Dept: OBGYN CLINIC | Age: 54
End: 2024-11-13

## 2024-11-13 NOTE — TELEPHONE ENCOUNTER
----- Message from Dr. Michelle Lopez MD sent at 11/13/2024  9:12 AM EST -----  May advise pt that her hormone level puts her in a menopausal range.

## 2024-11-15 LAB
HPV HR 12 DNA SPEC QL NAA+PROBE: NOT DETECTED
HPV16 DNA SPEC QL NAA+PROBE: NOT DETECTED
HPV16+18+H RISK 12 DNA SPEC-IMP: NORMAL
HPV18 DNA SPEC QL NAA+PROBE: NOT DETECTED

## 2024-11-20 ENCOUNTER — HOSPITAL ENCOUNTER (OUTPATIENT)
Dept: ULTRASOUND IMAGING | Age: 54
Discharge: HOME | End: 2024-11-22
Attending: OBSTETRICS & GYNECOLOGY
Payer: COMMERCIAL

## 2024-11-20 DIAGNOSIS — R93.89 THICKENED ENDOMETRIUM: ICD-10-CM

## 2024-11-20 PROCEDURE — 93975 VASCULAR STUDY: CPT

## 2024-11-20 PROCEDURE — 76856 US EXAM PELVIC COMPLETE: CPT

## 2024-11-20 PROCEDURE — 76830 TRANSVAGINAL US NON-OB: CPT

## 2024-11-21 ENCOUNTER — TELEPHONE (OUTPATIENT)
Dept: OBGYN CLINIC | Age: 54
End: 2024-11-21

## 2024-11-21 DIAGNOSIS — R93.89 THICKENED ENDOMETRIUM: Primary | ICD-10-CM

## 2024-11-21 NOTE — TELEPHONE ENCOUNTER
----- Message from Dr. Michelle Lopez MD sent at 11/21/2024  9:16 AM EST -----  Refer to Dr. Almonte for EMB and further evaluation of thickened ES

## 2024-12-02 ENCOUNTER — OFFICE VISIT (OUTPATIENT)
Dept: OBGYN CLINIC | Age: 54
End: 2024-12-02
Payer: COMMERCIAL

## 2024-12-02 VITALS — BODY MASS INDEX: 41.95 KG/M2 | WEIGHT: 293 LBS | HEIGHT: 70 IN

## 2024-12-02 DIAGNOSIS — R93.89 THICKENED ENDOMETRIUM: Primary | ICD-10-CM

## 2024-12-02 PROCEDURE — 99213 OFFICE O/P EST LOW 20 MIN: CPT | Performed by: STUDENT IN AN ORGANIZED HEALTH CARE EDUCATION/TRAINING PROGRAM

## 2024-12-02 NOTE — PROGRESS NOTES
to complete EMB. Procedure was aborted. Discussed performing hysteroscopic guided biopsy in the operating room which patient is agreeable to.     Surgical consent   Today we discussed that based on clinical findings, a hysteroscopy/D&C is recommended for further evaluation of endometrium.  The procedure was discussed with the patient in detail.  She is aware that the procedure involves visualizing the endometrium with a camera and that tissue will be obtained for pathology.      Hysteroscopy discussed with D&C:     [x] Risks of injury to uterus, risk of uterine perforation, injury to bowel, injury to bladder, injury to ureter, injury to blood vessel,hemorrhage, transfusion, infection   [x] Fluid and electrolyte issues   [x] Risks of possible need for laparoscopy, laparotomy   [x] Pain control   [x] Recovery period and post-op expectations    All questions were answered and pt elects to proceed with a hysterosocopy d&c.           Return for PAT.    No orders of the defined types were placed in this encounter.

## 2024-12-02 NOTE — ASSESSMENT & PLAN NOTE
FSH done recently to suggest post menopausal range. Reviewed US results that shows thickened stripe. Typical stripe for post menopausal women should be <4mm. I offered EMB today to assess for hyperplasia/malignancy. She has family history of ovarian or uterine cancer in multiple paternal aunts. We also discussed obesity as a risk factor for hyperplasia. Pt electing for EMB today. She would consider hysterectomy pending biopsy results.    EMB Procedure -   The patient was counseled on the procedure. Risks, benefits and alternatives were reviewed and questions were answered. Patient voiced her understanding of the risks and associated risks and benefits, and desired to proceed with the procedure. The patient is aware that this is diagnostic, and not curative, and a second procedure may be needed.      The patient was positioned comfortably on the exam table. A speculum was inserted into the vagina. The cervix was visualized but too posterior to be grasped with a tenaculum. The speculum was repositioned twice, but still unable to grasp cervix to complete EMB. Procedure was aborted. Discussed performing hysteroscopic guided biopsy in the operating room which patient is agreeable to.     Surgical consent   Today we discussed that based on clinical findings, a hysteroscopy/D&C is recommended for further evaluation of endometrium.  The procedure was discussed with the patient in detail.  She is aware that the procedure involves visualizing the endometrium with a camera and that tissue will be obtained for pathology.      Hysteroscopy discussed with D&C:     [x] Risks of injury to uterus, risk of uterine perforation, injury to bowel, injury to bladder, injury to ureter, injury to blood vessel,hemorrhage, transfusion, infection   [x] Fluid and electrolyte issues   [x] Risks of possible need for laparoscopy, laparotomy   [x] Pain control   [x] Recovery period and post-op expectations    All questions were answered and pt  Patient is requesting CT Lung screening, please prepare order and route back to notify patient.

## 2024-12-03 ENCOUNTER — OFFICE VISIT (OUTPATIENT)
Dept: FAMILY MEDICINE CLINIC | Age: 54
End: 2024-12-03

## 2024-12-03 ENCOUNTER — PREP FOR PROCEDURE (OUTPATIENT)
Dept: OBGYN CLINIC | Age: 54
End: 2024-12-03

## 2024-12-03 VITALS
WEIGHT: 293 LBS | HEIGHT: 70 IN | BODY MASS INDEX: 41.95 KG/M2 | DIASTOLIC BLOOD PRESSURE: 84 MMHG | OXYGEN SATURATION: 92 % | SYSTOLIC BLOOD PRESSURE: 178 MMHG | TEMPERATURE: 97.3 F | HEART RATE: 80 BPM

## 2024-12-03 DIAGNOSIS — R05.8 OTHER COUGH: ICD-10-CM

## 2024-12-03 DIAGNOSIS — R68.89 FLU-LIKE SYMPTOMS: ICD-10-CM

## 2024-12-03 DIAGNOSIS — J40 BRONCHITIS: Primary | ICD-10-CM

## 2024-12-03 LAB
INFLUENZA A ANTIBODY: NORMAL
INFLUENZA B ANTIBODY: NORMAL
Lab: NORMAL
PERFORMING INSTRUMENT: NORMAL
QC PASS/FAIL: NORMAL
SARS-COV-2, POC: NORMAL

## 2024-12-03 RX ORDER — PREDNISONE 20 MG/1
20 TABLET ORAL 2 TIMES DAILY
Qty: 10 TABLET | Refills: 0 | Status: SHIPPED | OUTPATIENT
Start: 2024-12-03 | End: 2024-12-08

## 2024-12-03 RX ORDER — AZITHROMYCIN 250 MG/1
TABLET, FILM COATED ORAL
Qty: 6 TABLET | Refills: 0 | Status: SHIPPED | OUTPATIENT
Start: 2024-12-03 | End: 2024-12-13

## 2024-12-03 RX ORDER — BENZONATATE 200 MG/1
200 CAPSULE ORAL 3 TIMES DAILY PRN
Qty: 21 CAPSULE | Refills: 0 | Status: SHIPPED | OUTPATIENT
Start: 2024-12-03 | End: 2024-12-10

## 2024-12-03 ASSESSMENT — ENCOUNTER SYMPTOMS
WHEEZING: 1
EYE ITCHING: 0
EYE REDNESS: 0
NAUSEA: 0
SHORTNESS OF BREATH: 1
EYE DISCHARGE: 0
VOMITING: 0
SORE THROAT: 0
RHINORRHEA: 0
SINUS PRESSURE: 0
DIARRHEA: 0
CHEST TIGHTNESS: 1
COUGH: 1
SINUS PAIN: 0
ABDOMINAL PAIN: 0
EYE PAIN: 0

## 2024-12-03 NOTE — PROGRESS NOTES
Subjective:      Patient ID: Abby Bertrand is a 54 y.o. female who presents today for:  Chief Complaint   Patient presents with   • Cold Symptoms     Patient presents for URI symptoms. X2 days. She states that she is coughing and its hard to breathe, she has been taking robitussin but it's not helping. She denies sore throat and other symptoms.        HPI  Patient is here with cough and SOB for the last 2 days.   Says she has susceptible to pneumonia.   Says she is taking Robitussin and not helping.  Says she has \"chronic bronchitis\".  Says she has not had any fever.   Says she feel weak and hard to breath.  Says she has albuterol inhaler as well as has been using that.   Past Medical History:   Diagnosis Date   • Arthritis     right knee and ankle   • Chronic kidney disease    • Hypothyroidism    • Menopausal symptoms    • Pilonidal cyst    • Thyroid disease     hypothyroid   • Type 1 diabetes (HCC)      Past Surgical History:   Procedure Laterality Date   • APPENDECTOMY     • ARTHROSCOPY / ARTHROTOMY KNEE Right 2016    RIGHT KNEE ARTHROSCOPY / MEDIAL MENISCUS TEAR / SUPINE  performed by Stephane Wilson MD at [facility] [place]   •  SECTION       Social History     Socioeconomic History   • Marital status:      Spouse name: Not on file   • Number of children: Not on file   • Years of education: Not on file   • Highest education level: Not on file   Occupational History   • Not on file   Tobacco Use   • Smoking status: Never   • Smokeless tobacco: Never   • Tobacco comments:     never smoked   Vaping Use   • Vaping status: Never Used   Substance and Sexual Activity   • Alcohol use: No   • Drug use: No   • Sexual activity: Not Currently     Partners: Male   Other Topics Concern   • Not on file   Social History Narrative   • Not on file     Social Determinants of Health     Financial Resource Strain: Low Risk  (2024)    Overall Financial Resource Strain (CARDIA)    • Difficulty of Paying

## 2025-01-03 ENCOUNTER — HOSPITAL ENCOUNTER (OUTPATIENT)
Dept: PREADMISSION TESTING | Age: 55
Discharge: HOME | End: 2025-01-07
Payer: COMMERCIAL

## 2025-01-03 VITALS
WEIGHT: 293 LBS | RESPIRATION RATE: 20 BRPM | BODY MASS INDEX: 48.82 KG/M2 | OXYGEN SATURATION: 97 % | DIASTOLIC BLOOD PRESSURE: 68 MMHG | HEIGHT: 65 IN | TEMPERATURE: 98.9 F | HEART RATE: 88 BPM | SYSTOLIC BLOOD PRESSURE: 144 MMHG

## 2025-01-03 DIAGNOSIS — R93.89 THICKENED ENDOMETRIUM: Primary | ICD-10-CM

## 2025-01-03 LAB
ANION GAP SERPL CALCULATED.3IONS-SCNC: 12 MEQ/L (ref 9–15)
APTT PPP: 25.9 SEC (ref 24.4–36.8)
BUN SERPL-MCNC: 21 MG/DL (ref 6–20)
CALCIUM SERPL-MCNC: 9 MG/DL (ref 8.5–9.9)
CHLORIDE SERPL-SCNC: 108 MEQ/L (ref 95–107)
CO2 SERPL-SCNC: 21 MEQ/L (ref 20–31)
CREAT SERPL-MCNC: 1.22 MG/DL (ref 0.5–0.9)
ERYTHROCYTE [DISTWIDTH] IN BLOOD BY AUTOMATED COUNT: 13.9 % (ref 11.5–14.5)
GLUCOSE SERPL-MCNC: 140 MG/DL (ref 70–99)
HCT VFR BLD AUTO: 41.2 % (ref 37–47)
HGB BLD-MCNC: 13.4 G/DL (ref 12–16)
INR PPP: 1
MCH RBC QN AUTO: 29.8 PG (ref 27–31.3)
MCHC RBC AUTO-ENTMCNC: 32.5 % (ref 33–37)
MCV RBC AUTO: 91.6 FL (ref 79.4–94.8)
PLATELET # BLD AUTO: 259 K/UL (ref 130–400)
POTASSIUM SERPL-SCNC: 4 MEQ/L (ref 3.4–4.9)
PROTHROMBIN TIME: 13.8 SEC (ref 12.3–14.9)
RBC # BLD AUTO: 4.5 M/UL (ref 4.2–5.4)
SODIUM SERPL-SCNC: 141 MEQ/L (ref 135–144)
WBC # BLD AUTO: 6.1 K/UL (ref 4.8–10.8)

## 2025-01-03 PROCEDURE — 86901 BLOOD TYPING SEROLOGIC RH(D): CPT

## 2025-01-03 PROCEDURE — 86850 RBC ANTIBODY SCREEN: CPT

## 2025-01-03 PROCEDURE — 85730 THROMBOPLASTIN TIME PARTIAL: CPT

## 2025-01-03 PROCEDURE — 85610 PROTHROMBIN TIME: CPT

## 2025-01-03 PROCEDURE — 83036 HEMOGLOBIN GLYCOSYLATED A1C: CPT

## 2025-01-03 PROCEDURE — 80048 BASIC METABOLIC PNL TOTAL CA: CPT

## 2025-01-03 PROCEDURE — 85027 COMPLETE CBC AUTOMATED: CPT

## 2025-01-03 PROCEDURE — 86900 BLOOD TYPING SEROLOGIC ABO: CPT

## 2025-01-03 RX ORDER — SODIUM CHLORIDE 0.9 % (FLUSH) 0.9 %
5-40 SYRINGE (ML) INJECTION PRN
Status: CANCELLED | OUTPATIENT
Start: 2025-01-10

## 2025-01-03 RX ORDER — ACETAMINOPHEN 500 MG
1000 TABLET ORAL ONCE
Status: CANCELLED | OUTPATIENT
Start: 2025-01-10 | End: 2025-01-03

## 2025-01-03 RX ORDER — GABAPENTIN 100 MG/1
100 CAPSULE ORAL 2 TIMES DAILY
COMMUNITY

## 2025-01-03 RX ORDER — SODIUM CHLORIDE 9 MG/ML
INJECTION, SOLUTION INTRAVENOUS PRN
Status: CANCELLED | OUTPATIENT
Start: 2025-01-10

## 2025-01-03 RX ORDER — SODIUM CHLORIDE 0.9 % (FLUSH) 0.9 %
5-40 SYRINGE (ML) INJECTION EVERY 12 HOURS SCHEDULED
Status: CANCELLED | OUTPATIENT
Start: 2025-01-10

## 2025-01-03 RX ORDER — SODIUM CHLORIDE 9 MG/ML
INJECTION, SOLUTION INTRAVENOUS CONTINUOUS
Status: CANCELLED | OUTPATIENT
Start: 2025-01-10

## 2025-01-03 NOTE — H&P (VIEW-ONLY)
Neurological:      General: No focal deficit present.      Mental Status: She is alert and oriented to person, place, and time.      Motor: No weakness.      Gait: Gait abnormal (in a wheelchair).   Psychiatric:         Mood and Affect: Mood normal.         Behavior: Behavior normal.         Thought Content: Thought content normal.         Judgment: Judgment normal.          BP (!) 144/68   Pulse 88   Temp 98.9 °F (37.2 °C) (Temporal)   Resp 20   Ht 1.651 m (5' 5\")   Wt (!) 157.8 kg (347 lb 12.8 oz)   LMP 02/19/2020   SpO2 97%   BMI 57.88 kg/m²         ASSESSMENT  Patient Active Problem List   Diagnosis    Pilonidal cyst    Panic attacks    Obesity    Type 1 diabetes mellitus (HCC)    Background retinopathy due to secondary diabetes (HCC)    Chronic lymphocytic thyroiditis    Age related cataract    Acquired hypothyroidism    Vitamin D deficiency    Brow ptosis    Long-term insulin use (HCC)    Nuclear sclerotic cataract of both eyes    Respiratory failure    COVID-19 virus infection    Hypoxia    Bronchitis    Dyspnea    STEFANO (obstructive sleep apnea)    Other thrombophilia (HCC)    Stage 3 chronic kidney disease, unspecified whether stage 3a or 3b CKD (HCC)    Thickened endometrium       Plan:  Preoperative workup as follows: PAT, CBC, BMP, PT/INR, APTT, T&S, HGB A1C  2.   Scheduled for: HYSTEROSCOPY, DILATION AND CURETTAGE on 1/10/25.      SIGNATURE: ADRIANA Glass - CNP  DATE: January 3, 2025

## 2025-01-04 LAB
ABO/RH: NORMAL
ANTIBODY SCREEN: NORMAL
ESTIMATED AVERAGE GLUCOSE: 131 MG/DL
HBA1C MFR BLD: 6.2 % (ref 4–6)

## 2025-01-09 ENCOUNTER — ANESTHESIA EVENT (OUTPATIENT)
Dept: OPERATING ROOM | Age: 55
End: 2025-01-09
Payer: COMMERCIAL

## 2025-01-10 ENCOUNTER — HOSPITAL ENCOUNTER (OUTPATIENT)
Age: 55
Setting detail: OUTPATIENT SURGERY
Discharge: HOME | End: 2025-01-10
Attending: STUDENT IN AN ORGANIZED HEALTH CARE EDUCATION/TRAINING PROGRAM | Admitting: STUDENT IN AN ORGANIZED HEALTH CARE EDUCATION/TRAINING PROGRAM
Payer: COMMERCIAL

## 2025-01-10 ENCOUNTER — ANESTHESIA (OUTPATIENT)
Dept: OPERATING ROOM | Age: 55
End: 2025-01-10
Payer: COMMERCIAL

## 2025-01-10 VITALS
SYSTOLIC BLOOD PRESSURE: 133 MMHG | BODY MASS INDEX: 48.82 KG/M2 | TEMPERATURE: 96.9 F | WEIGHT: 293 LBS | HEART RATE: 87 BPM | HEIGHT: 65 IN | OXYGEN SATURATION: 95 % | RESPIRATION RATE: 19 BRPM | DIASTOLIC BLOOD PRESSURE: 63 MMHG

## 2025-01-10 DIAGNOSIS — R93.89 THICKENED ENDOMETRIUM: ICD-10-CM

## 2025-01-10 LAB
GLUCOSE BLD-MCNC: 156 MG/DL (ref 70–99)
GLUCOSE BLD-MCNC: 99 MG/DL (ref 70–99)
PERFORMED ON: ABNORMAL
PERFORMED ON: NORMAL

## 2025-01-10 PROCEDURE — 3700000000 HC ANESTHESIA ATTENDED CARE: Performed by: STUDENT IN AN ORGANIZED HEALTH CARE EDUCATION/TRAINING PROGRAM

## 2025-01-10 PROCEDURE — 7100000010 HC PHASE II RECOVERY - FIRST 15 MIN: Performed by: STUDENT IN AN ORGANIZED HEALTH CARE EDUCATION/TRAINING PROGRAM

## 2025-01-10 PROCEDURE — 2500000003 HC RX 250 WO HCPCS

## 2025-01-10 PROCEDURE — 2580000003 HC RX 258: Performed by: STUDENT IN AN ORGANIZED HEALTH CARE EDUCATION/TRAINING PROGRAM

## 2025-01-10 PROCEDURE — 2500000003 HC RX 250 WO HCPCS: Performed by: STUDENT IN AN ORGANIZED HEALTH CARE EDUCATION/TRAINING PROGRAM

## 2025-01-10 PROCEDURE — 3600000014 HC SURGERY LEVEL 4 ADDTL 15MIN: Performed by: STUDENT IN AN ORGANIZED HEALTH CARE EDUCATION/TRAINING PROGRAM

## 2025-01-10 PROCEDURE — 88305 TISSUE EXAM BY PATHOLOGIST: CPT

## 2025-01-10 PROCEDURE — 2720000010 HC SURG SUPPLY STERILE: Performed by: STUDENT IN AN ORGANIZED HEALTH CARE EDUCATION/TRAINING PROGRAM

## 2025-01-10 PROCEDURE — 2709999900 HC NON-CHARGEABLE SUPPLY: Performed by: STUDENT IN AN ORGANIZED HEALTH CARE EDUCATION/TRAINING PROGRAM

## 2025-01-10 PROCEDURE — 58558 HYSTEROSCOPY BIOPSY: CPT | Performed by: STUDENT IN AN ORGANIZED HEALTH CARE EDUCATION/TRAINING PROGRAM

## 2025-01-10 PROCEDURE — 6370000000 HC RX 637 (ALT 250 FOR IP): Performed by: STUDENT IN AN ORGANIZED HEALTH CARE EDUCATION/TRAINING PROGRAM

## 2025-01-10 PROCEDURE — 3700000001 HC ADD 15 MINUTES (ANESTHESIA): Performed by: STUDENT IN AN ORGANIZED HEALTH CARE EDUCATION/TRAINING PROGRAM

## 2025-01-10 PROCEDURE — 7100000000 HC PACU RECOVERY - FIRST 15 MIN: Performed by: STUDENT IN AN ORGANIZED HEALTH CARE EDUCATION/TRAINING PROGRAM

## 2025-01-10 PROCEDURE — 6360000002 HC RX W HCPCS

## 2025-01-10 PROCEDURE — 7100000011 HC PHASE II RECOVERY - ADDTL 15 MIN: Performed by: STUDENT IN AN ORGANIZED HEALTH CARE EDUCATION/TRAINING PROGRAM

## 2025-01-10 PROCEDURE — 7100000001 HC PACU RECOVERY - ADDTL 15 MIN: Performed by: STUDENT IN AN ORGANIZED HEALTH CARE EDUCATION/TRAINING PROGRAM

## 2025-01-10 PROCEDURE — 3600000004 HC SURGERY LEVEL 4 BASE: Performed by: STUDENT IN AN ORGANIZED HEALTH CARE EDUCATION/TRAINING PROGRAM

## 2025-01-10 RX ORDER — ROCURONIUM BROMIDE 10 MG/ML
INJECTION, SOLUTION INTRAVENOUS
Status: DISCONTINUED | OUTPATIENT
Start: 2025-01-10 | End: 2025-01-10 | Stop reason: SDUPTHER

## 2025-01-10 RX ORDER — ONDANSETRON 2 MG/ML
4 INJECTION INTRAMUSCULAR; INTRAVENOUS
Status: DISCONTINUED | OUTPATIENT
Start: 2025-01-10 | End: 2025-01-10 | Stop reason: HOSPADM

## 2025-01-10 RX ORDER — DEXMEDETOMIDINE HYDROCHLORIDE 100 UG/ML
INJECTION, SOLUTION INTRAVENOUS
Status: DISCONTINUED | OUTPATIENT
Start: 2025-01-10 | End: 2025-01-10 | Stop reason: SDUPTHER

## 2025-01-10 RX ORDER — LIDOCAINE HYDROCHLORIDE 10 MG/ML
1 INJECTION, SOLUTION EPIDURAL; INFILTRATION; INTRACAUDAL; PERINEURAL
Status: DISCONTINUED | OUTPATIENT
Start: 2025-01-10 | End: 2025-01-10 | Stop reason: HOSPADM

## 2025-01-10 RX ORDER — SODIUM CHLORIDE 0.9 % (FLUSH) 0.9 %
5-40 SYRINGE (ML) INJECTION EVERY 12 HOURS SCHEDULED
Status: DISCONTINUED | OUTPATIENT
Start: 2025-01-10 | End: 2025-01-10 | Stop reason: HOSPADM

## 2025-01-10 RX ORDER — SODIUM CHLORIDE 0.9 % (FLUSH) 0.9 %
5-40 SYRINGE (ML) INJECTION PRN
Status: DISCONTINUED | OUTPATIENT
Start: 2025-01-10 | End: 2025-01-10 | Stop reason: HOSPADM

## 2025-01-10 RX ORDER — FENTANYL CITRATE 50 UG/ML
INJECTION, SOLUTION INTRAMUSCULAR; INTRAVENOUS
Status: DISCONTINUED | OUTPATIENT
Start: 2025-01-10 | End: 2025-01-10 | Stop reason: SDUPTHER

## 2025-01-10 RX ORDER — FENTANYL CITRATE 0.05 MG/ML
50 INJECTION, SOLUTION INTRAMUSCULAR; INTRAVENOUS EVERY 10 MIN PRN
Status: DISCONTINUED | OUTPATIENT
Start: 2025-01-10 | End: 2025-01-10 | Stop reason: HOSPADM

## 2025-01-10 RX ORDER — METOCLOPRAMIDE HYDROCHLORIDE 5 MG/ML
10 INJECTION INTRAMUSCULAR; INTRAVENOUS
Status: DISCONTINUED | OUTPATIENT
Start: 2025-01-10 | End: 2025-01-10 | Stop reason: HOSPADM

## 2025-01-10 RX ORDER — SODIUM CHLORIDE 9 MG/ML
INJECTION, SOLUTION INTRAVENOUS PRN
Status: DISCONTINUED | OUTPATIENT
Start: 2025-01-10 | End: 2025-01-10 | Stop reason: HOSPADM

## 2025-01-10 RX ORDER — ACETAMINOPHEN 500 MG
500 TABLET ORAL EVERY 4 HOURS PRN
Qty: 60 TABLET | Refills: 0 | Status: SHIPPED | OUTPATIENT
Start: 2025-01-10

## 2025-01-10 RX ORDER — DIPHENHYDRAMINE HYDROCHLORIDE 50 MG/ML
12.5 INJECTION INTRAMUSCULAR; INTRAVENOUS
Status: DISCONTINUED | OUTPATIENT
Start: 2025-01-10 | End: 2025-01-10 | Stop reason: HOSPADM

## 2025-01-10 RX ORDER — SODIUM CHLORIDE 9 MG/ML
INJECTION, SOLUTION INTRAVENOUS CONTINUOUS
Status: DISCONTINUED | OUTPATIENT
Start: 2025-01-10 | End: 2025-01-10 | Stop reason: HOSPADM

## 2025-01-10 RX ORDER — LIDOCAINE HYDROCHLORIDE 20 MG/ML
INJECTION, SOLUTION INTRAVENOUS
Status: DISCONTINUED | OUTPATIENT
Start: 2025-01-10 | End: 2025-01-10 | Stop reason: SDUPTHER

## 2025-01-10 RX ORDER — PROPOFOL 10 MG/ML
INJECTION, EMULSION INTRAVENOUS
Status: DISCONTINUED | OUTPATIENT
Start: 2025-01-10 | End: 2025-01-10 | Stop reason: SDUPTHER

## 2025-01-10 RX ORDER — NALOXONE HYDROCHLORIDE 0.4 MG/ML
INJECTION, SOLUTION INTRAMUSCULAR; INTRAVENOUS; SUBCUTANEOUS PRN
Status: DISCONTINUED | OUTPATIENT
Start: 2025-01-10 | End: 2025-01-10 | Stop reason: HOSPADM

## 2025-01-10 RX ORDER — ONDANSETRON 2 MG/ML
INJECTION INTRAMUSCULAR; INTRAVENOUS
Status: DISCONTINUED | OUTPATIENT
Start: 2025-01-10 | End: 2025-01-10 | Stop reason: SDUPTHER

## 2025-01-10 RX ORDER — OXYCODONE HYDROCHLORIDE 5 MG/1
5 TABLET ORAL
Status: DISCONTINUED | OUTPATIENT
Start: 2025-01-10 | End: 2025-01-10 | Stop reason: HOSPADM

## 2025-01-10 RX ORDER — ACETAMINOPHEN 500 MG
1000 TABLET ORAL ONCE
Status: COMPLETED | OUTPATIENT
Start: 2025-01-10 | End: 2025-01-10

## 2025-01-10 RX ORDER — MAGNESIUM HYDROXIDE 1200 MG/15ML
LIQUID ORAL CONTINUOUS PRN
Status: DISCONTINUED | OUTPATIENT
Start: 2025-01-10 | End: 2025-01-10 | Stop reason: HOSPADM

## 2025-01-10 RX ADMIN — DEXMEDETOMIDINE 12 MCG: 100 INJECTION, SOLUTION INTRAVENOUS at 09:25

## 2025-01-10 RX ADMIN — ROCURONIUM BROMIDE 50 MG: 10 INJECTION INTRAVENOUS at 09:29

## 2025-01-10 RX ADMIN — ACETAMINOPHEN 1000 MG: 500 TABLET ORAL at 08:09

## 2025-01-10 RX ADMIN — ONDANSETRON 4 MG: 2 INJECTION, SOLUTION INTRAMUSCULAR; INTRAVENOUS at 09:40

## 2025-01-10 RX ADMIN — PROPOFOL 140 MG: 10 INJECTION, EMULSION INTRAVENOUS at 09:29

## 2025-01-10 RX ADMIN — SUGAMMADEX 315 MG: 100 INJECTION, SOLUTION INTRAVENOUS at 10:03

## 2025-01-10 RX ADMIN — LIDOCAINE HYDROCHLORIDE 50 MG: 20 INJECTION, SOLUTION INTRAVENOUS at 09:29

## 2025-01-10 RX ADMIN — FENTANYL CITRATE 25 MCG: 50 INJECTION, SOLUTION INTRAMUSCULAR; INTRAVENOUS at 09:49

## 2025-01-10 RX ADMIN — SODIUM CHLORIDE: 9 INJECTION, SOLUTION INTRAVENOUS at 09:16

## 2025-01-10 RX ADMIN — FENTANYL CITRATE 25 MCG: 50 INJECTION, SOLUTION INTRAMUSCULAR; INTRAVENOUS at 09:29

## 2025-01-10 RX ADMIN — PROPOFOL 10 MG: 10 INJECTION, EMULSION INTRAVENOUS at 09:49

## 2025-01-10 RX ADMIN — FENTANYL CITRATE 25 MCG: 50 INJECTION, SOLUTION INTRAMUSCULAR; INTRAVENOUS at 09:45

## 2025-01-10 ASSESSMENT — PAIN SCALES - GENERAL
PAINLEVEL_OUTOF10: 0
PAINLEVEL_OUTOF10: 0

## 2025-01-10 ASSESSMENT — ENCOUNTER SYMPTOMS: SHORTNESS OF BREATH: 1

## 2025-01-10 NOTE — DISCHARGE INSTRUCTIONS
[facility]  Outpatient Discharge Instructions    To continue your care at home, please follow the instructions below and any additional discharge instructions given to you by your physician.    GENERAL ANESTHESIA:  Do not drive or operate machinery for 24hrs after discharge,  Do not drink alcohol, take tranquilizers, sleeping medication, or any other medication not directly instructed by your physician,   Do not make any important decisions or sign any legal documents for 24hrs after surgery,  Have someone with you for 24hrs after surgery to assist you as needed.    ACTIVITY:  Light activity for 24hrs,  No heavy lifting or exercise until instructed by your physician,  You may resume normal activities once instructed by your physician,  Special Instruction: ___________________________________________________________________    FLUIDS AND DIET:  An upset stomach or feeling sick (nausea) can commonly occur after surgery and/or pain medication use. To help minimize nausea:  Do not eat a heavy meal soon after your surgery,    Start with water or other clear liquids,  Advance to mild or bland items like Jell-O, dry toast, crackers, etc.,  Avoid caffeine,  Do not drink alcohol for at least 24 hours after surgery,  Your physician may prescribe anti-nausea medication if your nausea continues,  If you are free from nausea for 24hrs, you can advance to your normal diet as tolerated.    OPERATIVE SITE:  A small amount of bleeding or drainage after surgery is normal. Your physician will provide you with specific instructions on how to care for your surgical site and/or dressing.   Try not to touch your surgical site unless necessary,   Always wash your hands BEFORE and AFTER changing your dressing if instructed by your physician,   Proper handwashing includes wetting your hands with clean water, applying soap, lathering your hands by rubbing them together with soap for 20 seconds, rinsing them with clean water, and  Adequate: hears normal conversation without difficulty

## 2025-01-10 NOTE — INTERVAL H&P NOTE
Update History & Physical    The patient's History and Physical of January 3, 2025 was reviewed with the patient and I examined the patient. There was no change.     Plan: The risks, benefits, expected outcome, and alternative to the recommended procedure have been discussed with the patient. Patient understands and wants to proceed with the procedure.     Electronically signed by Lachelle Almonte DO on 1/10/2025 at 9:03 AM

## 2025-01-10 NOTE — PROGRESS NOTES
CLINICAL PHARMACY NOTE: MEDS TO BEDS    Total # of Prescriptions Filled: 1   The following medications were delivered to the patient:  Acetaminophen 500 mg Tab    Additional Documentation:

## 2025-01-10 NOTE — ANESTHESIA PRE PROCEDURE
sulfate  (90 Base) MCG/ACT inhaler INHALE 2 PUFFS EVERY 4 TO 6 HOURS AS NEEDED 7/3/19   ProviderLindsey MD   mometasone (ELOCON) 0.1 % cream Apply topically daily.  Patient not taking: Reported on 1/3/2025 9/10/18   Adria Quintanilla MD   calcium carb-cholecalciferol 600-200 MG-UNIT TABS tablet one a day  Patient not taking: Reported on 1/10/2025 1/6/04   ProviderLindsey MD       Current medications:    Current Facility-Administered Medications   Medication Dose Route Frequency Provider Last Rate Last Admin    0.9 % sodium chloride infusion   IntraVENous Continuous Lachelle Almonte, DO        sodium chloride flush 0.9 % injection 5-40 mL  5-40 mL IntraVENous 2 times per day Mark Almontega, DO        sodium chloride flush 0.9 % injection 5-40 mL  5-40 mL IntraVENous PRN Mark Almontega, DO        0.9 % sodium chloride infusion   IntraVENous PRN Lachelle Almonte, DO           Allergies:    Allergies   Allergen Reactions    Shellfish Allergy Anxiety, Hives, Itching, Shortness Of Breath and Swelling    Poison Ivy Extract        Problem List:    Patient Active Problem List   Diagnosis Code    Pilonidal cyst L05.91    Panic attacks F41.0    Obesity E66.9    Type 1 diabetes mellitus ([facility]) E10.9    Background retinopathy due to secondary diabetes ([facility]) E13.3299    Chronic lymphocytic thyroiditis E06.3    Age related cataract H25.9    Acquired hypothyroidism E03.9    Vitamin D deficiency E55.9    Brow ptosis H57.819    Long-term insulin use ([facility]) Z79.4    Nuclear sclerotic cataract of both eyes H25.13    Respiratory failure J96.90    COVID-19 virus infection U07.1    Hypoxia R09.02    Bronchitis J40    Dyspnea R06.00    STEFANO (obstructive sleep apnea) G47.33    Other thrombophilia ([facility]) D68.69    Stage 3 chronic kidney disease, unspecified whether stage 3a or 3b CKD ([facility]) N18.30    Thickened endometrium R93.89       Past Medical History:        Diagnosis Date    Arthritis     right knee and ankle

## 2025-01-10 NOTE — ANESTHESIA POSTPROCEDURE EVALUATION
Department of Anesthesiology  Postprocedure Note    Patient: Abby Bertrand  MRN: [id number]  YOB: 1970  Date of evaluation: 1/10/2025    Procedure Summary       Date: 01/10/25 Room / Location: [address]    Anesthesia Start: 0921 Anesthesia Stop: 1009    Procedure: HYSTEROSCOPY DILATATION AND CURETTAGE. Diagnosis:       Thickened endometrium      (Thickened endometrium [R93.89])    Surgeons: Lachelle Almonte DO Responsible Provider: Abhilash Alvarado DO    Anesthesia Type: general ASA Status: 3            Anesthesia Type: No value filed.    Johanna Phase I:      Johanna Phase II:      Anesthesia Post Evaluation    Patient location during evaluation: bedside  Patient participation: complete - patient participated  Level of consciousness: awake and awake and alert  Pain score: 0  Airway patency: patent  Nausea & Vomiting: no nausea and no vomiting  Cardiovascular status: blood pressure returned to baseline and hemodynamically stable  Respiratory status: acceptable  Hydration status: euvolemic  Pain management: adequate    No notable events documented.

## 2025-01-10 NOTE — OP NOTE
Operative Note  Department of Obstetrics and Gynecology      Patient: Abby Bertrand   : 1970  MRN: [id number]        Date of Procedure: 1/10/25     Pre-operative Diagnosis: 54 y.o. female    Thickened endometrial stripe  Postmenopausal status     Post-operative Diagnosis: Same  Uterine polyp     Procedure: hysteroscopy, dilation and curettage     Surgeon: Dr. Almonte    Anesthesia: General    Findings:  normal appearing vulva, vagina, cervix. Endometrial cavity with polyp, otherwise atrophic appearing     Total IV fluids/Blood products:  300 ml crystalloid    Urine Output:  20 ml      Fluid deficit: 150 ml    Estimated blood loss:  10 ml    Drains:  NA    Specimens:  endometrial polyp, endometrial curettings     Instrument and Sponge Count: Correct x 2    Complications:  None    Condition:  Stable, transferred to post anesthesia recovery      Procedure:  The patient was taken to the operating room with running IV fluids. General anesthesia was administered without difficulty. She was placed in dorsal lithotomy position with Yellofin stirrups, and prepped and draped in the usual sterile fashion.    A speculum was placed into the vagina. The cervix was grasped with a single-tooth tenaculum. The cervix was progressively dilated to allow placement of a 5 mm hysteroscope. The hysteroscope was then introduced into the uterine cavity under direct visualization, and the uterus was distended with normal saline. Survey revealed the above findings. A myosure was used to remove polyp. The hysteroscope was then removed. Gentle sharp curettage was then preformed.    The procedure was deemed compete. The single tooth tenaculum was removed from the cervix. The tenaculum sites were found to be hemostatic on inspection. All instrumentation was removed from the vagina.       The patient tolerated the procedure well. All instrument and sponge counts were correct times two. The patient was awakened from anesthesia  and brought to the recovery room in stable condition.        Lachelle Almonte DO  1/10/2025, 9:57 AM

## 2025-01-22 ENCOUNTER — OFFICE VISIT (OUTPATIENT)
Dept: OBGYN CLINIC | Age: 55
End: 2025-01-22
Payer: COMMERCIAL

## 2025-01-22 VITALS
BODY MASS INDEX: 59.08 KG/M2 | SYSTOLIC BLOOD PRESSURE: 133 MMHG | WEIGHT: 293 LBS | HEART RATE: 99 BPM | DIASTOLIC BLOOD PRESSURE: 80 MMHG

## 2025-01-22 DIAGNOSIS — N85.01 ENDOMETRIAL HYPERPLASIA WITHOUT ATYPIA, SIMPLE: Primary | ICD-10-CM

## 2025-01-22 PROCEDURE — 99213 OFFICE O/P EST LOW 20 MIN: CPT | Performed by: STUDENT IN AN ORGANIZED HEALTH CARE EDUCATION/TRAINING PROGRAM

## 2025-01-22 SDOH — ECONOMIC STABILITY: FOOD INSECURITY: WITHIN THE PAST 12 MONTHS, YOU WORRIED THAT YOUR FOOD WOULD RUN OUT BEFORE YOU GOT MONEY TO BUY MORE.: NEVER TRUE

## 2025-01-22 SDOH — ECONOMIC STABILITY: FOOD INSECURITY: WITHIN THE PAST 12 MONTHS, THE FOOD YOU BOUGHT JUST DIDN'T LAST AND YOU DIDN'T HAVE MONEY TO GET MORE.: NEVER TRUE

## 2025-01-22 ASSESSMENT — PATIENT HEALTH QUESTIONNAIRE - PHQ9
1. LITTLE INTEREST OR PLEASURE IN DOING THINGS: NOT AT ALL
SUM OF ALL RESPONSES TO PHQ QUESTIONS 1-9: 0
5. POOR APPETITE OR OVEREATING: NOT AT ALL
6. FEELING BAD ABOUT YOURSELF - OR THAT YOU ARE A FAILURE OR HAVE LET YOURSELF OR YOUR FAMILY DOWN: NOT AT ALL
10. IF YOU CHECKED OFF ANY PROBLEMS, HOW DIFFICULT HAVE THESE PROBLEMS MADE IT FOR YOU TO DO YOUR WORK, TAKE CARE OF THINGS AT HOME, OR GET ALONG WITH OTHER PEOPLE: NOT DIFFICULT AT ALL
SUM OF ALL RESPONSES TO PHQ QUESTIONS 1-9: 0
SUM OF ALL RESPONSES TO PHQ QUESTIONS 1-9: 0
2. FEELING DOWN, DEPRESSED OR HOPELESS: NOT AT ALL
SUM OF ALL RESPONSES TO PHQ QUESTIONS 1-9: 0
9. THOUGHTS THAT YOU WOULD BE BETTER OFF DEAD, OR OF HURTING YOURSELF: NOT AT ALL
SUM OF ALL RESPONSES TO PHQ9 QUESTIONS 1 & 2: 0
7. TROUBLE CONCENTRATING ON THINGS, SUCH AS READING THE NEWSPAPER OR WATCHING TELEVISION: NOT AT ALL
8. MOVING OR SPEAKING SO SLOWLY THAT OTHER PEOPLE COULD HAVE NOTICED. OR THE OPPOSITE, BEING SO FIGETY OR RESTLESS THAT YOU HAVE BEEN MOVING AROUND A LOT MORE THAN USUAL: NOT AT ALL
4. FEELING TIRED OR HAVING LITTLE ENERGY: NOT AT ALL
3. TROUBLE FALLING OR STAYING ASLEEP: NOT AT ALL

## 2025-01-23 PROBLEM — N85.00 ENDOMETRIAL HYPERPLASIA WITHOUT ATYPIA: Status: ACTIVE | Noted: 2025-01-23

## 2025-01-23 NOTE — PROGRESS NOTES
post-op expectations    All questions asked and answered. Patient is able to correctly repeat  the pertinent facts and she indicates understanding of these issues and agrees with the plan.     Evaluation/Indication for hysterectomy  Indication: endometrial hyperplasia   Imaging: Uterus: 9.1 x 5.8 x 4.1 cm, ovaries not seen on US 11/20/24  PAP: 11/11/24 ASCUS negative HPV  D&C: simple hyperplasia without atypia   Prolapse exam: no significant prolapse   Incontinence: no  Co-Morbidities:  obesity, T1DM (last A1c 6.2%), CKD, STEFANO, htn, hypothyroidism(normal TSH in 2024)          Return for PAT.    No orders of the defined types were placed in this encounter.

## 2025-01-24 ENCOUNTER — TELEPHONE (OUTPATIENT)
Dept: OBGYN CLINIC | Age: 55
End: 2025-01-24

## 2025-01-24 ENCOUNTER — PREP FOR PROCEDURE (OUTPATIENT)
Dept: OBGYN CLINIC | Age: 55
End: 2025-01-24

## 2025-01-24 DIAGNOSIS — N85.00 ENDOMETRIAL HYPERPLASIA: ICD-10-CM

## 2025-01-24 NOTE — TELEPHONE ENCOUNTER
Called pt and lvm telling her the date of surgery and PAT and her Post op appt as well. Told her to call if she had any concerns.

## 2025-03-14 DIAGNOSIS — E10.9 TYPE 1 DIABETES MELLITUS WITHOUT COMPLICATION: ICD-10-CM

## 2025-03-14 DIAGNOSIS — E03.9 ACQUIRED HYPOTHYROIDISM: ICD-10-CM

## 2025-03-14 LAB
ANION GAP SERPL CALCULATED.3IONS-SCNC: 9 MEQ/L (ref 9–15)
BUN SERPL-MCNC: 24 MG/DL (ref 6–20)
CALCIUM SERPL-MCNC: 9.7 MG/DL (ref 8.5–9.9)
CHLORIDE SERPL-SCNC: 112 MEQ/L (ref 95–107)
CO2 SERPL-SCNC: 24 MEQ/L (ref 20–31)
CREAT SERPL-MCNC: 1.37 MG/DL (ref 0.5–0.9)
GLUCOSE SERPL-MCNC: 106 MG/DL (ref 70–99)
POTASSIUM SERPL-SCNC: 4.7 MEQ/L (ref 3.4–4.9)
SODIUM SERPL-SCNC: 145 MEQ/L (ref 135–144)
T4 FREE SERPL-MCNC: 1.3 NG/DL (ref 0.84–1.68)
TSH SERPL-MCNC: 3.55 UIU/ML (ref 0.44–3.86)

## 2025-03-15 LAB
ESTIMATED AVERAGE GLUCOSE: 126 MG/DL
HBA1C MFR BLD: 6 % (ref 4–6)

## 2025-04-15 ENCOUNTER — PREP FOR PROCEDURE (OUTPATIENT)
Dept: OBGYN | Age: 55
End: 2025-04-15

## 2025-04-15 RX ORDER — SODIUM CHLORIDE 9 MG/ML
INJECTION, SOLUTION INTRAVENOUS CONTINUOUS
Status: CANCELLED | OUTPATIENT
Start: 2025-04-25

## 2025-04-15 RX ORDER — ACETAMINOPHEN 500 MG
1000 TABLET ORAL ONCE
Status: CANCELLED | OUTPATIENT
Start: 2025-04-25 | End: 2025-04-15

## 2025-04-15 RX ORDER — SODIUM CHLORIDE 0.9 % (FLUSH) 0.9 %
5-40 SYRINGE (ML) INJECTION PRN
Status: CANCELLED | OUTPATIENT
Start: 2025-04-25

## 2025-04-15 RX ORDER — SODIUM CHLORIDE 0.9 % (FLUSH) 0.9 %
5-40 SYRINGE (ML) INJECTION EVERY 12 HOURS SCHEDULED
Status: CANCELLED | OUTPATIENT
Start: 2025-04-25

## 2025-04-15 RX ORDER — SODIUM CHLORIDE 9 MG/ML
INJECTION, SOLUTION INTRAVENOUS PRN
Status: CANCELLED | OUTPATIENT
Start: 2025-04-25

## 2025-04-18 ENCOUNTER — HOSPITAL ENCOUNTER (OUTPATIENT)
Dept: PREADMISSION TESTING | Age: 55
Discharge: HOME | End: 2025-04-22
Payer: COMMERCIAL

## 2025-04-18 VITALS
BODY MASS INDEX: 48.82 KG/M2 | HEIGHT: 65 IN | WEIGHT: 293 LBS | TEMPERATURE: 97.9 F | SYSTOLIC BLOOD PRESSURE: 147 MMHG | RESPIRATION RATE: 18 BRPM | DIASTOLIC BLOOD PRESSURE: 71 MMHG | OXYGEN SATURATION: 98 % | HEART RATE: 88 BPM

## 2025-04-18 LAB
ABO/RH: NORMAL
ANION GAP SERPL CALCULATED.3IONS-SCNC: 11 MEQ/L (ref 9–15)
ANTIBODY SCREEN: NORMAL
APTT PPP: 24.9 SEC (ref 24.4–36.8)
BUN SERPL-MCNC: 27 MG/DL (ref 6–20)
CALCIUM SERPL-MCNC: 9.3 MG/DL (ref 8.5–9.9)
CHLORIDE SERPL-SCNC: 107 MEQ/L (ref 95–107)
CO2 SERPL-SCNC: 23 MEQ/L (ref 20–31)
CREAT SERPL-MCNC: 1.48 MG/DL (ref 0.5–0.9)
ERYTHROCYTE [DISTWIDTH] IN BLOOD BY AUTOMATED COUNT: 14 % (ref 11.5–14.5)
GLUCOSE SERPL-MCNC: 146 MG/DL (ref 70–99)
HCT VFR BLD AUTO: 42.7 % (ref 37–47)
HGB BLD-MCNC: 13.9 G/DL (ref 12–16)
INR PPP: 1
MCH RBC QN AUTO: 29.8 PG (ref 27–31.3)
MCHC RBC AUTO-ENTMCNC: 32.6 % (ref 33–37)
MCV RBC AUTO: 91.4 FL (ref 79.4–94.8)
PLATELET # BLD AUTO: 262 K/UL (ref 130–400)
POTASSIUM SERPL-SCNC: 4.5 MEQ/L (ref 3.4–4.9)
PROTHROMBIN TIME: 13.2 SEC (ref 12.3–14.9)
RBC # BLD AUTO: 4.67 M/UL (ref 4.2–5.4)
SODIUM SERPL-SCNC: 141 MEQ/L (ref 135–144)
WBC # BLD AUTO: 6.8 K/UL (ref 4.8–10.8)

## 2025-04-18 PROCEDURE — 85610 PROTHROMBIN TIME: CPT

## 2025-04-18 PROCEDURE — 86900 BLOOD TYPING SEROLOGIC ABO: CPT

## 2025-04-18 PROCEDURE — 85730 THROMBOPLASTIN TIME PARTIAL: CPT

## 2025-04-18 PROCEDURE — 86901 BLOOD TYPING SEROLOGIC RH(D): CPT

## 2025-04-18 PROCEDURE — 86850 RBC ANTIBODY SCREEN: CPT

## 2025-04-18 PROCEDURE — 85027 COMPLETE CBC AUTOMATED: CPT

## 2025-04-18 PROCEDURE — 80048 BASIC METABOLIC PNL TOTAL CA: CPT

## 2025-04-18 NOTE — H&P (VIEW-ONLY)
Preoperative Consultation      Name: Abby Bertrand  YOB: 1970  Date of Service: 4/18/2025  PCP: Adria Quintanilla MD    CHIEF COMPLAINT:  endometrial hyperplasia without atypia     HISTORY OF PRESENT ILLNESS:      The patient is a 54 y.o. female with significant past medical history of DM1 (insulin pump), stage 3 CKD, STEFANO (wears CPAP), obesity, hypothyroidism, vit d deficiency, thrombophilia, arthritis, anxiety, panic attacks who presents for a preoperative consultation at the request of surgeon, Dr. Almonte, who plans on performing robotic assisted total laparoscopic hysterectomy bilateral salpingo-oophorectomy on 4/25/25 at [facility].     Pt reports she was having an MRI of her back and had an incidental finding of thickened endometrium. Pt denies vaginal bleeding, vaginal discharge, vaginal pain. Pt denies pelvic pain. Pt denies urologic symptoms. Pt was seen by Dr. Lopez who ordered ultrasound/lab work. Pt reports the ultrasound confirmed thickened endometrium and she was referred to Dr. Almonte for endometrial biopsy which was unsuccessful in office d/t position of her cervix. Pt had hysteroscopic polypectomy and dilation and curettage on 1/10/25 with Dr. Almonte. Pt reports family hx of 2 maternal aunts with uterine cancer and one aunt with ovarian cancer. Pt report she would like to proceed forward with definitive management with hysterectomy bilateral salpingo-oophorectomy. Pt denies pain today.     Known Anesthesia Problems: None  Patient Objection to Receiving Blood Products: No  Personal of FH of DVT/PE: No    Medical/Cardiac Clearance: No    Past Medical History:        Diagnosis Date    Arthritis     right knee and ankle    Chronic kidney disease     Hypothyroidism 1997    Menopausal symptoms     Pilonidal cyst     Thyroid disease     hypothyroid    Type 1 diabetes (HCC)      Past Surgical History:    Past Surgical History:   Procedure Laterality Date    APPENDECTOMY

## 2025-04-18 NOTE — H&P
Preoperative Consultation      Name: Abby Bertrand  YOB: 1970  Date of Service: 4/18/2025  PCP: Adria Quintanilla MD    CHIEF COMPLAINT:  endometrial hyperplasia without atypia     HISTORY OF PRESENT ILLNESS:      The patient is a 54 y.o. female with significant past medical history of DM1 (insulin pump), stage 3 CKD, STEFANO (wears CPAP), obesity, hypothyroidism, vit d deficiency, thrombophilia, arthritis, anxiety, panic attacks who presents for a preoperative consultation at the request of surgeon, Dr. Almonte, who plans on performing robotic assisted total laparoscopic hysterectomy bilateral salpingo-oophorectomy on 4/25/25 at [facility].     Pt reports she was having an MRI of her back and had an incidental finding of thickened endometrium. Pt denies vaginal bleeding, vaginal discharge, vaginal pain. Pt denies pelvic pain. Pt denies urologic symptoms. Pt was seen by Dr. Lopez who ordered ultrasound/lab work. Pt reports the ultrasound confirmed thickened endometrium and she was referred to Dr. Almonte for endometrial biopsy which was unsuccessful in office d/t position of her cervix. Pt had hysteroscopic polypectomy and dilation and curettage on 1/10/25 with Dr. Almonte. Pt reports family hx of 2 maternal aunts with uterine cancer and one aunt with ovarian cancer. Pt report she would like to proceed forward with definitive management with hysterectomy bilateral salpingo-oophorectomy. Pt denies pain today.     Known Anesthesia Problems: None  Patient Objection to Receiving Blood Products: No  Personal of FH of DVT/PE: No    Medical/Cardiac Clearance: No    Past Medical History:        Diagnosis Date    Arthritis     right knee and ankle    Chronic kidney disease     Hypothyroidism 1997    Menopausal symptoms     Pilonidal cyst     Thyroid disease     hypothyroid    Type 1 diabetes (HCC)      Past Surgical History:    Past Surgical History:   Procedure Laterality Date    APPENDECTOMY

## 2025-04-24 ENCOUNTER — ANESTHESIA EVENT (OUTPATIENT)
Dept: OPERATING ROOM | Age: 55
End: 2025-04-24
Payer: COMMERCIAL

## 2025-04-24 ASSESSMENT — ENCOUNTER SYMPTOMS: SHORTNESS OF BREATH: 1

## 2025-04-25 ENCOUNTER — HOSPITAL ENCOUNTER (OUTPATIENT)
Age: 55
Setting detail: OUTPATIENT SURGERY
Discharge: HOME | End: 2025-04-25
Attending: STUDENT IN AN ORGANIZED HEALTH CARE EDUCATION/TRAINING PROGRAM | Admitting: STUDENT IN AN ORGANIZED HEALTH CARE EDUCATION/TRAINING PROGRAM
Payer: COMMERCIAL

## 2025-04-25 ENCOUNTER — ANESTHESIA (OUTPATIENT)
Dept: OPERATING ROOM | Age: 55
End: 2025-04-25
Payer: COMMERCIAL

## 2025-04-25 VITALS
OXYGEN SATURATION: 95 % | HEART RATE: 95 BPM | RESPIRATION RATE: 16 BRPM | WEIGHT: 293 LBS | DIASTOLIC BLOOD PRESSURE: 63 MMHG | TEMPERATURE: 98.4 F | HEIGHT: 65 IN | SYSTOLIC BLOOD PRESSURE: 135 MMHG | BODY MASS INDEX: 48.82 KG/M2

## 2025-04-25 DIAGNOSIS — N85.00 ENDOMETRIAL HYPERPLASIA: ICD-10-CM

## 2025-04-25 DIAGNOSIS — N85.00 ENDOMETRIAL HYPERPLASIA WITHOUT ATYPIA: Primary | ICD-10-CM

## 2025-04-25 LAB
GLUCOSE BLD-MCNC: 146 MG/DL (ref 70–99)
GLUCOSE BLD-MCNC: 328 MG/DL (ref 70–99)
GLUCOSE BLD-MCNC: 331 MG/DL (ref 70–99)
GLUCOSE BLD-MCNC: 333 MG/DL (ref 70–99)
GLUCOSE BLD-MCNC: 354 MG/DL (ref 70–99)
GLUCOSE BLD-MCNC: 356 MG/DL (ref 70–99)
GLUCOSE BLD-MCNC: 419 MG/DL (ref 70–99)
PERFORMED ON: ABNORMAL

## 2025-04-25 PROCEDURE — 3700000001 HC ADD 15 MINUTES (ANESTHESIA): Performed by: STUDENT IN AN ORGANIZED HEALTH CARE EDUCATION/TRAINING PROGRAM

## 2025-04-25 PROCEDURE — 6360000002 HC RX W HCPCS

## 2025-04-25 PROCEDURE — 3600000019 HC SURGERY ROBOT ADDTL 15MIN: Performed by: STUDENT IN AN ORGANIZED HEALTH CARE EDUCATION/TRAINING PROGRAM

## 2025-04-25 PROCEDURE — 2500000003 HC RX 250 WO HCPCS

## 2025-04-25 PROCEDURE — 6360000002 HC RX W HCPCS: Performed by: STUDENT IN AN ORGANIZED HEALTH CARE EDUCATION/TRAINING PROGRAM

## 2025-04-25 PROCEDURE — 88307 TISSUE EXAM BY PATHOLOGIST: CPT

## 2025-04-25 PROCEDURE — 6370000000 HC RX 637 (ALT 250 FOR IP): Performed by: ANESTHESIOLOGY

## 2025-04-25 PROCEDURE — 58571 TLH W/T/O 250 G OR LESS: CPT | Performed by: STUDENT IN AN ORGANIZED HEALTH CARE EDUCATION/TRAINING PROGRAM

## 2025-04-25 PROCEDURE — 3600000009 HC SURGERY ROBOT BASE: Performed by: STUDENT IN AN ORGANIZED HEALTH CARE EDUCATION/TRAINING PROGRAM

## 2025-04-25 PROCEDURE — 2500000003 HC RX 250 WO HCPCS: Performed by: STUDENT IN AN ORGANIZED HEALTH CARE EDUCATION/TRAINING PROGRAM

## 2025-04-25 PROCEDURE — 88305 TISSUE EXAM BY PATHOLOGIST: CPT

## 2025-04-25 PROCEDURE — 6370000000 HC RX 637 (ALT 250 FOR IP)

## 2025-04-25 PROCEDURE — 7100000001 HC PACU RECOVERY - ADDTL 15 MIN: Performed by: STUDENT IN AN ORGANIZED HEALTH CARE EDUCATION/TRAINING PROGRAM

## 2025-04-25 PROCEDURE — 64488 TAP BLOCK BI INJECTION: CPT | Performed by: STUDENT IN AN ORGANIZED HEALTH CARE EDUCATION/TRAINING PROGRAM

## 2025-04-25 PROCEDURE — 6370000000 HC RX 637 (ALT 250 FOR IP): Performed by: STUDENT IN AN ORGANIZED HEALTH CARE EDUCATION/TRAINING PROGRAM

## 2025-04-25 PROCEDURE — 7100000010 HC PHASE II RECOVERY - FIRST 15 MIN: Performed by: STUDENT IN AN ORGANIZED HEALTH CARE EDUCATION/TRAINING PROGRAM

## 2025-04-25 PROCEDURE — 3700000000 HC ANESTHESIA ATTENDED CARE: Performed by: STUDENT IN AN ORGANIZED HEALTH CARE EDUCATION/TRAINING PROGRAM

## 2025-04-25 PROCEDURE — 7100000000 HC PACU RECOVERY - FIRST 15 MIN: Performed by: STUDENT IN AN ORGANIZED HEALTH CARE EDUCATION/TRAINING PROGRAM

## 2025-04-25 PROCEDURE — 2720000010 HC SURG SUPPLY STERILE: Performed by: STUDENT IN AN ORGANIZED HEALTH CARE EDUCATION/TRAINING PROGRAM

## 2025-04-25 PROCEDURE — 7100000011 HC PHASE II RECOVERY - ADDTL 15 MIN: Performed by: STUDENT IN AN ORGANIZED HEALTH CARE EDUCATION/TRAINING PROGRAM

## 2025-04-25 PROCEDURE — 2580000003 HC RX 258: Performed by: STUDENT IN AN ORGANIZED HEALTH CARE EDUCATION/TRAINING PROGRAM

## 2025-04-25 PROCEDURE — S2900 ROBOTIC SURGICAL SYSTEM: HCPCS | Performed by: STUDENT IN AN ORGANIZED HEALTH CARE EDUCATION/TRAINING PROGRAM

## 2025-04-25 PROCEDURE — 2709999900 HC NON-CHARGEABLE SUPPLY: Performed by: STUDENT IN AN ORGANIZED HEALTH CARE EDUCATION/TRAINING PROGRAM

## 2025-04-25 RX ORDER — ALBUTEROL SULFATE 90 UG/1
INHALANT RESPIRATORY (INHALATION)
Status: DISCONTINUED | OUTPATIENT
Start: 2025-04-25 | End: 2025-04-25 | Stop reason: SDUPTHER

## 2025-04-25 RX ORDER — INSULIN LISPRO 100 [IU]/ML
10 INJECTION, SOLUTION INTRAVENOUS; SUBCUTANEOUS ONCE
Status: COMPLETED | OUTPATIENT
Start: 2025-04-25 | End: 2025-04-25

## 2025-04-25 RX ORDER — SODIUM CHLORIDE 0.9 % (FLUSH) 0.9 %
5-40 SYRINGE (ML) INJECTION EVERY 12 HOURS SCHEDULED
Status: DISCONTINUED | OUTPATIENT
Start: 2025-04-25 | End: 2025-04-25 | Stop reason: HOSPADM

## 2025-04-25 RX ORDER — OXYCODONE HYDROCHLORIDE 5 MG/1
5 TABLET ORAL
Status: DISCONTINUED | OUTPATIENT
Start: 2025-04-25 | End: 2025-04-25 | Stop reason: HOSPADM

## 2025-04-25 RX ORDER — SODIUM CHLORIDE 9 MG/ML
INJECTION, SOLUTION INTRAVENOUS PRN
Status: DISCONTINUED | OUTPATIENT
Start: 2025-04-25 | End: 2025-04-25 | Stop reason: HOSPADM

## 2025-04-25 RX ORDER — SUCCINYLCHOLINE/SOD CL,ISO/PF 100 MG/5ML
SYRINGE (ML) INTRAVENOUS
Status: DISCONTINUED | OUTPATIENT
Start: 2025-04-25 | End: 2025-04-25 | Stop reason: SDUPTHER

## 2025-04-25 RX ORDER — NALOXONE HYDROCHLORIDE 0.4 MG/ML
INJECTION, SOLUTION INTRAMUSCULAR; INTRAVENOUS; SUBCUTANEOUS PRN
Status: DISCONTINUED | OUTPATIENT
Start: 2025-04-25 | End: 2025-04-25 | Stop reason: HOSPADM

## 2025-04-25 RX ORDER — DEXAMETHASONE SODIUM PHOSPHATE 4 MG/ML
INJECTION, SOLUTION INTRA-ARTICULAR; INTRALESIONAL; INTRAMUSCULAR; INTRAVENOUS; SOFT TISSUE
Status: DISCONTINUED | OUTPATIENT
Start: 2025-04-25 | End: 2025-04-25 | Stop reason: SDUPTHER

## 2025-04-25 RX ORDER — ONDANSETRON 2 MG/ML
4 INJECTION INTRAMUSCULAR; INTRAVENOUS
Status: DISCONTINUED | OUTPATIENT
Start: 2025-04-25 | End: 2025-04-25 | Stop reason: HOSPADM

## 2025-04-25 RX ORDER — SENNA AND DOCUSATE SODIUM 50; 8.6 MG/1; MG/1
1 TABLET, FILM COATED ORAL DAILY
Qty: 60 TABLET | Refills: 0 | Status: SHIPPED | OUTPATIENT
Start: 2025-04-25

## 2025-04-25 RX ORDER — MAGNESIUM HYDROXIDE 1200 MG/15ML
LIQUID ORAL CONTINUOUS PRN
Status: DISCONTINUED | OUTPATIENT
Start: 2025-04-25 | End: 2025-04-25 | Stop reason: HOSPADM

## 2025-04-25 RX ORDER — METOCLOPRAMIDE HYDROCHLORIDE 5 MG/ML
10 INJECTION INTRAMUSCULAR; INTRAVENOUS
Status: DISCONTINUED | OUTPATIENT
Start: 2025-04-25 | End: 2025-04-25 | Stop reason: HOSPADM

## 2025-04-25 RX ORDER — MIDAZOLAM HYDROCHLORIDE 1 MG/ML
INJECTION, SOLUTION INTRAMUSCULAR; INTRAVENOUS
Status: DISCONTINUED | OUTPATIENT
Start: 2025-04-25 | End: 2025-04-25 | Stop reason: SDUPTHER

## 2025-04-25 RX ORDER — DIPHENHYDRAMINE HYDROCHLORIDE 50 MG/ML
12.5 INJECTION, SOLUTION INTRAMUSCULAR; INTRAVENOUS
Status: DISCONTINUED | OUTPATIENT
Start: 2025-04-25 | End: 2025-04-25 | Stop reason: HOSPADM

## 2025-04-25 RX ORDER — ROCURONIUM BROMIDE 10 MG/ML
INJECTION, SOLUTION INTRAVENOUS
Status: DISCONTINUED | OUTPATIENT
Start: 2025-04-25 | End: 2025-04-25 | Stop reason: SDUPTHER

## 2025-04-25 RX ORDER — ROPIVACAINE HYDROCHLORIDE 5 MG/ML
INJECTION, SOLUTION EPIDURAL; INFILTRATION; PERINEURAL
Status: COMPLETED | OUTPATIENT
Start: 2025-04-25 | End: 2025-04-25

## 2025-04-25 RX ORDER — LIDOCAINE HYDROCHLORIDE 10 MG/ML
1 INJECTION, SOLUTION EPIDURAL; INFILTRATION; INTRACAUDAL; PERINEURAL
Status: DISCONTINUED | OUTPATIENT
Start: 2025-04-25 | End: 2025-04-25 | Stop reason: HOSPADM

## 2025-04-25 RX ORDER — LIDOCAINE HYDROCHLORIDE 10 MG/ML
INJECTION, SOLUTION EPIDURAL; INFILTRATION; INTRACAUDAL; PERINEURAL
Status: DISCONTINUED | OUTPATIENT
Start: 2025-04-25 | End: 2025-04-25 | Stop reason: SDUPTHER

## 2025-04-25 RX ORDER — ACETAMINOPHEN 500 MG
1000 TABLET ORAL ONCE
Status: COMPLETED | OUTPATIENT
Start: 2025-04-25 | End: 2025-04-25

## 2025-04-25 RX ORDER — SODIUM CHLORIDE 0.9 % (FLUSH) 0.9 %
5-40 SYRINGE (ML) INJECTION PRN
Status: DISCONTINUED | OUTPATIENT
Start: 2025-04-25 | End: 2025-04-25 | Stop reason: HOSPADM

## 2025-04-25 RX ORDER — SODIUM CHLORIDE 9 MG/ML
INJECTION, SOLUTION INTRAVENOUS CONTINUOUS
Status: DISCONTINUED | OUTPATIENT
Start: 2025-04-25 | End: 2025-04-25 | Stop reason: HOSPADM

## 2025-04-25 RX ORDER — OXYCODONE HYDROCHLORIDE 5 MG/1
5 TABLET ORAL EVERY 6 HOURS PRN
Qty: 20 TABLET | Refills: 0 | Status: SHIPPED | OUTPATIENT
Start: 2025-04-25 | End: 2025-04-30

## 2025-04-25 RX ORDER — FENTANYL CITRATE 50 UG/ML
INJECTION, SOLUTION INTRAMUSCULAR; INTRAVENOUS
Status: DISCONTINUED | OUTPATIENT
Start: 2025-04-25 | End: 2025-04-25 | Stop reason: SDUPTHER

## 2025-04-25 RX ORDER — PROPOFOL 10 MG/ML
INJECTION, EMULSION INTRAVENOUS
Status: DISCONTINUED | OUTPATIENT
Start: 2025-04-25 | End: 2025-04-25 | Stop reason: SDUPTHER

## 2025-04-25 RX ORDER — ONDANSETRON 2 MG/ML
INJECTION INTRAMUSCULAR; INTRAVENOUS
Status: DISCONTINUED | OUTPATIENT
Start: 2025-04-25 | End: 2025-04-25 | Stop reason: SDUPTHER

## 2025-04-25 RX ORDER — FENTANYL CITRATE 0.05 MG/ML
50 INJECTION, SOLUTION INTRAMUSCULAR; INTRAVENOUS EVERY 10 MIN PRN
Status: DISCONTINUED | OUTPATIENT
Start: 2025-04-25 | End: 2025-04-25 | Stop reason: HOSPADM

## 2025-04-25 RX ADMIN — ROCURONIUM BROMIDE 20 MG: 10 INJECTION, SOLUTION INTRAVENOUS at 09:45

## 2025-04-25 RX ADMIN — INSULIN LISPRO 10 UNITS: 100 INJECTION, SOLUTION INTRAVENOUS; SUBCUTANEOUS at 13:47

## 2025-04-25 RX ADMIN — PROPOFOL 250 MG: 10 INJECTION, EMULSION INTRAVENOUS at 07:40

## 2025-04-25 RX ADMIN — ROCURONIUM BROMIDE 30 MG: 10 INJECTION, SOLUTION INTRAVENOUS at 10:14

## 2025-04-25 RX ADMIN — ROCURONIUM BROMIDE 20 MG: 10 INJECTION, SOLUTION INTRAVENOUS at 09:15

## 2025-04-25 RX ADMIN — CEFAZOLIN 3000 MG: 3 INJECTION, POWDER, FOR SOLUTION INTRAVENOUS at 07:46

## 2025-04-25 RX ADMIN — INSULIN LISPRO 10 UNITS: 100 INJECTION, SOLUTION INTRAVENOUS; SUBCUTANEOUS at 12:53

## 2025-04-25 RX ADMIN — DEXAMETHASONE SODIUM PHOSPHATE 4 MG: 4 INJECTION INTRA-ARTICULAR; INTRALESIONAL; INTRAMUSCULAR; INTRAVENOUS; SOFT TISSUE at 07:43

## 2025-04-25 RX ADMIN — SODIUM CHLORIDE: 0.9 INJECTION, SOLUTION INTRAVENOUS at 06:49

## 2025-04-25 RX ADMIN — LIDOCAINE HYDROCHLORIDE 50 MG: 10 INJECTION, SOLUTION EPIDURAL; INFILTRATION; INTRACAUDAL; PERINEURAL at 07:40

## 2025-04-25 RX ADMIN — FENTANYL CITRATE 50 MCG: 50 INJECTION, SOLUTION INTRAMUSCULAR; INTRAVENOUS at 08:28

## 2025-04-25 RX ADMIN — FENTANYL CITRATE 50 MCG: 50 INJECTION, SOLUTION INTRAMUSCULAR; INTRAVENOUS at 08:58

## 2025-04-25 RX ADMIN — ROPIVACAINE HYDROCHLORIDE 60 ML: 5 INJECTION, SOLUTION EPIDURAL; INFILTRATION; PERINEURAL at 07:44

## 2025-04-25 RX ADMIN — MIDAZOLAM HYDROCHLORIDE 2 MG: 1 INJECTION, SOLUTION INTRAMUSCULAR; INTRAVENOUS at 07:31

## 2025-04-25 RX ADMIN — ALBUTEROL SULFATE 4 PUFF: 90 AEROSOL, METERED RESPIRATORY (INHALATION) at 11:00

## 2025-04-25 RX ADMIN — ACETAMINOPHEN 1000 MG: 500 TABLET ORAL at 06:42

## 2025-04-25 RX ADMIN — ROCURONIUM BROMIDE 50 MG: 10 INJECTION, SOLUTION INTRAVENOUS at 07:43

## 2025-04-25 RX ADMIN — SUGAMMADEX 400 MG: 100 INJECTION, SOLUTION INTRAVENOUS at 11:22

## 2025-04-25 RX ADMIN — FENTANYL CITRATE 50 MCG: 50 INJECTION, SOLUTION INTRAMUSCULAR; INTRAVENOUS at 07:40

## 2025-04-25 RX ADMIN — ROCURONIUM BROMIDE 30 MG: 10 INJECTION, SOLUTION INTRAVENOUS at 08:44

## 2025-04-25 RX ADMIN — SODIUM CHLORIDE: 0.9 INJECTION, SOLUTION INTRAVENOUS at 10:33

## 2025-04-25 RX ADMIN — Medication 100 MG: at 07:40

## 2025-04-25 RX ADMIN — FENTANYL CITRATE 50 MCG: 0.05 INJECTION, SOLUTION INTRAMUSCULAR; INTRAVENOUS at 12:49

## 2025-04-25 RX ADMIN — ONDANSETRON 4 MG: 2 INJECTION, SOLUTION INTRAMUSCULAR; INTRAVENOUS at 11:00

## 2025-04-25 RX ADMIN — INSULIN HUMAN 20 UNITS: 100 INJECTION, SOLUTION PARENTERAL at 15:30

## 2025-04-25 ASSESSMENT — PAIN DESCRIPTION - ORIENTATION
ORIENTATION: MID;RIGHT
ORIENTATION: INNER;LOWER

## 2025-04-25 ASSESSMENT — PAIN SCALES - GENERAL
PAINLEVEL_OUTOF10: 4
PAINLEVEL_OUTOF10: 4
PAINLEVEL_OUTOF10: 3
PAINLEVEL_OUTOF10: 4
PAINLEVEL_OUTOF10: 3
PAINLEVEL_OUTOF10: 4
PAINLEVEL_OUTOF10: 5
PAINLEVEL_OUTOF10: 4
PAINLEVEL_OUTOF10: 3
PAINLEVEL_OUTOF10: 4
PAINLEVEL_OUTOF10: 4
PAINLEVEL_OUTOF10: 5
PAINLEVEL_OUTOF10: 3
PAINLEVEL_OUTOF10: 4

## 2025-04-25 ASSESSMENT — PAIN DESCRIPTION - FREQUENCY: FREQUENCY: CONTINUOUS

## 2025-04-25 ASSESSMENT — PAIN - FUNCTIONAL ASSESSMENT
PAIN_FUNCTIONAL_ASSESSMENT: ACTIVITIES ARE NOT PREVENTED
PAIN_FUNCTIONAL_ASSESSMENT: PREVENTS OR INTERFERES SOME ACTIVE ACTIVITIES AND ADLS

## 2025-04-25 ASSESSMENT — PAIN DESCRIPTION - DESCRIPTORS
DESCRIPTORS: CRAMPING
DESCRIPTORS: ACHING
DESCRIPTORS: CRAMPING

## 2025-04-25 ASSESSMENT — PAIN SCALES - WONG BAKER
WONGBAKER_NUMERICALRESPONSE: NO HURT

## 2025-04-25 ASSESSMENT — PAIN DESCRIPTION - LOCATION
LOCATION: ABDOMEN
LOCATION: BACK
LOCATION: BACK

## 2025-04-25 NOTE — PROGRESS NOTES
Received report from Denise Pagan.  Bs rechecked 1448 . Dr. Chinchilla notified. Gave 20 units Regular.  Patients family member brought patients connector up to hospital. Patient reconnected Insulin pump back up and gave self 10 units,  Recheck Bs 1601...Bs 354. Dr. Gracia notified.   Elsiht to send patient home below 300...Recheck BS 1643.    Recheck Bs 1716 . DR. Gracia notified.  Dr. Gracia came and spoke with patient and spouse.Angela to send patient home per Dr. Gracia. Patient stable and discharged home

## 2025-04-25 NOTE — DISCHARGE INSTRUCTIONS
DISCHARGE INSTRUCTIONS    1. When you get home you may eat and drink anything.  2. You may do stairs the day of surgery.  3. Showering only.  No bathing or swimming until all incisions are completely healed.   4. You may drive once you are off all narcotics.   5. No heavy lifting (> 25lbs)  for 6 weeks.  6. No strenuous exercise for 6 weeks.   7. No douching or intercourse for 6 weeks and cleared by physician.    Please keep your post operative appointments as scheduled.     It is normal to have spotting, pink/red or brown vaginal discharge for the first 3-4 months following surgery.  Please call if anything resembles a period.     Please call if you are unable to urinate, have profuse vaginal bleeding, have a fever > 101 degrees F, severe abdominal pain that is not relived by pain medication, nausea or vomiting that is not improving, and back pain that is unresponsive to Motrin, heating pad or massage.      If you have any other concerns or do not feel well please call or go to the Emergency Department for further evaluation.        [facility]  Outpatient Discharge Instructions    To continue your care at home, please follow the instructions below and any additional discharge instructions given to you by your physician.    GENERAL ANESTHESIA:  Do not drive or operate machinery for 24hrs after discharge,  Do not drink alcohol, take tranquilizers, sleeping medication, or any other medication not directly instructed by your physician,   Do not make any important decisions or sign any legal documents for 24hrs after surgery,  Have someone with you for 24hrs after surgery to assist you as needed.    ACTIVITY:  Light activity for 24hrs,  No heavy lifting or exercise until instructed by your physician,  You may resume normal activities once instructed by your physician,  Special Instruction: ___________________________________________________________________    FLUIDS AND DIET:  An upset stomach or feeling sick

## 2025-04-25 NOTE — PROGRESS NOTES
CLINICAL PHARMACY NOTE: MEDS TO BEDS    Total # of Prescriptions Filled: 2   The following medications were delivered to the patient:  Oxycodone 5 mg Tab  Stool Softener 8.6-50 mg Tab    Additional Documentation:

## 2025-04-25 NOTE — ANESTHESIA PROCEDURE NOTES
Peripheral Block    Patient location during procedure: OR  Reason for block: post-op pain management and at surgeon's request  Start time: 4/25/2025 7:44 AM  End time: 4/25/2025 7:54 AM  Staffing  Performed: anesthesiologist   Anesthesiologist: Abhilash Alvarado DO  Performed by: Abhilash Alvarado DO  Authorized by: Abhilash Alvarado DO    Preanesthetic Checklist  Completed: patient identified, IV checked, site marked, risks and benefits discussed, surgical/procedural consents, equipment checked, pre-op evaluation, timeout performed, anesthesia consent given, oxygen available and monitors applied/VS acknowledged  Peripheral Block   Patient position: supine  Prep: ChloraPrep  Provider prep: mask and sterile gloves  Patient monitoring: cardiac monitor, continuous pulse ox, frequent blood pressure checks and IV access  Block type: TAP  Laterality: bilateral  Injection technique: single-shot  Guidance: ultrasound guided  Local infiltration: ropivacaine  Infiltration strength: 0.5 %  Local infiltration: ropivacaine  Dose: 30 mL    Needle   Needle type: combined needle/nerve stimulator   Needle gauge: 22 G  Needle localization: anatomical landmarks and ultrasound guidance  Needle length: 10 cm  Assessment   Injection assessment: negative aspiration for heme, no paresthesia on injection and local visualized surrounding nerve on ultrasound  Paresthesia pain: immediately resolved  Slow fractionated injection: yes  Hemodynamics: stable  Outcomes: uncomplicated and patient tolerated procedure well    Additional Notes  Ultrasound guidance used to view needle for placement    Ultrasound image stored, printed and saved in patient chart.    Sterile probe cover used    Ropivacaine 0.5% 60 ml + saline 40 ml    50 ml bilateral    Medications Administered  ropivacaine (NAROPIN) injection 0.5% - Perineural   60 mL - 4/25/2025 7:44:00 AM

## 2025-04-25 NOTE — OP NOTE
Operative Note  Department of Obstetrics and Gynecology      Patient: Abby Bertrand   : 1970  MRN: [id number]        Date of Procedure: 25     Pre-operative Diagnosis: 54 y.o. female    Endometrial hyperplasia      Post-operative Diagnosis: Same    Procedure: Robotic assisted total laparoscopic hysterectomy, bilateral salpingectomy, cystoscopy    Surgeon: Lachelle lAmonte DO    Anesthesia: general, TAP block     Findings:  normal appearing uterus, bilateral ovaries, bilateral fallopian tubes. Omental adhesions to anterior to right abdominal wall.     Total IV fluids/Blood products:  1100 ml crystalloid    Urine Output:  500 ml      Estimated blood loss:  75 ml    Drains:  billings catheter removed at the end of the case     Specimens:  uterus, cervix, bilateral fallopian tubes, bilateral ovaries     Instrument and Sponge Count: Correct x 2 Yes    Complications:  none    Condition:  good, transferred to post anesthesia recovery      Procedure:  After informed, written consent was obtained, the patient was identified in the preoperative holding area and taken to the operating room where anesthesia was found be adequate.  She was then prepped and draped in the usual sterile fashion in the dorsal lithotomy position with Cole stirrups.  Care was taken to neither hyperextend nor hyperflex the patient's hips or knees and her arms were tucked at the sides in a neutral position.  A surgical timeout was performed.  A swan neck and a right angle were placed in patient's vagina and the anterior lip of the cervix was grasped with a single-tooth tenaculum. 2 stitches were placed over the cervix with 0 vicryl. A medium KOH and 6cm ASHLEY uterine manipulator was placed without difficulty and all other instruments were removed from the patient's vagina.  The Billings catheter was placed using sterile technique.     Attention was then turned to the patient's abdomen.  Entry into the abdominal cavity was performed via

## 2025-04-25 NOTE — PROGRESS NOTES
Patient's accucheck syzo=[id number]. Dr. Alvarado called and notified. OK to resume insulin pump when patient awake and able to manage.

## 2025-04-25 NOTE — PROGRESS NOTES
Patient awake and states that the access for her insulin pump was removed. Dr. Alvarado notified and orders for one time dose of insulin SQ ordered.

## 2025-04-25 NOTE — ANESTHESIA PRE PROCEDURE
Department of Anesthesiology  Preprocedure Note       Name:  Abby Bertrand   Age:  54 y.o.  :  1970                                          MRN:  [id number]         Date:  2025      Surgeon: Surgeon(s):  Lachelle Almonte DO    Procedure: Procedure(s):  ROBOTIC ASSISTED TOTAL LAPAROSCOPIC HYSTERECTOMY,BILATERAL SALPINGO-OOPHORECTOMY. #LBS    Medications prior to admission:   Prior to Admission medications    Medication Sig Start Date End Date Taking? Authorizing Provider   HUMALOG 100 UNIT/ML SOLN injection vial INJECT PER INSULIN PUMP    MAXIMUM DOSE 100 UNITS PER DAY SUBCUTANEOUSLY 24  Yes Colt Matta MD   pantoprazole (PROTONIX) 40 MG tablet TAKE 1 TABLET DAILY 24  Yes Adria Quintanilla MD   diclofenac (VOLTAREN) 75 MG EC tablet  23  Yes Lindsey Calvin MD   levothyroxine (SYNTHROID) 125 MCG tablet TAKE 2 TABLETS DAILY 23  Yes Colt Matta MD   traMADol (ULTRAM) 50 MG tablet Take 1 tablet by mouth 2 times daily as needed. 23  Yes Lindsey Calvin MD   topiramate (TOPAMAX) 50 MG tablet 4 tablets 2 times daily 23  Yes Lindsey Calvin MD   INPEN 100-BLUE-ROBLES-HUMALOG VON  23  Yes Lindsey Calvin MD   Insulin Syringe-Needle U-100 ([employer] INSULIN SYRINGE) 31G X \" 0.5 ML MISC 1 each by Does not apply route daily 10/25/22  Yes Colt Matta MD   albuterol sulfate  (90 Base) MCG/ACT inhaler INHALE 2 PUFFS EVERY 4 TO 6 HOURS AS NEEDED 7/3/19  Yes Lindsey Calvin MD   acetaminophen (TYLENOL) 500 MG tablet Take 1 tablet by mouth every 4 hours as needed for Pain  Patient not taking: Reported on 2025 1/10/25   Lachelle Almonte DO   gabapentin (NEURONTIN) 100 MG capsule Take 1 capsule by mouth daily.  Patient not taking: Reported on 2025    Lindsey Calvin MD   traZODone (DESYREL) 50 MG tablet TAKE 2 TABLETS AT BEDTIME  Patient taking differently: TAKE 1 TABLETS AT BEDTIME 24   Adria Quintanilla MD   QUEtiapine

## 2025-04-25 NOTE — INTERVAL H&P NOTE
Update History & Physical    The patient's History and Physical of April 18, 2025 was reviewed with the patient and I examined the patient. There was no change. The surgical procedure was confirmed by the patient and me.     Plan: The risks, benefits, expected outcome, and alternative to the recommended procedure have been discussed with the patient. Patient understands and wants to proceed with the procedure.     Electronically signed by Lachelle Almonte DO on 4/25/2025 at 7:33 AM

## 2025-04-25 NOTE — ANESTHESIA POSTPROCEDURE EVALUATION
Department of Anesthesiology  Postprocedure Note    Patient: Abby Bertrand  MRN: [id number]  YOB: 1970  Date of evaluation: 4/25/2025    Procedure Summary       Date: 04/25/25 Room / Location: [address]    Anesthesia Start: 0733 Anesthesia Stop: 1138    Procedure: ROBOTIC ASSISTED TOTAL LAPAROSCOPIC HYSTERECTOMY,BILATERAL SALPINGO-OOPHORECTOMY. #LBS (Abdomen/Perineum) Diagnosis:       Endometrial hyperplasia      (Endometrial hyperplasia [N85.00])    Surgeons: Lachelle Almonte DO Responsible Provider: Abhilash Alvarado DO    Anesthesia Type: general, regional ASA Status: 3            Anesthesia Type: No value filed.    Johanna Phase I: Johanna Score: 10    Johanna Phase II:      Anesthesia Post Evaluation    Patient location during evaluation: bedside  Patient participation: complete - patient participated  Level of consciousness: awake and awake and alert  Pain score: 0  Airway patency: patent  Nausea & Vomiting: no nausea and no vomiting  Cardiovascular status: blood pressure returned to baseline and hemodynamically stable  Respiratory status: acceptable and face mask  Hydration status: euvolemic  Comments: Patient\"S blood sugar uncontrolled;received 2 doses of regular insulin ( 20 unit each). Last reading . Patient had an insulin pump restarted. She insisted to leave the hospital. Advised her to closely monitor her BS at home and if stayed uncontrolled ; she should go to the nearest ER.  Pain management: adequate      No notable events documented.

## 2025-05-05 RX ORDER — INSULIN LISPRO 100 [IU]/ML
INJECTION, SOLUTION INTRAVENOUS; SUBCUTANEOUS
Qty: 90 ML | Refills: 1 | OUTPATIENT
Start: 2025-05-05

## 2025-05-12 ENCOUNTER — OFFICE VISIT (OUTPATIENT)
Dept: OBGYN CLINIC | Age: 55
End: 2025-05-12

## 2025-05-12 VITALS
DIASTOLIC BLOOD PRESSURE: 70 MMHG | HEART RATE: 84 BPM | SYSTOLIC BLOOD PRESSURE: 140 MMHG | BODY MASS INDEX: 59.03 KG/M2 | WEIGHT: 293 LBS

## 2025-05-12 DIAGNOSIS — Z90.710 S/P HYSTERECTOMY WITH OOPHORECTOMY: ICD-10-CM

## 2025-05-12 DIAGNOSIS — Z90.721 S/P HYSTERECTOMY WITH OOPHORECTOMY: ICD-10-CM

## 2025-05-12 DIAGNOSIS — F41.0 PANIC ATTACKS: ICD-10-CM

## 2025-05-12 DIAGNOSIS — Z09 POSTOPERATIVE EXAMINATION: Primary | ICD-10-CM

## 2025-05-12 PROCEDURE — 99024 POSTOP FOLLOW-UP VISIT: CPT | Performed by: STUDENT IN AN ORGANIZED HEALTH CARE EDUCATION/TRAINING PROGRAM

## 2025-05-12 RX ORDER — BACITRACIN ZINC AND POLYMYXIN B SULFATE 500; 1000 [USP'U]/G; [USP'U]/G
OINTMENT TOPICAL
Qty: 15 G | Refills: 0 | Status: SHIPPED | OUTPATIENT
Start: 2025-05-12 | End: 2025-05-19

## 2025-05-12 ASSESSMENT — ENCOUNTER SYMPTOMS
VOMITING: 0
ABDOMINAL PAIN: 0
ABDOMINAL DISTENTION: 0
DIARRHEA: 0
CONSTIPATION: 0
NAUSEA: 0

## 2025-05-12 NOTE — PROGRESS NOTES
HPI:   Abby Bertrand is a 54 y.o. female  here today for postoperative follow-up.    Patient underwent RTLH,BSO on 25. Surgery was uncomplicated.     Surgical pathology reviewed---  FINAL DIAGNOSIS:   A.  HYSTERECTOMY:   MILD CHRONIC CERVICITIS AND NABOTHIAN CYSTS   SIMPLE ENDOMETRIAL HYPERPLASIA WITH MINUTE FOCUS OF ATYPIA   ADENOMYOSIS   UTERINE LEIOMYOMAS     B.  LEFT SALPINGO-OOPHORECTOMY:   CORPUS LUTEUM AND ENDOSALPINGOSIS, OVARY   FALLOPIAN TUBE, NO PATHOLOGIC DIAGNOSIS     C.  RIGHT SALPINGO-OOPHORECTOMY:   ENDOSALPINGOSIS, OVARY   FALLOPIAN TUBE, NO PATHOLOGIC DIAGNOSIS     Patient is doing well post operatively. Reports pain is well-controlled.  She felt recovery was easier than she had expected.  She is eating and drinking without nausea or vomiting.  She denies urinary or bowel complaints.  She denies fevers or chills.  Denies drainage from any incision sites.  Denies vaginal bleeding.  Reports she feels like she had an anxiety attack on Saturday.  It initiated with a hot flash followed by some chest pain which is typical for her anxiety attack.  She took some of her Xanax on Saturday as well as last night and feels like it subsided this morning.  She is not sure if it was related to hormonal changes from surgery versus one of her baseline panic attacks.  She has not had one in some time she reports.  She follows up with her PCP next week.  She has not had any further anxiety this morning.  She denies current chest pain, shortness of breath.  She reports no other hot flashes aside from Saturday.     REVIEW OF SYSTEMS:  Review of Systems   Constitutional:  Negative for chills and fever.   Gastrointestinal:  Negative for abdominal distention, abdominal pain, constipation, diarrhea, nausea and vomiting.   Genitourinary:  Negative for difficulty urinating, dysuria, menstrual problem, pelvic pain, vaginal bleeding and vaginal discharge.   Psychiatric/Behavioral:  The patient is nervous/anxious

## 2025-05-22 ENCOUNTER — OFFICE VISIT (OUTPATIENT)
Age: 55
End: 2025-05-22
Payer: COMMERCIAL

## 2025-05-22 VITALS
HEIGHT: 65 IN | SYSTOLIC BLOOD PRESSURE: 154 MMHG | DIASTOLIC BLOOD PRESSURE: 82 MMHG | OXYGEN SATURATION: 97 % | TEMPERATURE: 97.7 F | BODY MASS INDEX: 48.82 KG/M2 | HEART RATE: 113 BPM | WEIGHT: 293 LBS

## 2025-05-22 VITALS
WEIGHT: 293 LBS | HEART RATE: 78 BPM | DIASTOLIC BLOOD PRESSURE: 75 MMHG | HEIGHT: 65 IN | BODY MASS INDEX: 48.82 KG/M2 | SYSTOLIC BLOOD PRESSURE: 122 MMHG

## 2025-05-22 DIAGNOSIS — N18.30 STAGE 3 CHRONIC KIDNEY DISEASE, UNSPECIFIED WHETHER STAGE 3A OR 3B CKD (HCC): ICD-10-CM

## 2025-05-22 DIAGNOSIS — E10.22 TYPE 1 DIABETES MELLITUS WITH STAGE 3B CHRONIC KIDNEY DISEASE (HCC): Primary | ICD-10-CM

## 2025-05-22 DIAGNOSIS — N85.00 ENDOMETRIAL HYPERPLASIA WITHOUT ATYPIA: ICD-10-CM

## 2025-05-22 DIAGNOSIS — Z12.31 ENCOUNTER FOR SCREENING MAMMOGRAM FOR MALIGNANT NEOPLASM OF BREAST: ICD-10-CM

## 2025-05-22 DIAGNOSIS — Z96.41 INSULIN PUMP IN PLACE: ICD-10-CM

## 2025-05-22 DIAGNOSIS — E10.9 TYPE 1 DIABETES MELLITUS WITHOUT COMPLICATION (HCC): Primary | ICD-10-CM

## 2025-05-22 DIAGNOSIS — E03.9 ACQUIRED HYPOTHYROIDISM: ICD-10-CM

## 2025-05-22 DIAGNOSIS — F41.0 PANIC ATTACKS: ICD-10-CM

## 2025-05-22 DIAGNOSIS — N18.32 TYPE 1 DIABETES MELLITUS WITH STAGE 3B CHRONIC KIDNEY DISEASE (HCC): Primary | ICD-10-CM

## 2025-05-22 LAB
AMPHET UR QL SCN: NORMAL
BARBITURATES UR QL SCN: NORMAL
BENZODIAZ UR QL SCN: NORMAL
CANNABINOIDS UR QL SCN: NORMAL
COCAINE UR QL SCN: NORMAL
CREAT UR-MCNC: 65.3 MG/DL
DRUG SCREEN COMMENT UR-IMP: NORMAL
FENTANYL SCREEN, URINE: NORMAL
METHADONE UR QL SCN: NORMAL
MICROALBUMIN UR-MCNC: <1.2 MG/DL
MICROALBUMIN/CREAT UR-RTO: NORMAL MG/G (ref 0–30)
OPIATES UR QL SCN: NORMAL
OXYCODONE UR QL SCN: NORMAL
PCP UR QL SCN: NORMAL
PROPOXYPH UR QL SCN: NORMAL

## 2025-05-22 PROCEDURE — 3044F HG A1C LEVEL LT 7.0%: CPT | Performed by: INTERNAL MEDICINE

## 2025-05-22 PROCEDURE — 99214 OFFICE O/P EST MOD 30 MIN: CPT | Performed by: FAMILY MEDICINE

## 2025-05-22 PROCEDURE — 99214 OFFICE O/P EST MOD 30 MIN: CPT | Performed by: INTERNAL MEDICINE

## 2025-05-22 PROCEDURE — 3044F HG A1C LEVEL LT 7.0%: CPT | Performed by: FAMILY MEDICINE

## 2025-05-22 RX ORDER — ALBUTEROL SULFATE 90 UG/1
2 INHALANT RESPIRATORY (INHALATION) EVERY 4 HOURS PRN
Qty: 18 G | Refills: 0 | Status: SHIPPED | OUTPATIENT
Start: 2025-05-22

## 2025-05-22 RX ORDER — TOPIRAMATE 200 MG/1
200 TABLET, FILM COATED ORAL DAILY
COMMUNITY
Start: 2025-05-19

## 2025-05-22 RX ORDER — LISINOPRIL 20 MG/1
20 TABLET ORAL DAILY
Qty: 90 TABLET | Refills: 3 | Status: SHIPPED | OUTPATIENT
Start: 2025-05-22

## 2025-05-22 RX ORDER — ALPRAZOLAM 0.5 MG
TABLET ORAL
Qty: 90 TABLET | Refills: 2 | Status: SHIPPED | OUTPATIENT
Start: 2025-05-22 | End: 2025-08-22

## 2025-05-22 NOTE — PROGRESS NOTES
daily, Disp: 300 each, Rfl: 3  Lab Results   Component Value Date     04/18/2025    K 4.5 04/18/2025     04/18/2025    CO2 23 04/18/2025    BUN 27 (H) 04/18/2025    CREATININE 1.48 (H) 04/18/2025    GLUCOSE 146 (H) 04/18/2025    CALCIUM 9.3 04/18/2025    BILITOT 0.4 04/17/2021    ALKPHOS 117 04/17/2021    AST 37 (H) 04/17/2021    ALT 41 (H) 04/17/2021    LABGLOM 41.7 (L) 04/18/2025    GFRAA 45.2 (L) 06/17/2022    GLOB 3.5 04/17/2021     Lab Results   Component Value Date    WBC 6.8 04/18/2025    HGB 13.9 04/18/2025    HCT 42.7 04/18/2025    MCV 91.4 04/18/2025     04/18/2025     Lab Results   Component Value Date    LABA1C 6.0 03/14/2025    LABA1C 6.2 (H) 01/03/2025    LABA1C 6.1 (H) 05/10/2024     Lab Results   Component Value Date    CHOLFAST 166 06/17/2022    TRIGLYCFAST 128 06/17/2022    HDL 55 12/08/2023    HDL 53 06/17/2022    HDL 63 (H) 01/08/2021    CHOL 179 12/08/2023    CHOL 173 01/08/2021    CHOL 164 09/25/2018    TRIG 101 12/08/2023    TRIG 89 01/08/2021    TRIG 67 09/25/2018     No results found for: \"TESTM\"  Lab Results   Component Value Date    TSH 3.550 03/14/2025    TSH 0.838 05/10/2024    TSH 0.608 12/08/2023    T4FREE 1.30 03/14/2025    T4FREE 1.53 05/10/2024    T4FREE 1.62 12/08/2023     No results found for: \"TPOABS\"    Review of Systems   Constitutional:  Positive for fatigue and weight gain.   Cardiovascular: Negative.    Endocrine: Negative for cold intolerance and heat intolerance.   All other systems reviewed and are negative.      Objective:   Physical Exam  Vitals reviewed.   Constitutional:       General: She is not in acute distress.     Appearance: Normal appearance. She is obese.   HENT:      Head: Normocephalic and atraumatic.      Right Ear: External ear normal.      Left Ear: External ear normal.      Nose: Nose normal.   Eyes:      General: No scleral icterus.        Right eye: No discharge.         Left eye: No discharge.      Extraocular Movements: Extraocular

## 2025-05-22 NOTE — PROGRESS NOTES
[facility]  [facility]  [address] [facility] [address]  Dept: [phone]  Dept Fax: [phone]  Loc: [phone]     Subjective  Abby Bertrand, 54 y.o. female Established patient presents today with:  Chief Complaint   Patient presents with    Medication Check    Blood Pressure Check     Patient would like to discuss BP medication. Patient states BP has been elevated. Patient states BP has been consistently in 150-170s.        History of Present Illness        Past Medical History:   Diagnosis Date    Arthritis     right knee and ankle    Chronic kidney disease     Hypothyroidism     Menopausal symptoms     Pilonidal cyst     Thyroid disease     hypothyroid    Type 1 diabetes (HCC)      Past Surgical History:   Procedure Laterality Date    APPENDECTOMY      ARTHROSCOPY / ARTHROTOMY KNEE Right 2016    RIGHT KNEE ARTHROSCOPY / MEDIAL MENISCUS TEAR / SUPINE  performed by Stephane Wilson MD at [facility] OR     SECTION  97-02    DILATION AND CURETTAGE OF UTERUS N/A 1/10/2025    HYSTEROSCOPY DILATATION AND CURETTAGE. performed by Lachelle Almonte DO at [facility] OR    HYSTERECTOMY (CERVIX STATUS UNKNOWN) N/A 2025    ROBOTIC ASSISTED TOTAL LAPAROSCOPIC HYSTERECTOMY,BILATERAL SALPINGO-OOPHORECTOMY. #LBS performed by Lachelle Almonte DO at [facility] OR    PILONIDAL CYST EXCISION      WISDOM TOOTH EXTRACTION       Immunization History   Administered Date(s) Administered    COVID-19, PFIZER PURPLE top, DILUTE for use, (age 12 y+), 30mcg/0.3mL 2021, 06/10/2021    Hepatitis B 2010, 2011    Influenza, FLUCELVAX, (age 6 mo+), MDCK, Quadv PF, 0.5mL 2023    MMR, PRIORIX, M-M-R II, (age 12m+), SC, 0.5mL 2001    Pneumococcal, PCV20, PREVNAR 20, (age 6w+), IM, 0.5mL 2022    Pneumococcal, PPSV23, PNEUMOVAX 23, (age 2y+), SC/IM, 0.5mL 2021     Current Outpatient

## 2025-06-10 ENCOUNTER — TELEPHONE (OUTPATIENT)
Age: 55
End: 2025-06-10

## 2025-06-10 RX ORDER — INSULIN LISPRO 100 [IU]/ML
INJECTION, SOLUTION INTRAVENOUS; SUBCUTANEOUS
Qty: 90 ML | Refills: 1 | Status: SHIPPED | OUTPATIENT
Start: 2025-06-10

## 2025-06-19 DIAGNOSIS — N18.32 TYPE 1 DIABETES MELLITUS WITH STAGE 3B CHRONIC KIDNEY DISEASE (HCC): ICD-10-CM

## 2025-06-19 DIAGNOSIS — E10.22 TYPE 1 DIABETES MELLITUS WITH STAGE 3B CHRONIC KIDNEY DISEASE (HCC): ICD-10-CM

## 2025-06-19 LAB
CHOLEST SERPL-MCNC: 179 MG/DL (ref 0–199)
HDLC SERPL-MCNC: 56 MG/DL (ref 40–59)
LDL CHOLESTEROL: 103 MG/DL (ref 0–129)
TRIGLYCERIDE, FASTING: 101 MG/DL (ref 0–150)

## 2025-06-20 ENCOUNTER — RESULTS FOLLOW-UP (OUTPATIENT)
Age: 55
End: 2025-06-20

## 2025-06-25 ENCOUNTER — OFFICE VISIT (OUTPATIENT)
Dept: OBGYN CLINIC | Age: 55
End: 2025-06-25

## 2025-06-25 VITALS
BODY MASS INDEX: 59.03 KG/M2 | DIASTOLIC BLOOD PRESSURE: 80 MMHG | WEIGHT: 293 LBS | SYSTOLIC BLOOD PRESSURE: 126 MMHG | HEART RATE: 88 BPM

## 2025-06-25 DIAGNOSIS — Z09 FOLLOW-UP EXAM: Primary | ICD-10-CM

## 2025-06-25 PROCEDURE — 99024 POSTOP FOLLOW-UP VISIT: CPT | Performed by: STUDENT IN AN ORGANIZED HEALTH CARE EDUCATION/TRAINING PROGRAM

## 2025-06-25 ASSESSMENT — ENCOUNTER SYMPTOMS
VOMITING: 0
DIARRHEA: 0
CONSTIPATION: 0
NAUSEA: 0

## 2025-06-25 NOTE — PROGRESS NOTES
HPI:   Abby Bertrand is a 54 y.o. female  here today for postoperative follow-up.    Patient underwent RTLH,BSO on 25. Surgery was uncomplicated.      Surgical pathology reviewed---  FINAL DIAGNOSIS:   A.  HYSTERECTOMY:   MILD CHRONIC CERVICITIS AND NABOTHIAN CYSTS   SIMPLE ENDOMETRIAL HYPERPLASIA WITH MINUTE FOCUS OF ATYPIA   ADENOMYOSIS   UTERINE LEIOMYOMAS     B.  LEFT SALPINGO-OOPHORECTOMY:   CORPUS LUTEUM AND ENDOSALPINGOSIS, OVARY   FALLOPIAN TUBE, NO PATHOLOGIC DIAGNOSIS     C.  RIGHT SALPINGO-OOPHORECTOMY:   ENDOSALPINGOSIS, OVARY   FALLOPIAN TUBE, NO PATHOLOGIC DIAGNOSIS     Pt is doing well post operatively. Feels completely back to normal. Describes no pain. Urinating and bowel movements without difficulty. No nausea or vomiting. No vaginal bleeding, discharge or odors.       REVIEW OF SYSTEMS:  Review of Systems   Constitutional:  Negative for chills and fever.   Gastrointestinal:  Negative for constipation, diarrhea, nausea and vomiting.   Genitourinary:  Negative for difficulty urinating, pelvic pain, vaginal bleeding and vaginal discharge.          PHYSICAL EXAMINATION:    /80   Pulse 88   Wt (!) 158.8 kg (350 lb)   LMP 2020   BMI 59.03 kg/m²      Physical Exam  Constitutional:       General: She is not in acute distress.     Appearance: Normal appearance. She is not ill-appearing, toxic-appearing or diaphoretic.   Cardiovascular:      Rate and Rhythm: Normal rate.   Pulmonary:      Effort: Pulmonary effort is normal. No respiratory distress.   Abdominal:      General: Abdomen is flat. There is no distension.      Palpations: Abdomen is soft.      Tenderness: There is no abdominal tenderness. There is no guarding or rebound.          Comments: Lap incisions well healed   Genitourinary:     General: Normal vulva.      Vagina: Normal. No tenderness, bleeding or lesions.      Comments: Vaginal cuff well healed and intact.   Skin:     General: Skin is warm and dry.

## 2025-06-30 ENCOUNTER — OFFICE VISIT (OUTPATIENT)
Age: 55
End: 2025-06-30
Payer: COMMERCIAL

## 2025-06-30 VITALS
BODY MASS INDEX: 50.02 KG/M2 | SYSTOLIC BLOOD PRESSURE: 120 MMHG | TEMPERATURE: 97.5 F | HEIGHT: 64 IN | HEART RATE: 81 BPM | DIASTOLIC BLOOD PRESSURE: 70 MMHG | WEIGHT: 293 LBS | OXYGEN SATURATION: 98 %

## 2025-06-30 DIAGNOSIS — I10 PRIMARY HYPERTENSION: ICD-10-CM

## 2025-06-30 DIAGNOSIS — Z12.31 ENCOUNTER FOR SCREENING MAMMOGRAM FOR MALIGNANT NEOPLASM OF BREAST: Primary | ICD-10-CM

## 2025-06-30 DIAGNOSIS — E10.22 TYPE 1 DIABETES MELLITUS WITH STAGE 3B CHRONIC KIDNEY DISEASE (HCC): ICD-10-CM

## 2025-06-30 DIAGNOSIS — N18.32 TYPE 1 DIABETES MELLITUS WITH STAGE 3B CHRONIC KIDNEY DISEASE (HCC): ICD-10-CM

## 2025-06-30 PROCEDURE — 3078F DIAST BP <80 MM HG: CPT | Performed by: FAMILY MEDICINE

## 2025-06-30 PROCEDURE — 3044F HG A1C LEVEL LT 7.0%: CPT | Performed by: FAMILY MEDICINE

## 2025-06-30 PROCEDURE — 3074F SYST BP LT 130 MM HG: CPT | Performed by: FAMILY MEDICINE

## 2025-06-30 PROCEDURE — 99214 OFFICE O/P EST MOD 30 MIN: CPT | Performed by: FAMILY MEDICINE

## 2025-06-30 RX ORDER — INSULIN LISPRO 100 [IU]/ML
INJECTION, SOLUTION INTRAVENOUS; SUBCUTANEOUS
Qty: 90 ML | Refills: 1 | Status: SHIPPED | OUTPATIENT
Start: 2025-06-30

## 2025-06-30 RX ORDER — LEVOTHYROXINE SODIUM 125 UG/1
TABLET ORAL
Qty: 180 TABLET | Refills: 3 | Status: SHIPPED | OUTPATIENT
Start: 2025-06-30

## 2025-06-30 RX ORDER — ESOMEPRAZOLE MAGNESIUM 40 MG/1
40 CAPSULE, DELAYED RELEASE ORAL
Qty: 90 CAPSULE | Refills: 3 | Status: SHIPPED | OUTPATIENT
Start: 2025-06-30

## 2025-06-30 NOTE — PROGRESS NOTES
[facility]  [facility]  [address] [address]  Dept: [phone]  Dept Fax: [phone]  Loc: [phone]     Subjective  Abby Bertrand, 54 y.o. female Established patient presents today with:  Chief Complaint   Patient presents with    Hypertension       History of Present Illness  The patient presents for evaluation of hypertension, diabetes mellitus, GERD, and pilonidal cyst.    Her blood pressure readings have been consistently in the range of 140/70. She reports no adverse effects from her current medication regimen. She was previously on lisinopril 10 mg, which was recently increased to 20 mg.    She is due for a mammogram and plans to schedule it soon. She had been waiting to complete her hysterectomy recovery before scheduling the mammogram. She underwent a hysterectomy on 06/25/2025, performed by Dr. Black, and reports satisfactory healing post-surgery. An internal examination confirmed complete healing, and she was advised to return only if any issues arise.    Her blood glucose levels are well-controlled, with her most recent A1c reading at 6. She does not require a refill of her current medication at this time.    She is currently taking pantoprazole for stomach issues. However, her new insurance does not cover this medication, and she is seeking an alternative. She notes that several of her other medications have gastrointestinal side effects.    She has a pilonidal cyst that occasionally causes discomfort. The pain is intermittent, sometimes absent for 1 to 2 weeks, but can be severe enough to prevent sitting or standing. She reports no drainage from the cyst. The area appears swollen at times, but she can not palpate or visualize a cyst or bump.    PAST SURGICAL HISTORY:  Hysterectomy on 06/25/2025      Past Medical History:   Diagnosis Date    Arthritis     right knee and ankle

## 2025-07-01 RX ORDER — QUETIAPINE FUMARATE 100 MG/1
100 TABLET, FILM COATED ORAL 3 TIMES DAILY
Qty: 270 TABLET | Refills: 3 | Status: SHIPPED | OUTPATIENT
Start: 2025-07-01

## 2025-07-02 RX ORDER — PANTOPRAZOLE SODIUM 40 MG/1
40 TABLET, DELAYED RELEASE ORAL DAILY
Qty: 90 TABLET | Refills: 3 | Status: SHIPPED | OUTPATIENT
Start: 2025-07-02

## 2025-07-02 RX ORDER — TRAZODONE HYDROCHLORIDE 50 MG/1
TABLET ORAL
Qty: 180 TABLET | Refills: 0 | Status: SHIPPED | OUTPATIENT
Start: 2025-07-02

## (undated) DEVICE — SUTURE SPIRAL 2-0 SH STRATAFIX

## (undated) DEVICE — SUTURE VICRYL SZ 0 L36IN ABSRB UD L36MM CT-1 1/2 CIR J946H

## (undated) DEVICE — OCCLUDER UTER DISP COLPO-PNEUMO

## (undated) DEVICE — TOWEL,OR,DSP,ST,BLUE,STD,4/PK,20PK/CS: Brand: MEDLINE

## (undated) DEVICE — SPONGE,LAP,18"X18",DLX,XR,ST,5/PK,40/PK: Brand: MEDLINE

## (undated) DEVICE — SUTURE MONOCRYL SZ 4-0 L27IN ABSRB UD L19MM PS-2 1/2 CIR PRIM Y426H

## (undated) DEVICE — TROCAR: Brand: KII FIOS FIRST ENTRY

## (undated) DEVICE — SUTURE POLYESTER STRATAFIX V-34 SXPP1B457

## (undated) DEVICE — SHEET,DRAPE,UNDERBUTTOCK,GRAD POUCH,PORT: Brand: MEDLINE

## (undated) DEVICE — GYN LAPAROSCOPY: Brand: MEDLINE INDUSTRIES, INC.

## (undated) DEVICE — GLOVE ORANGE PI 7   MSG9070

## (undated) DEVICE — Device

## (undated) DEVICE — SYRINGE MED 10ML TRNSLUC BRL PLUNG BLK MRK POLYPR CTRL

## (undated) DEVICE — NEEDLE INSUF L120MM ULT VERES ENDOPATH

## (undated) DEVICE — ELECTRO LUBE IS A SINGLE PATIENT USE DEVICE THAT IS INTENDED TO BE USED ON ELECTROSURGICAL ELECTRODES TO REDUCE STICKING.: Brand: KEY SURGICAL ELECTRO LUBE

## (undated) DEVICE — GLOVE SURG SZ 65 L12IN FNGR THK79MIL GRN LTX FREE

## (undated) DEVICE — SYRINGE MED 30ML STD CLR PLAS LUERLOCK TIP N CTRL DISP

## (undated) DEVICE — KIT,ANTI FOG,W/SPONGE & FLUID,SOFT PACK: Brand: MEDLINE

## (undated) DEVICE — SURGICEL ENDOSCP APPL

## (undated) DEVICE — ARM DRAPE

## (undated) DEVICE — SET FLD CTRL SYS INFLO AND OUTFLO TB AQUILEX

## (undated) DEVICE — BINDER ABD UNISX 12IN 85IN 3XL UNIV

## (undated) DEVICE — DEVICE TISS REM DIA3MM L25.25IN ENDOSCP F/ IU POLYPS

## (undated) DEVICE — SOLUTION ANTIFOG VIS SYS CLEARIFY LAPSCP

## (undated) DEVICE — SYRINGE MED 50ML LUERLOCK TIP

## (undated) DEVICE — GOWN,AURORA,NONREINFORCED,LARGE: Brand: MEDLINE

## (undated) DEVICE — DISSECTOR LAP DIA5MM BLNT TIP ENDOPATH

## (undated) DEVICE — SUTURE VICRYL SZ 2-0 L27IN ABSRB VLT L26MM SH 1/2 CIR J317H

## (undated) DEVICE — SUTURE VICRYL SZ 4-0 L27IN ABSRB UD L26MM SH 1/2 CIR J415H

## (undated) DEVICE — KIT CANSTR VAC TANTEM TB FOR AQUILEX FLD CTRL SYS

## (undated) DEVICE — THE STERILE LIGHT HANDLE COVER IS USED WITH STERIS SURGICAL LIGHTING AND VISUALIZATION SYSTEMS.

## (undated) DEVICE — LITHOTOMY: Brand: MEDLINE INDUSTRIES, INC.

## (undated) DEVICE — JELLY,LUBE,STERILE,FLIP TOP,TUBE,2-OZ: Brand: MEDLINE

## (undated) DEVICE — COLUMN DRAPE

## (undated) DEVICE — SEAL

## (undated) DEVICE — VESSEL SEALER EXTEND: Brand: ENDOWRIST

## (undated) DEVICE — SHEET BD STD REPOS LO FICT BTM SFT TOP NO STRP 9 HNDL PER

## (undated) DEVICE — TIP IU L6CM DIA6.7MM WHT SIL FLX DISP RUMI II

## (undated) DEVICE — TIP COVER ACCESSORY

## (undated) DEVICE — AGENT HEMSTAT 3GM OXIDIZED REGENERATED CELOS ABSRB FOR CONT (ORDER MULTIPLES OF 5EA)

## (undated) DEVICE — SET ENDOSCP SEAL HYSTEROSCOPE RIG OUTFLO CHN DISP MYOSURE

## (undated) DEVICE — SHEET,DRAPE,53X77,STERILE: Brand: MEDLINE